# Patient Record
Sex: FEMALE | Race: WHITE | NOT HISPANIC OR LATINO | Employment: UNEMPLOYED | URBAN - METROPOLITAN AREA
[De-identification: names, ages, dates, MRNs, and addresses within clinical notes are randomized per-mention and may not be internally consistent; named-entity substitution may affect disease eponyms.]

---

## 2014-07-09 LAB — EXTERNAL HIV SCREEN: NORMAL

## 2017-01-09 ENCOUNTER — APPOINTMENT (OUTPATIENT)
Dept: LAB | Facility: CLINIC | Age: 32
End: 2017-01-09
Payer: COMMERCIAL

## 2017-01-09 ENCOUNTER — TRANSCRIBE ORDERS (OUTPATIENT)
Dept: URGENT CARE | Facility: CLINIC | Age: 32
End: 2017-01-09

## 2017-01-09 ENCOUNTER — OFFICE VISIT (OUTPATIENT)
Dept: URGENT CARE | Facility: CLINIC | Age: 32
End: 2017-01-09
Payer: COMMERCIAL

## 2017-01-09 DIAGNOSIS — R39.9 UNSPECIFIED SYMPTOMS AND SIGNS INVOLVING THE GENITOURINARY SYSTEM: ICD-10-CM

## 2017-01-09 PROCEDURE — 87086 URINE CULTURE/COLONY COUNT: CPT

## 2017-01-09 PROCEDURE — 99283 EMERGENCY DEPT VISIT LOW MDM: CPT

## 2017-01-09 PROCEDURE — G0382 LEV 3 HOSP TYPE B ED VISIT: HCPCS

## 2017-01-10 LAB — BACTERIA UR CULT: NORMAL

## 2017-02-22 ENCOUNTER — OFFICE VISIT (OUTPATIENT)
Dept: URGENT CARE | Facility: CLINIC | Age: 32
End: 2017-02-22
Payer: COMMERCIAL

## 2017-02-22 PROCEDURE — 99284 EMERGENCY DEPT VISIT MOD MDM: CPT

## 2017-02-22 PROCEDURE — 81002 URINALYSIS NONAUTO W/O SCOPE: CPT

## 2017-02-22 PROCEDURE — 81025 URINE PREGNANCY TEST: CPT

## 2017-02-22 PROCEDURE — G0383 LEV 4 HOSP TYPE B ED VISIT: HCPCS

## 2017-04-03 ENCOUNTER — OFFICE VISIT (OUTPATIENT)
Dept: URGENT CARE | Facility: CLINIC | Age: 32
End: 2017-04-03
Payer: COMMERCIAL

## 2017-04-03 PROCEDURE — 99283 EMERGENCY DEPT VISIT LOW MDM: CPT

## 2017-04-03 PROCEDURE — G0382 LEV 3 HOSP TYPE B ED VISIT: HCPCS

## 2017-04-04 ENCOUNTER — OFFICE VISIT (OUTPATIENT)
Dept: URGENT CARE | Facility: CLINIC | Age: 32
End: 2017-04-04
Payer: COMMERCIAL

## 2017-04-04 PROCEDURE — 99284 EMERGENCY DEPT VISIT MOD MDM: CPT

## 2017-04-04 PROCEDURE — G0383 LEV 4 HOSP TYPE B ED VISIT: HCPCS

## 2017-08-01 ENCOUNTER — OFFICE VISIT (OUTPATIENT)
Dept: URGENT CARE | Facility: CLINIC | Age: 32
End: 2017-08-01
Payer: COMMERCIAL

## 2017-08-01 ENCOUNTER — APPOINTMENT (OUTPATIENT)
Dept: LAB | Facility: CLINIC | Age: 32
End: 2017-08-01
Payer: COMMERCIAL

## 2017-08-01 ENCOUNTER — TRANSCRIBE ORDERS (OUTPATIENT)
Dept: URGENT CARE | Facility: CLINIC | Age: 32
End: 2017-08-01

## 2017-08-01 DIAGNOSIS — R35.0 FREQUENCY OF MICTURITION: ICD-10-CM

## 2017-08-01 PROCEDURE — 87086 URINE CULTURE/COLONY COUNT: CPT

## 2017-08-01 PROCEDURE — 99283 EMERGENCY DEPT VISIT LOW MDM: CPT

## 2017-08-01 PROCEDURE — G0382 LEV 3 HOSP TYPE B ED VISIT: HCPCS

## 2017-08-02 LAB — BACTERIA UR CULT: NORMAL

## 2017-12-21 ENCOUNTER — OFFICE VISIT (OUTPATIENT)
Dept: URGENT CARE | Facility: CLINIC | Age: 32
End: 2017-12-21
Payer: COMMERCIAL

## 2017-12-21 PROCEDURE — G0382 LEV 3 HOSP TYPE B ED VISIT: HCPCS

## 2017-12-21 PROCEDURE — 99283 EMERGENCY DEPT VISIT LOW MDM: CPT

## 2017-12-23 NOTE — PROGRESS NOTES
Assessment   1  Insect bite, initial encounter (500 4,L562 4) [facility])    Discussion/Summary   Discussion Summary:    Discussed diagnosis of insect bites right upper arm may use over-the-counter Benadryl as needed for itching and follow up with PCP in 3-5 days  Medication Side Effects Reviewed: Possible side effects of new medications were reviewed with the patient/guardian today  Understands and agrees with treatment plan: The treatment plan was reviewed with the patient/guardian  The patient/guardian understands and agrees with the treatment plan    Counseling Documentation With Imm: The patient was counseled regarding instructions for management,-- patient and family education,-- importance of compliance with treatment  total time of encounter was 25 minutes-- and-- 10 minutes was spent counseling  Follow Up Instructions: Follow Up with your Primary Care Provider in 3-5 days  If your symptoms worsen, go to the nearest [place] [address] Emergency Department  Chief Complaint   1  Rash  Chief Complaint Free Text Note Form: C/o rash on upper right arm today  History of Present Illness   HPI: 58-year-old female presents to urgent care with chief complaint of red raised rash to right upper arm she noticed it this morning there is 1 red raised area noted right arm    Hospital Based Practices Required Assessment:      Pain Assessment      the patient states they do not have pain  Abuse And Domestic Violence Screen       Yes, the patient is safe at home  -- The patient states no one is hurting them  Depression And Suicide Screen  No, the patient has not had thoughts of hurting themself  No, the patient has not felt depressed in the past 7 days  Readiness To Learn: Receptive  Barriers To Learning: none        Education Completed: disease/condition,-- medications-- and-- further treatment/follow-up      Teaching Method: verbal      Person Taught: patient      Evaluation Of Learning: verbalized/demonstrated understanding    Rash: Meryle Larsen presents with complaints of rash  Associated symptoms include skin bumps-- and-- pruritus, but-- no skin blistering,-- no cracking,-- no crusting,-- no draining,-- no skin dryness,-- no skin oiliness,-- no pain,-- no skin redness,-- no skin scaling,-- no skin swelling,-- no skin ulceration,-- no skin weeping,-- no excoriations,-- no fever,-- no fissuring,-- no nausea,-- no pustules,-- no purulent drainage-- and-- no serous discharge  Review of Systems   Focused-Female:      Constitutional: No fever, no chills, feels well, no tiredness, no recent weight gain or loss  ENT: no ear ache, no loss of hearing, no nosebleeds or nasal discharge, no sore throat or hoarseness  Cardiovascular: no complaints of slow or fast heart rate, no chest pain, no palpitations, no leg claudication or lower extremity edema  Respiratory: no complaints of shortness of breath, no wheezing, no dyspnea on exertion, no orthopnea or PND  Breasts: no complaints of breast pain, breast lump or nipple discharge  Gastrointestinal: no complaints of abdominal pain, no constipation, no nausea or diarrhea, no vomiting, no bloody stools  Genitourinary: no complaints of dysuria, no incontinence, no pelvic pain, no dysmenorrhea, no vaginal discharge or abnormal vaginal bleeding  Musculoskeletal: no complaints of arthralgia, no myalgia, no joint swelling or stiffness, no limb pain or swelling  Integumentary: as noted in HPI  Neurological: no complaints of headache, no confusion, no numbness or tingling, no dizziness or fainting  ROS Reviewed:    ROS reviewed  Active Problems   1  Abdominal pain, acute (789 00,338 19) (R10 9)   2  Urinary frequency (788 41) (R35 0)    Past Medical History   1  History of Acute frontal sinusitis (461 1) (J01 10)   2  History of Acute URI (465 9) (J06 9)   3   History of Bug bites (919 4,E906 4) (W57 XXXA)   4  History of Cellulitis (682 9) (L03 90)   5  History of Dysuria (788 1) (R30 0)   6  History of abdominal pain (V13 89) (Z87 898)   7  History of acute bronchitis (V12 69) (Z87 09)   8  History of acute sinusitis (V12 69) (Z87 09)   9  History of diarrhea (V12 79) (Z87 898)   10  History of pregnancy (V13 29)   11  History of spontaneous  (V13 29) (Z87 59)   12  History of urinary tract infection (V13 02) (Z87 440)   13  History of urinary tract infection (V13 02) (Z87 440)   14  History of urinary tract infection (V13 02) (Z87 440)   15  History of urinary tract infection (V13 02) (Z87 440)   16  Nausea with vomiting (787 01) (R11 2)   17  History of Symptoms involving urinary system (788 99) (R39 9)  Active Problems And Past Medical History Reviewed: The active problems and past medical history were reviewed and updated today  Family History   Father    1  Family history of hypertension (V17 49) (Z82 49)  Grandmother    2  Family history of lung cancer (V16 1) (Z80 1)  Grandfather    3  Family history of cardiac disorder (V17 49) (Z82 49)  Family History    4  Family history of lung cancer (V16 1) (Z80 1)  Family History Reviewed: The family history was reviewed and updated today  Social History    · Current every day smoker (305 1) (F17 200)  Social History Reviewed: The social history was reviewed and updated today  The social history was reviewed and is unchanged  Surgical History   1  History of  Section   2  History of Dilation And Curettage   3  History of Tubal Ligation  Surgical History Reviewed: The surgical history was reviewed and updated today  Current Meds    1  Multi-Day Oral Tablet; Therapy: (Recorded:2016) to Recorded   2  Xanax TABS; Therapy: (Recorded:2016) to Recorded  Medication List Reviewed: The medication list was reviewed and updated today  Allergies   1   Zithromax TABS    Vitals   Signs   Recorded: [id number] 12:56PM   Temperature: 97 2 F  Heart Rate: 71  Respiration: 20  Systolic: 702  Diastolic: 75  Height: 5 ft 3 in  Weight: 130 lb   BMI Calculated: 23 03  BSA Calculated: 1 61  O2 Saturation: 100    Physical Exam        Constitutional      General appearance: No acute distress, well appearing and well nourished  Eyes      Conjunctiva and lids: No swelling, erythema or discharge  Pupils and irises: Equal, round and reactive to light  Ears, Nose, Mouth, and Throat      External inspection of ears and nose: Normal        Otoscopic examination: Tympanic membranes translucent with normal light reflex  Canals patent without erythema  Nasal mucosa, septum, and turbinates: Normal without edema or erythema  Oropharynx: Normal with no erythema, edema, exudate or lesions  Pulmonary      Respiratory effort: No increased work of breathing or signs of respiratory distress  Auscultation of lungs: Clear to auscultation  Cardiovascular      Palpation of heart: Normal PMI, no thrills  Auscultation of heart: Normal rate and rhythm, normal S1 and S2, without murmurs  Examination of extremities for edema and/or varicosities: Normal        Abdomen      Abdomen: Non-tender, no masses  Liver and spleen: No hepatomegaly or splenomegaly  Lymphatic      Palpation of lymph nodes in neck: No lymphadenopathy  Musculoskeletal      Gait and station: Normal        Digits and nails: Normal without clubbing or cyanosis  Inspection/palpation of joints, bones, and muscles: Normal        Skin      Skin and subcutaneous tissue: Abnormal  -- 1 red raised pustule noted to right upper arm no redness warmth surrounding site of some petechiae noted surrounding area        Psychiatric      Orientation to person, place, and time: Normal        Mood and affect: Normal        Signatures    Electronically signed by : Hilda Stone NP; Dec 21 2017  1:06PM EST (Author)     Electronically signed by : KEITH Mancini ; Dec 22 2017  9:00AM EST                       (Co-author)

## 2018-01-23 VITALS
WEIGHT: 130 LBS | RESPIRATION RATE: 20 BRPM | TEMPERATURE: 97.2 F | HEIGHT: 63 IN | DIASTOLIC BLOOD PRESSURE: 75 MMHG | BODY MASS INDEX: 23.04 KG/M2 | HEART RATE: 71 BPM | OXYGEN SATURATION: 100 % | SYSTOLIC BLOOD PRESSURE: 117 MMHG

## 2018-01-30 ENCOUNTER — OFFICE VISIT (OUTPATIENT)
Dept: URGENT CARE | Facility: CLINIC | Age: 33
End: 2018-01-30
Payer: COMMERCIAL

## 2018-01-30 VITALS
SYSTOLIC BLOOD PRESSURE: 132 MMHG | TEMPERATURE: 98.1 F | HEART RATE: 94 BPM | BODY MASS INDEX: 21.68 KG/M2 | HEIGHT: 64 IN | OXYGEN SATURATION: 100 % | DIASTOLIC BLOOD PRESSURE: 82 MMHG | WEIGHT: 127 LBS

## 2018-01-30 DIAGNOSIS — J01.90 ACUTE NON-RECURRENT SINUSITIS, UNSPECIFIED LOCATION: Primary | ICD-10-CM

## 2018-01-30 PROCEDURE — 99283 EMERGENCY DEPT VISIT LOW MDM: CPT | Performed by: PHYSICIAN ASSISTANT

## 2018-01-30 PROCEDURE — G0382 LEV 3 HOSP TYPE B ED VISIT: HCPCS | Performed by: PHYSICIAN ASSISTANT

## 2018-01-30 RX ORDER — ALPRAZOLAM 0.25 MG/1
0.25 TABLET ORAL EVERY 24 HOURS
COMMUNITY
Start: 2017-11-16

## 2018-01-30 RX ORDER — ALPRAZOLAM 2 MG/1
TABLET ORAL
COMMUNITY
End: 2018-04-24 | Stop reason: ALTCHOICE

## 2018-01-30 RX ORDER — AMOXICILLIN 875 MG/1
875 TABLET, COATED ORAL 2 TIMES DAILY
Qty: 14 TABLET | Refills: 0 | Status: SHIPPED | OUTPATIENT
Start: 2018-01-30 | End: 2018-02-06

## 2018-01-30 NOTE — PATIENT INSTRUCTIONS
Rhinosinusitis   WHAT YOU NEED TO KNOW:   Rhinosinusitis (RS) is inflammation of your nose and sinuses  It commonly begins as a virus, often as a common cold  Viruses usually last 7 to 10 days and do not need treatment  When the virus does not get better on its own, you may have bacterial RS  This means that bacteria have begun to grow inside your sinuses  Acute RS lasts less than 4 weeks  Chronic RS lasts 12 weeks or more  Recurrent RS is when you have 4 or more episodes of RS in one year  DISCHARGE INSTRUCTIONS:   Return to the emergency department if:   · Your eye and eyelid are red, swollen, and painful  · You cannot open your eye  · You have double vision or you cannot see  · Your eyeball bulges out or you cannot move your eye  · You are more sleepy than normal or you notice changes in your ability to think, move, or talk  · You have a stiff neck, a fever, or a bad headache  · You have swelling of your forehead or scalp  Contact your healthcare provider if:   · Your symptoms are worse or do not improve after 3 to 5 days of treatment  · You have questions or concerns about your condition or care  Medicines: You may need any of the following:  · Acetaminophen  decreases pain and fever  It is available without a doctor's order  Ask how much to take and how often to take it  Follow directions  Acetaminophen can cause liver damage if not taken correctly  · NSAIDs , such as ibuprofen, help decrease swelling, pain, and fever  This medicine is available with or without a doctor's order  NSAIDs can cause stomach bleeding or kidney problems in certain people  If you take blood thinner medicine, always ask your healthcare provider if NSAIDs are safe for you  Always read the medicine label and follow directions  · Nasal steroid sprays  decrease inflammation in your nose and sinuses  · Decongestants  reduce swelling and drain mucus in the nose and sinuses   They may help you breathe easier  · Antihistamines  dry mucus in the nose and relieve sneezing  · Antibiotics  treat a bacterial infection and may be needed if your symptoms do not improve or they get worse  · Take your medicine as directed  Contact your healthcare provider if you think your medicine is not helping or if you have side effects  Tell him or her if you are allergic to any medicine  Keep a list of the medicines, vitamins, and herbs you take  Include the amounts, and when and why you take them  Bring the list or the pill bottles to follow-up visits  Carry your medicine list with you in case of an emergency  Self-care:   · Rinse your sinuses  Use a sinus rinse device to rinse your nasal passages with a saline (salt water) solution  This will help thin the mucus in your nose and rinse away pollen and dirt  It will also help reduce swelling so you can breathe normally  Ask your healthcare provider how often to do this  · Breathe in steam   Heat a bowl of water until you see steam  Lean over the bowl and make a tent over your head with a large towel  Breathe deeply for about 20 minutes  Be careful not to get too close to the steam or burn yourself  Do this 3 times a day  You can also breathe deeply when you take a hot shower  · Sleep with your head elevated  Place an extra pillow under your head before you go to sleep to help your sinuses drain  · Drink liquids as directed  Ask your healthcare provider how much liquid to drink each day and which liquids are best for you  Liquids will thin the mucus in your nose and help it drain  Avoid drinks that contain alcohol or caffeine  · Do not smoke, and avoid secondhand smoke  Nicotine and other chemicals in cigarettes and cigars can make your symptoms worse  Ask your healthcare provider for information if you currently smoke and need help to quit  E-cigarettes or smokeless tobacco still contain nicotine   Talk to your healthcare provider before you use these products  Follow up with your healthcare provider as directed: Follow up if your symptoms are worse or not better after 3 to 5 days of treatment  Write down your questions so you remember to ask them during your visits  © 2017 [address] [employer] Information is for End User's use only and may not be sold, redistributed or otherwise used for commercial purposes  All illustrations and images included in CareNotes® are the copyrighted property of A D [employer] M , Inc  or [address]  The above information is an  only  It is not intended as medical advice for individual conditions or treatments  Talk to your doctor, nurse or pharmacist before following any medical regimen to see if it is safe and effective for you

## 2018-01-30 NOTE — PROGRESS NOTES
Assessment/Plan:      Diagnoses and all orders for this visit:    Acute non-recurrent sinusitis, unspecified location    Other orders  -     ALPRAZolam (XANAX) 0 25 mg tablet; Take 0 25 mg by mouth every 24 hours  -     CHOLECALCIFEROL PO; Take 2,000 Units by mouth  -     Omega-3 Fatty Acids (FISH OIL PO); Take 2 g by mouth  -     Multiple Vitamin (MULTI-DAY PO); Take by mouth  -     ALPRAZolam (XANAX) 2 MG tablet; Take by mouth          Subjective:     Patient ID: Izabel Chowdhury is a 27 y o  female  Patient presents with 3 day history of bad taste in her mouth and bad breath  She states that she has a small cavity which is due to be filled on Monday and is not sure if the outer is coming from the cavity for a lot of purulent postnasal drip  She does have some mild on headaches nasal congestion no fever chills cough chest pain shortness of breath abdominal pain nausea vomiting or diarrhea  Review of Systems   Constitutional: Negative for chills and fever  HENT: Positive for postnasal drip, sinus pressure and sore throat  Negative for congestion, ear pain and trouble swallowing  Eyes: Negative for discharge  Respiratory: Positive for cough  Negative for chest tightness  Cardiovascular: Negative for chest pain  Gastrointestinal: Negative for abdominal pain  Musculoskeletal: Negative for arthralgias and myalgias  Neurological: Negative for dizziness and light-headedness  Hematological: Negative for adenopathy  Objective:     Physical Exam   Constitutional: She is oriented to person, place, and time  She appears well-developed and well-nourished  HENT:   Head: Normocephalic and atraumatic  Left Ear: External ear normal    Nose: Nose normal    Mouth/Throat: Oropharynx is clear and moist    Small cavity between the last 2 molars on the right lower side  Barely visible  Eyes: Conjunctivae are normal    Neck: Neck supple     Cardiovascular: Normal rate, regular rhythm and normal heart sounds  Pulmonary/Chest: Effort normal and breath sounds normal    Musculoskeletal: She exhibits no edema  Lymphadenopathy:     She has no cervical adenopathy  Neurological: She is alert and oriented to person, place, and time  Skin: Skin is warm and dry  No rash noted  Psychiatric: She has a normal mood and affect  Vitals reviewed  Patient Instructions   Rhinosinusitis   WHAT YOU NEED TO KNOW:   Rhinosinusitis (RS) is inflammation of your nose and sinuses  It commonly begins as a virus, often as a common cold  Viruses usually last 7 to 10 days and do not need treatment  When the virus does not get better on its own, you may have bacterial RS  This means that bacteria have begun to grow inside your sinuses  Acute RS lasts less than 4 weeks  Chronic RS lasts 12 weeks or more  Recurrent RS is when you have 4 or more episodes of RS in one year  DISCHARGE INSTRUCTIONS:   Return to the emergency department if:   · Your eye and eyelid are red, swollen, and painful  · You cannot open your eye  · You have double vision or you cannot see  · Your eyeball bulges out or you cannot move your eye  · You are more sleepy than normal or you notice changes in your ability to think, move, or talk  · You have a stiff neck, a fever, or a bad headache  · You have swelling of your forehead or scalp  Contact your healthcare provider if:   · Your symptoms are worse or do not improve after 3 to 5 days of treatment  · You have questions or concerns about your condition or care  Medicines: You may need any of the following:  · Acetaminophen  decreases pain and fever  It is available without a doctor's order  Ask how much to take and how often to take it  Follow directions  Acetaminophen can cause liver damage if not taken correctly  · NSAIDs , such as ibuprofen, help decrease swelling, pain, and fever  This medicine is available with or without a doctor's order   NSAIDs can cause stomach bleeding or kidney problems in certain people  If you take blood thinner medicine, always ask your healthcare provider if NSAIDs are safe for you  Always read the medicine label and follow directions  · Nasal steroid sprays  decrease inflammation in your nose and sinuses  · Decongestants  reduce swelling and drain mucus in the nose and sinuses  They may help you breathe easier  · Antihistamines  dry mucus in the nose and relieve sneezing  · Antibiotics  treat a bacterial infection and may be needed if your symptoms do not improve or they get worse  · Take your medicine as directed  Contact your healthcare provider if you think your medicine is not helping or if you have side effects  Tell him or her if you are allergic to any medicine  Keep a list of the medicines, vitamins, and herbs you take  Include the amounts, and when and why you take them  Bring the list or the pill bottles to follow-up visits  Carry your medicine list with you in case of an emergency  Self-care:   · Rinse your sinuses  Use a sinus rinse device to rinse your nasal passages with a saline (salt water) solution  This will help thin the mucus in your nose and rinse away pollen and dirt  It will also help reduce swelling so you can breathe normally  Ask your healthcare provider how often to do this  · Breathe in steam   Heat a bowl of water until you see steam  Lean over the bowl and make a tent over your head with a large towel  Breathe deeply for about 20 minutes  Be careful not to get too close to the steam or burn yourself  Do this 3 times a day  You can also breathe deeply when you take a hot shower  · Sleep with your head elevated  Place an extra pillow under your head before you go to sleep to help your sinuses drain  · Drink liquids as directed  Ask your healthcare provider how much liquid to drink each day and which liquids are best for you   Liquids will thin the mucus in your nose and help it drain  Avoid drinks that contain alcohol or caffeine  · Do not smoke, and avoid secondhand smoke  Nicotine and other chemicals in cigarettes and cigars can make your symptoms worse  Ask your healthcare provider for information if you currently smoke and need help to quit  E-cigarettes or smokeless tobacco still contain nicotine  Talk to your healthcare provider before you use these products  Follow up with your healthcare provider as directed: Follow up if your symptoms are worse or not better after 3 to 5 days of treatment  Write down your questions so you remember to ask them during your visits  © 2017 [facility] Information is for End User's use only and may not be sold, redistributed or otherwise used for commercial purposes  All illustrations and images included in CareNotes® are the copyrighted property of [employer] M , Inc  or [place] [place]  The above information is an  only  It is not intended as medical advice for individual conditions or treatments  Talk to your doctor, nurse or pharmacist before following any medical regimen to see if it is safe and effective for you

## 2018-04-13 ENCOUNTER — OFFICE VISIT (OUTPATIENT)
Dept: URGENT CARE | Facility: CLINIC | Age: 33
End: 2018-04-13
Payer: COMMERCIAL

## 2018-04-13 DIAGNOSIS — J02.9 ACUTE PHARYNGITIS, UNSPECIFIED ETIOLOGY: Primary | ICD-10-CM

## 2018-04-13 PROCEDURE — G0382 LEV 3 HOSP TYPE B ED VISIT: HCPCS | Performed by: PHYSICIAN ASSISTANT

## 2018-04-13 PROCEDURE — 99283 EMERGENCY DEPT VISIT LOW MDM: CPT | Performed by: PHYSICIAN ASSISTANT

## 2018-04-13 RX ORDER — AMOXICILLIN 875 MG/1
875 TABLET, COATED ORAL 2 TIMES DAILY
Qty: 20 TABLET | Refills: 0 | Status: SHIPPED | OUTPATIENT
Start: 2018-04-13 | End: 2018-04-23

## 2018-04-13 NOTE — PROGRESS NOTES
[facility]    NAME: Tiffany Kwong is a 27 y o  female  : 1985    MRN: [id number]  DATE: 2018  TIME: 9:10 AM    Assessment and Plan   Acute pharyngitis, unspecified etiology [J02 9]  1  Acute pharyngitis, unspecified etiology  amoxicillin (AMOXIL) 875 mg tablet       Patient Instructions     Patient Instructions   I have prescribed an antibiotic for the infection  Please take the antibiotic as prescribed and finish the entire prescription  I recommend that the patient takes an over the counter probiotic or eats yogurt with live cultures in it [place]) to keep good bacteria in the gut and help prevent diarrhea  Wash hands frequently to prevent the spread of infection  Can use over the counter cough and cold medications to help with symptoms  Ibuprofen and/or tylenol as needed for pain or fever  If not improving over the next 7-10 days, follow up with PCP  Chief Complaint   No chief complaint on file  History of Present Illness   26-year-old female here with complaint of nasal congestion and rhinorrhea for the last 5 days  States that is getting progressively worse  Also has a very bad sore throat  Son recently diagnosed with strep throat  Denies any fever chills  No nausea, vomiting or diarrhea  Review of Systems   Review of Systems   Constitutional: Negative for activity change, appetite change, chills, diaphoresis, fatigue, fever and unexpected weight change  HENT: Positive for congestion, rhinorrhea and sore throat  Negative for dental problem, hearing loss, sinus pressure, sneezing, tinnitus, trouble swallowing and voice change  Eyes: Negative for photophobia, redness and visual disturbance  Respiratory: Negative for apnea, cough, chest tightness, shortness of breath, wheezing and stridor  Cardiovascular: Negative for chest pain, palpitations and leg swelling     Gastrointestinal: Negative for abdominal distention, abdominal pain, blood in stool, constipation, diarrhea, nausea and vomiting  Endocrine: Negative for cold intolerance, heat intolerance, polydipsia, polyphagia and polyuria  Genitourinary: Negative for difficulty urinating, dysuria, flank pain, frequency, hematuria and urgency  Musculoskeletal: Negative for arthralgias, back pain, gait problem, joint swelling, myalgias, neck pain and neck stiffness  Skin: Negative for pallor, rash and wound  Neurological: Negative for dizziness, tremors, seizures, speech difficulty, weakness and headaches  Hematological: Negative for adenopathy  Does not bruise/bleed easily  Psychiatric/Behavioral: Negative for agitation, confusion, dysphoric mood and sleep disturbance  The patient is not nervous/anxious  All other systems reviewed and are negative        Current Medications     Current Outpatient Prescriptions:     ALPRAZolam (XANAX) 0 25 mg tablet, Take 0 25 mg by mouth every 24 hours, Disp: , Rfl:     ALPRAZolam (XANAX) 2 MG tablet, Take by mouth, Disp: , Rfl:     amoxicillin (AMOXIL) 875 mg tablet, Take 1 tablet (875 mg total) by mouth 2 (two) times a day for 10 days, Disp: 20 tablet, Rfl: 0    CHOLECALCIFEROL PO, Take 2,000 Units by mouth, Disp: , Rfl:     Multiple Vitamin (MULTI-DAY PO), Take by mouth, Disp: , Rfl:     Omega-3 Fatty Acids (FISH OIL PO), Take 2 g by mouth, Disp: , Rfl:     Current Allergies     Allergies as of 2018 - Reviewed 2018   Allergen Reaction Noted    Azithromycin  2017    Tramadol Palpitations 2016          The following portions of the patient's history were reviewed and updated as appropriate: allergies, current medications, past family history, past medical history, past social history, past surgical history and problem list    Past Medical History:   Diagnosis Date    Anxiety      Past Surgical History:   Procedure Laterality Date     SECTION      DILATION AND CURETTAGE, DIAGNOSTIC / THERAPEUTIC       No family history on file   Medications have been verified  Objective   There were no vitals taken for this visit  Physical Exam   Physical Exam   Constitutional: She appears well-developed and well-nourished  No distress  HENT:   Head: Normocephalic  Right Ear: Tympanic membrane and external ear normal    Left Ear: Tympanic membrane and external ear normal    Nose: Mucosal edema present  Mouth/Throat: Posterior oropharyngeal erythema present  No oropharyngeal exudate  Neck: Normal range of motion  Neck supple  Cardiovascular: Normal rate, regular rhythm and normal heart sounds  No murmur heard  Pulmonary/Chest: Effort normal and breath sounds normal  No respiratory distress  She has no wheezes  She has no rales  Abdominal: Soft  Bowel sounds are normal  There is no tenderness  Musculoskeletal: Normal range of motion  Lymphadenopathy:     She has no cervical adenopathy  Skin: Skin is warm  No rash noted

## 2018-04-24 ENCOUNTER — OFFICE VISIT (OUTPATIENT)
Dept: URGENT CARE | Facility: CLINIC | Age: 33
End: 2018-04-24
Payer: COMMERCIAL

## 2018-04-24 VITALS
OXYGEN SATURATION: 100 % | TEMPERATURE: 97.6 F | HEART RATE: 91 BPM | SYSTOLIC BLOOD PRESSURE: 133 MMHG | DIASTOLIC BLOOD PRESSURE: 82 MMHG

## 2018-04-24 DIAGNOSIS — L25.9 CONTACT DERMATITIS, UNSPECIFIED CONTACT DERMATITIS TYPE, UNSPECIFIED TRIGGER: Primary | ICD-10-CM

## 2018-04-24 PROCEDURE — G0382 LEV 3 HOSP TYPE B ED VISIT: HCPCS | Performed by: PHYSICIAN ASSISTANT

## 2018-04-24 PROCEDURE — 99283 EMERGENCY DEPT VISIT LOW MDM: CPT | Performed by: PHYSICIAN ASSISTANT

## 2018-04-24 RX ORDER — TRIAMCINOLONE ACETONIDE 1 MG/G
CREAM TOPICAL 3 TIMES DAILY
Qty: 45 G | Refills: 1 | Status: SHIPPED | OUTPATIENT
Start: 2018-04-24 | End: 2019-03-08

## 2018-04-24 NOTE — PROGRESS NOTES
[facility]    NAME: Marianna Martinez is a 27 y o  female  : 1985    MRN: [id number]  DATE: 2018  TIME: 2:35 PM    Assessment and Plan   Contact dermatitis, unspecified contact dermatitis type, unspecified trigger [L25 9]  1  Contact dermatitis, unspecified contact dermatitis type, unspecified trigger  triamcinolone (KENALOG) 0 1 % cream       Patient Instructions     Patient Instructions   Use cream as directed  Take over the counter benadryl or zyrtec as needed  Chief Complaint     Chief Complaint   Patient presents with    Rash     Started yesterday with redness, warmth, and itchy chest and neck       History of Present Illness   28-year-old female here with complaint of itchy rash on her chest   Rash started yesterday with redness, itchiness and increased warmth  No drainage or discharge from the area  Review of Systems   Review of Systems   Constitutional: Negative for activity change, appetite change, chills, diaphoresis, fatigue, fever and unexpected weight change  HENT: Negative for congestion, dental problem, hearing loss, sinus pressure, sneezing, sore throat, tinnitus, trouble swallowing and voice change  Eyes: Negative for photophobia, redness and visual disturbance  Respiratory: Negative for apnea, cough, chest tightness, shortness of breath, wheezing and stridor  Cardiovascular: Negative for chest pain, palpitations and leg swelling  Gastrointestinal: Negative for abdominal distention, abdominal pain, blood in stool, constipation, diarrhea, nausea and vomiting  Endocrine: Negative for cold intolerance, heat intolerance, polydipsia, polyphagia and polyuria  Genitourinary: Negative for difficulty urinating, dysuria, flank pain, frequency, hematuria and urgency  Musculoskeletal: Negative for arthralgias, back pain, gait problem, joint swelling, myalgias, neck pain and neck stiffness  Skin: Positive for rash  Negative for pallor and wound  Neurological: Negative for dizziness, tremors, seizures, speech difficulty, weakness and headaches  Hematological: Negative for adenopathy  Does not bruise/bleed easily  Psychiatric/Behavioral: Negative for agitation, confusion, dysphoric mood and sleep disturbance  The patient is not nervous/anxious  All other systems reviewed and are negative  Current Medications     Current Outpatient Prescriptions:     ALPRAZolam (XANAX) 0 25 mg tablet, Take 0 25 mg by mouth every 24 hours, Disp: , Rfl:     CHOLECALCIFEROL PO, Take 2,000 Units by mouth, Disp: , Rfl:     Multiple Vitamin (MULTI-DAY PO), Take by mouth, Disp: , Rfl:     Omega-3 Fatty Acids (FISH OIL PO), Take 2 g by mouth, Disp: , Rfl:     triamcinolone (KENALOG) 0 1 % cream, Apply topically 3 (three) times a day, Disp: 45 g, Rfl: 1    Current Allergies     Allergies as of 2018 - Reviewed 2018   Allergen Reaction Noted    Metronidazole GI Intolerance 2018    Azithromycin Palpitations 2017    Tramadol Palpitations 2016          The following portions of the patient's history were reviewed and updated as appropriate: allergies, current medications, past family history, past medical history, past social history, past surgical history and problem list    Past Medical History:   Diagnosis Date    Anxiety      Past Surgical History:   Procedure Laterality Date     SECTION      DILATION AND CURETTAGE, DIAGNOSTIC / THERAPEUTIC       No family history on file  Medications have been verified  Objective   /82   Pulse 91   Temp 97 6 °F (36 4 °C)   SpO2 100%      Physical Exam   Physical Exam   Constitutional: She appears well-developed and well-nourished  No distress  HENT:   Head: Normocephalic  Right Ear: External ear normal    Left Ear: External ear normal    Nose: Nose normal    Mouth/Throat: Oropharynx is clear and moist  No oropharyngeal exudate  Neck: Normal range of motion  Neck supple  Cardiovascular: Normal rate, regular rhythm and normal heart sounds  No murmur heard  Pulmonary/Chest: Effort normal and breath sounds normal  No respiratory distress  She has no wheezes  She has no rales  Abdominal: Soft  Bowel sounds are normal  There is no tenderness  Musculoskeletal: Normal range of motion  Lymphadenopathy:     She has no cervical adenopathy  Skin: Skin is warm  Rash (Erythematous macular papular rash on chest with induration ) noted

## 2019-03-08 ENCOUNTER — OFFICE VISIT (OUTPATIENT)
Dept: URGENT CARE | Facility: CLINIC | Age: 34
End: 2019-03-08
Payer: COMMERCIAL

## 2019-03-08 VITALS
SYSTOLIC BLOOD PRESSURE: 126 MMHG | HEIGHT: 64 IN | HEART RATE: 84 BPM | DIASTOLIC BLOOD PRESSURE: 80 MMHG | TEMPERATURE: 97.8 F | WEIGHT: 135 LBS | OXYGEN SATURATION: 99 % | BODY MASS INDEX: 23.05 KG/M2 | RESPIRATION RATE: 16 BRPM

## 2019-03-08 DIAGNOSIS — H10.9 BACTERIAL CONJUNCTIVITIS OF LEFT EYE: Primary | ICD-10-CM

## 2019-03-08 PROCEDURE — G0382 LEV 3 HOSP TYPE B ED VISIT: HCPCS | Performed by: PHYSICIAN ASSISTANT

## 2019-03-08 PROCEDURE — 99213 OFFICE O/P EST LOW 20 MIN: CPT | Performed by: PHYSICIAN ASSISTANT

## 2019-03-08 PROCEDURE — 99283 EMERGENCY DEPT VISIT LOW MDM: CPT | Performed by: PHYSICIAN ASSISTANT

## 2019-03-08 RX ORDER — OFLOXACIN 3 MG/ML
2 SOLUTION/ DROPS OPHTHALMIC 4 TIMES DAILY
Qty: 5 ML | Refills: 0 | Status: SHIPPED | OUTPATIENT
Start: 2019-03-08 | End: 2019-03-15

## 2019-03-08 NOTE — PROGRESS NOTES
[facility]        NAME: Neena Vizcaino is a 26 y o  female  : 1985    MRN: [id number]  DATE: 2019  TIME: 11:37 AM    Assessment and Plan   Bacterial conjunctivitis of left eye [H10 9]  1  Bacterial conjunctivitis of left eye  ofloxacin (OCUFLOX) 0 3 % ophthalmic solution         Patient Instructions       Follow up with PCP in 3-5 days  Proceed to  ER if symptoms worsen  Chief Complaint     Chief Complaint   Patient presents with    Conjunctivitis     C/o redness, burning and crusty drainage in left eye since last PM           History of Present Illness       27 y/o F presents for eval of left eye redness, draining, and crusting onset this a m  upon waking  Patient denies any associated symptoms, no runny nose, ear pain, fever, vision changes, neuro changes, dizziness  Patient denies sick contacts, however states she started a new job at a [address] [place] [address] [employer] last week  Review of Systems   Review of Systems   All other systems reviewed and are negative          Current Medications       Current Outpatient Medications:     ALPRAZolam (XANAX) 0 25 mg tablet, Take 0 25 mg by mouth every 24 hours, Disp: , Rfl:     CHOLECALCIFEROL PO, Take 2,000 Units by mouth, Disp: , Rfl:     Multiple Vitamin (MULTI-DAY PO), Take by mouth, Disp: , Rfl:     ofloxacin (OCUFLOX) 0 3 % ophthalmic solution, Administer 2 drops into the left eye 4 (four) times a day for 7 days, Disp: 5 mL, Rfl: 0    Omega-3 Fatty Acids (FISH OIL PO), Take 2 g by mouth, Disp: , Rfl:     sertraline (ZOLOFT) 50 mg tablet, , Disp: , Rfl: 3    Current Allergies     Allergies as of 2019 - Reviewed 2019   Allergen Reaction Noted    Metronidazole GI Intolerance 2018    Azithromycin Palpitations 2017    Tramadol Palpitations 2016            The following portions of the patient's history were reviewed and updated as appropriate: allergies, current medications, past family history, past medical history, past social history, past surgical history and problem list      Past Medical History:   Diagnosis Date    Anxiety        Past Surgical History:   Procedure Laterality Date     SECTION      DILATION AND CURETTAGE, DIAGNOSTIC / THERAPEUTIC         No family history on file  Medications have been verified  Objective   /80   Pulse 84   Temp 97 8 °F (36 6 °C) (Tympanic)   Resp 16   Ht 5' 4" (1 626 m)   Wt 61 2 kg (135 lb)   LMP 2019 (Exact Date)   SpO2 99%   BMI 23 17 kg/m²        Physical Exam     Physical Exam   Constitutional: She is oriented to person, place, and time  She appears well-developed and well-nourished  No distress  HENT:   Head: Normocephalic and atraumatic  Eyes: Pupils are equal, round, and reactive to light  EOM and lids are normal  Left eye exhibits discharge and exudate  Left conjunctiva is injected  Cardiovascular: Normal rate, regular rhythm and normal heart sounds  Exam reveals no gallop and no friction rub  No murmur heard  Pulmonary/Chest: Effort normal and breath sounds normal  She has no wheezes  She has no rales  Neurological: She is alert and oriented to person, place, and time  Psychiatric: She has a normal mood and affect  Vitals reviewed

## 2019-04-03 ENCOUNTER — OFFICE VISIT (OUTPATIENT)
Dept: URGENT CARE | Facility: CLINIC | Age: 34
End: 2019-04-03
Payer: COMMERCIAL

## 2019-04-03 VITALS
HEART RATE: 85 BPM | WEIGHT: 135 LBS | DIASTOLIC BLOOD PRESSURE: 67 MMHG | BODY MASS INDEX: 23.05 KG/M2 | OXYGEN SATURATION: 99 % | RESPIRATION RATE: 16 BRPM | SYSTOLIC BLOOD PRESSURE: 131 MMHG | HEIGHT: 64 IN | TEMPERATURE: 97.1 F

## 2019-04-03 DIAGNOSIS — R39.15 URINARY URGENCY: Primary | ICD-10-CM

## 2019-04-03 LAB
SL AMB  POCT GLUCOSE, UA: ABNORMAL
SL AMB LEUKOCYTE ESTERASE,UA: ABNORMAL
SL AMB POCT BILIRUBIN,UA: ABNORMAL
SL AMB POCT BLOOD,UA: ABNORMAL
SL AMB POCT CLARITY,UA: CLEAR
SL AMB POCT COLOR,UA: YELLOW
SL AMB POCT KETONES,UA: ABNORMAL
SL AMB POCT NITRITE,UA: ABNORMAL
SL AMB POCT PH,UA: 7
SL AMB POCT SPECIFIC GRAVITY,UA: 1.01
SL AMB POCT URINE PROTEIN: ABNORMAL
SL AMB POCT UROBILINOGEN: 0.2

## 2019-04-03 PROCEDURE — 99283 EMERGENCY DEPT VISIT LOW MDM: CPT | Performed by: NURSE PRACTITIONER

## 2019-04-03 PROCEDURE — 81002 URINALYSIS NONAUTO W/O SCOPE: CPT | Performed by: NURSE PRACTITIONER

## 2019-04-03 PROCEDURE — 99213 OFFICE O/P EST LOW 20 MIN: CPT | Performed by: NURSE PRACTITIONER

## 2019-04-03 PROCEDURE — 87086 URINE CULTURE/COLONY COUNT: CPT | Performed by: NURSE PRACTITIONER

## 2019-04-03 PROCEDURE — G0382 LEV 3 HOSP TYPE B ED VISIT: HCPCS | Performed by: NURSE PRACTITIONER

## 2019-04-05 LAB — BACTERIA UR CULT: NORMAL

## 2019-05-17 ENCOUNTER — OFFICE VISIT (OUTPATIENT)
Dept: URGENT CARE | Facility: CLINIC | Age: 34
End: 2019-05-17
Payer: COMMERCIAL

## 2019-05-17 VITALS
HEIGHT: 64 IN | TEMPERATURE: 97.6 F | RESPIRATION RATE: 18 BRPM | SYSTOLIC BLOOD PRESSURE: 118 MMHG | BODY MASS INDEX: 23.05 KG/M2 | HEART RATE: 94 BPM | WEIGHT: 135 LBS | DIASTOLIC BLOOD PRESSURE: 80 MMHG | OXYGEN SATURATION: 98 %

## 2019-05-17 DIAGNOSIS — L81.9: Primary | ICD-10-CM

## 2019-05-17 PROCEDURE — G0382 LEV 3 HOSP TYPE B ED VISIT: HCPCS | Performed by: PHYSICIAN ASSISTANT

## 2019-05-17 PROCEDURE — 99283 EMERGENCY DEPT VISIT LOW MDM: CPT | Performed by: PHYSICIAN ASSISTANT

## 2019-05-17 PROCEDURE — 99213 OFFICE O/P EST LOW 20 MIN: CPT | Performed by: PHYSICIAN ASSISTANT

## 2019-05-17 RX ORDER — ERGOCALCIFEROL 1.25 MG/1
CAPSULE ORAL
Refills: 0 | COMMUNITY
Start: 2019-04-08 | End: 2019-05-17

## 2019-05-24 ENCOUNTER — OFFICE VISIT (OUTPATIENT)
Dept: URGENT CARE | Facility: CLINIC | Age: 34
End: 2019-05-24
Payer: COMMERCIAL

## 2019-05-24 DIAGNOSIS — R23.8 PAPULE OF SKIN: Primary | ICD-10-CM

## 2019-05-24 PROCEDURE — 99213 OFFICE O/P EST LOW 20 MIN: CPT | Performed by: NURSE PRACTITIONER

## 2019-05-24 PROCEDURE — G0382 LEV 3 HOSP TYPE B ED VISIT: HCPCS | Performed by: NURSE PRACTITIONER

## 2020-10-07 ENCOUNTER — OFFICE VISIT (OUTPATIENT)
Dept: URGENT CARE | Facility: CLINIC | Age: 35
End: 2020-10-07
Payer: COMMERCIAL

## 2020-10-07 VITALS
HEART RATE: 110 BPM | OXYGEN SATURATION: 99 % | RESPIRATION RATE: 20 BRPM | WEIGHT: 148.4 LBS | HEIGHT: 64 IN | BODY MASS INDEX: 25.33 KG/M2 | TEMPERATURE: 99 F

## 2020-10-07 DIAGNOSIS — F41.9 ANXIETY: Primary | ICD-10-CM

## 2020-10-07 PROCEDURE — 99283 EMERGENCY DEPT VISIT LOW MDM: CPT | Performed by: PHYSICIAN ASSISTANT

## 2020-10-07 PROCEDURE — G0382 LEV 3 HOSP TYPE B ED VISIT: HCPCS | Performed by: PHYSICIAN ASSISTANT

## 2020-10-07 PROCEDURE — 99203 OFFICE O/P NEW LOW 30 MIN: CPT | Performed by: PHYSICIAN ASSISTANT

## 2020-10-21 ENCOUNTER — TELEPHONE (OUTPATIENT)
Dept: ADMINISTRATIVE | Facility: OTHER | Age: 35
End: 2020-10-21

## 2020-11-03 ENCOUNTER — OFFICE VISIT (OUTPATIENT)
Dept: URGENT CARE | Facility: CLINIC | Age: 35
End: 2020-11-03
Payer: COMMERCIAL

## 2020-11-03 VITALS
OXYGEN SATURATION: 99 % | HEIGHT: 64 IN | BODY MASS INDEX: 25.27 KG/M2 | RESPIRATION RATE: 18 BRPM | DIASTOLIC BLOOD PRESSURE: 83 MMHG | WEIGHT: 148 LBS | SYSTOLIC BLOOD PRESSURE: 128 MMHG | TEMPERATURE: 97 F | HEART RATE: 86 BPM

## 2020-11-03 DIAGNOSIS — M79.674 GREAT TOE PAIN, RIGHT: Primary | ICD-10-CM

## 2020-11-03 PROCEDURE — 99283 EMERGENCY DEPT VISIT LOW MDM: CPT | Performed by: PHYSICIAN ASSISTANT

## 2020-11-03 PROCEDURE — 99213 OFFICE O/P EST LOW 20 MIN: CPT | Performed by: PHYSICIAN ASSISTANT

## 2020-11-03 PROCEDURE — G0382 LEV 3 HOSP TYPE B ED VISIT: HCPCS | Performed by: PHYSICIAN ASSISTANT

## 2020-11-03 RX ORDER — CEPHALEXIN 500 MG/1
500 CAPSULE ORAL EVERY 8 HOURS SCHEDULED
Qty: 21 CAPSULE | Refills: 0 | Status: SHIPPED | OUTPATIENT
Start: 2020-11-03 | End: 2020-11-10

## 2020-11-30 ENCOUNTER — OFFICE VISIT (OUTPATIENT)
Dept: URGENT CARE | Facility: CLINIC | Age: 35
End: 2020-11-30
Payer: COMMERCIAL

## 2020-11-30 VITALS
BODY MASS INDEX: 24.59 KG/M2 | DIASTOLIC BLOOD PRESSURE: 84 MMHG | HEIGHT: 64 IN | SYSTOLIC BLOOD PRESSURE: 128 MMHG | HEART RATE: 86 BPM | RESPIRATION RATE: 20 BRPM | TEMPERATURE: 98.2 F | WEIGHT: 144 LBS | OXYGEN SATURATION: 100 %

## 2020-11-30 DIAGNOSIS — R30.0 DYSURIA: Primary | ICD-10-CM

## 2020-11-30 LAB
SL AMB  POCT GLUCOSE, UA: ABNORMAL
SL AMB LEUKOCYTE ESTERASE,UA: ABNORMAL
SL AMB POCT BILIRUBIN,UA: ABNORMAL
SL AMB POCT BLOOD,UA: ABNORMAL
SL AMB POCT CLARITY,UA: CLEAR
SL AMB POCT COLOR,UA: YELLOW
SL AMB POCT KETONES,UA: ABNORMAL
SL AMB POCT NITRITE,UA: ABNORMAL
SL AMB POCT PH,UA: 7.5
SL AMB POCT SPECIFIC GRAVITY,UA: 1
SL AMB POCT URINE PROTEIN: ABNORMAL
SL AMB POCT UROBILINOGEN: 0.2

## 2020-11-30 PROCEDURE — 99213 OFFICE O/P EST LOW 20 MIN: CPT | Performed by: PHYSICIAN ASSISTANT

## 2020-11-30 PROCEDURE — 87186 SC STD MICRODIL/AGAR DIL: CPT | Performed by: PHYSICIAN ASSISTANT

## 2020-11-30 PROCEDURE — 87077 CULTURE AEROBIC IDENTIFY: CPT | Performed by: PHYSICIAN ASSISTANT

## 2020-11-30 PROCEDURE — 99283 EMERGENCY DEPT VISIT LOW MDM: CPT | Performed by: PHYSICIAN ASSISTANT

## 2020-11-30 PROCEDURE — 87086 URINE CULTURE/COLONY COUNT: CPT | Performed by: PHYSICIAN ASSISTANT

## 2020-11-30 PROCEDURE — G0382 LEV 3 HOSP TYPE B ED VISIT: HCPCS | Performed by: PHYSICIAN ASSISTANT

## 2020-11-30 PROCEDURE — 81002 URINALYSIS NONAUTO W/O SCOPE: CPT | Performed by: PHYSICIAN ASSISTANT

## 2020-11-30 RX ORDER — ASCORBIC ACID 500 MG
500 TABLET ORAL DAILY
COMMUNITY

## 2020-11-30 RX ORDER — FERROUS SULFATE 325(65) MG
325 TABLET ORAL
COMMUNITY

## 2020-11-30 RX ORDER — SULFAMETHOXAZOLE AND TRIMETHOPRIM 800; 160 MG/1; MG/1
1 TABLET ORAL 2 TIMES DAILY
Qty: 14 TABLET | Refills: 0 | Status: SHIPPED | OUTPATIENT
Start: 2020-11-30 | End: 2020-12-07

## 2020-12-02 LAB
BACTERIA UR CULT: ABNORMAL
BACTERIA UR CULT: ABNORMAL

## 2021-06-17 ENCOUNTER — OFFICE VISIT (OUTPATIENT)
Dept: URGENT CARE | Facility: CLINIC | Age: 36
End: 2021-06-17
Payer: COMMERCIAL

## 2021-06-17 VITALS
SYSTOLIC BLOOD PRESSURE: 150 MMHG | TEMPERATURE: 97.8 F | OXYGEN SATURATION: 99 % | RESPIRATION RATE: 20 BRPM | WEIGHT: 144 LBS | BODY MASS INDEX: 24.72 KG/M2 | DIASTOLIC BLOOD PRESSURE: 90 MMHG | HEART RATE: 78 BPM

## 2021-06-17 DIAGNOSIS — T63.441A LOCAL REACTION TO BEE STING, ACCIDENTAL OR UNINTENTIONAL, INITIAL ENCOUNTER: Primary | ICD-10-CM

## 2021-06-17 PROCEDURE — 99213 OFFICE O/P EST LOW 20 MIN: CPT | Performed by: PHYSICIAN ASSISTANT

## 2021-06-17 RX ORDER — TRIAMCINOLONE ACETONIDE 1 MG/G
CREAM TOPICAL 3 TIMES DAILY
Qty: 80 G | Refills: 1 | Status: SHIPPED | OUTPATIENT
Start: 2021-06-17

## 2021-06-17 NOTE — PROGRESS NOTES
[facility]        NAME: Emily Laguerre is a 38 y o  female  : 1985    MRN: [id number]  DATE: 2021  TIME: 12:00 PM    Assessment and Plan   Local reaction to bee sting, accidental or unintentional, initial encounter [T63 441A]  1  Local reaction to bee sting, accidental or unintentional, initial encounter  triamcinolone (KENALOG) 0 1 % cream         Patient Instructions       Follow up with PCP in 3-5 days  Proceed to  ER if symptoms worsen      Chief Complaint     Chief Complaint   Patient presents with    Rash     from bee sting , x 2 days         History of Present Illness       HPI    Review of Systems   Review of Systems      Current Medications       Current Outpatient Medications:     ALPRAZolam (XANAX) 0 25 mg tablet, Take 0 25 mg by mouth every 24 hours, Disp: , Rfl:     ascorbic acid (VITAMIN C) 500 mg tablet, Take 500 mg by mouth daily, Disp: , Rfl:     CHOLECALCIFEROL PO, Take 2,000 Units by mouth, Disp: , Rfl:     ferrous sulfate 325 (65 Fe) mg tablet, Take 325 mg by mouth daily with breakfast, Disp: , Rfl:     Multiple Vitamin (MULTI-DAY PO), Take by mouth, Disp: , Rfl:     Omega-3 Fatty Acids (FISH OIL PO), Take 2 g by mouth, Disp: , Rfl:     sertraline (ZOLOFT) 50 mg tablet, , Disp: , Rfl: 3    triamcinolone (KENALOG) 0 1 % cream, Apply topically 3 (three) times a day, Disp: 80 g, Rfl: 1    Current Allergies     Allergies as of 2021 - Reviewed 2021   Allergen Reaction Noted    Metronidazole GI Intolerance 2018    Azithromycin Palpitations 2017    Tramadol Palpitations 2016            The following portions of the patient's history were reviewed and updated as appropriate: allergies, current medications, past family history, past medical history, past social history, past surgical history and problem list      Past Medical History:   Diagnosis Date    Anxiety     Depression        Past Surgical History:   Procedure Laterality Date     SECTION      DILATION AND CURETTAGE, DIAGNOSTIC / THERAPEUTIC         History reviewed  No pertinent family history  Medications have been verified  Objective   /90   Pulse 78   Temp 97 8 °F (36 6 °C)   Resp 20   Wt 65 3 kg (144 lb)   SpO2 99%   BMI 24 72 kg/m²   No LMP recorded         Physical Exam     Physical Exam

## 2021-06-17 NOTE — PROGRESS NOTES
[facility]        NAME: Warren Smith is a 38 y o  female  : 1985    MRN: [id number]  DATE: 2021  TIME: 12:04 PM    Assessment and Plan   Local reaction to bee sting, accidental or unintentional, initial encounter [T63 441A]  1  Local reaction to bee sting, accidental or unintentional, initial encounter  triamcinolone (KENALOG) 0 1 % cream         Patient Instructions       Follow up with PCP in 3-5 days  Proceed to  ER if symptoms worsen  Chief Complaint     Chief Complaint   Patient presents with    Rash     from bee sting , x 2 days         History of Present Illness         Patient was stung by a bee 2 days ago has redness and swelling on her right buttock  She is concerned has there is redness itching which is not improving  She does not want to take any steroids secondary to side effects  Did not try any antihistamine or topical creams  Review of Systems   Review of Systems   Constitutional: Negative for chills and fever  Respiratory: Negative for chest tightness  Skin: Positive for rash  Neurological: Positive for headaches           Current Medications       Current Outpatient Medications:     ALPRAZolam (XANAX) 0 25 mg tablet, Take 0 25 mg by mouth every 24 hours, Disp: , Rfl:     ascorbic acid (VITAMIN C) 500 mg tablet, Take 500 mg by mouth daily, Disp: , Rfl:     CHOLECALCIFEROL PO, Take 2,000 Units by mouth, Disp: , Rfl:     ferrous sulfate 325 (65 Fe) mg tablet, Take 325 mg by mouth daily with breakfast, Disp: , Rfl:     Multiple Vitamin (MULTI-DAY PO), Take by mouth, Disp: , Rfl:     Omega-3 Fatty Acids (FISH OIL PO), Take 2 g by mouth, Disp: , Rfl:     sertraline (ZOLOFT) 50 mg tablet, , Disp: , Rfl: 3    triamcinolone (KENALOG) 0 1 % cream, Apply topically 3 (three) times a day, Disp: 80 g, Rfl: 1    Current Allergies     Allergies as of 2021 - Reviewed 2021   Allergen Reaction Noted    Metronidazole GI Intolerance 2018    Azithromycin Palpitations 2017    Tramadol Palpitations 2016            The following portions of the patient's history were reviewed and updated as appropriate: allergies, current medications, past family history, past medical history, past social history, past surgical history and problem list      Past Medical History:   Diagnosis Date    Anxiety     Depression        Past Surgical History:   Procedure Laterality Date     SECTION      DILATION AND CURETTAGE, DIAGNOSTIC / THERAPEUTIC         History reviewed  No pertinent family history  Medications have been verified  Objective   /90   Pulse 78   Temp 97 8 °F (36 6 °C)   Resp 20   Wt 65 3 kg (144 lb)   SpO2 99%   BMI 24 72 kg/m²   No LMP recorded  Physical Exam     Physical Exam  Vitals and nursing note reviewed  Constitutional:       Appearance: Normal appearance  HENT:      Head: Normocephalic and atraumatic  Cardiovascular:      Rate and Rhythm: Normal rate and regular rhythm  Pulmonary:      Effort: Pulmonary effort is normal    Skin:     General: Skin is warm  Comments: 8 cm area of raised erythema right buttock consistent with a bee sting  Slight warmth to touch  No drainage  Neurological:      Mental Status: She is alert

## 2021-06-17 NOTE — PATIENT INSTRUCTIONS
Insect Bite or Sting   AMBULATORY CARE:   Most insect bites and stings  are not dangerous and go away without treatment  Common examples of insects that bite or sting are bees, ticks, mosquitoes, spiders, and ants  Insect bites or stings can lead to diseases such as malaria, West Nile virus, Lyme disease, or [place] Spotted Fever  Common signs and symptoms:   · Mild symptoms  include a red bump, pain, swelling, and itching  · Anaphylaxis symptoms  include throat tightness, trouble breathing, tingling, dizziness, and wheezing  Anaphylaxis is a sudden, life-threatening reaction that needs immediate treatment  Call 911 for signs or symptoms of anaphylaxis,  such as trouble breathing, swelling in your mouth or throat, or wheezing  You may also have itching, a rash, hives, or feel like you are going to faint  Seek care immediately if:   · You are stung on your tongue or in your throat  · A white area forms around the bite  · You are sweating badly or have body pain  · You think you were bitten or stung by a poisonous insect  Contact your healthcare provider if:   · You have a fever  · The area becomes red, warm, tender, and swollen beyond the area of the bite or sting  · You have questions or concerns about your condition or care  Steps to take for signs or symptoms of anaphylaxis:   · Immediately  give 1 shot of epinephrine only into the outer thigh muscle  · Leave the shot in place  as directed  Your healthcare provider may recommend you leave it in place for up to 10 seconds before you remove it  This helps make sure all of the epinephrine is delivered  · Call 911 and go to the emergency department,  even if the shot improved symptoms  Do not drive yourself  Bring the used epinephrine shot with you  Treatment  depends on how severe your symptoms are and if you had anaphylaxis before   You may need any of the following:  · Antihistamines  decrease mild symptoms such as itching and rash  · Epinephrine  is medicine used to treat severe allergic reactions such as anaphylaxis  If an insect bites or stings you:   · Remove the stinger  Scrape the stinger out with your fingernail, edge of a credit card, or a knife blade  Do not squeeze the wound  Gently wash the area with soap and water  · Remove the tick  Ticks must be removed as soon as possible so you do not get diseases passed through tick bites  Ask your healthcare provider for more information on tick bites and how to remove ticks  Care for your bite or sting wound:   · Elevate the affected area  Prop the wound above the level of your heart, if possible  Elevate the area for 10 to 20 minutes each hour or as directed by your healthcare provider  · Apply compresses  Soak a clean washcloth in cold water, wring it out, and put it on the bite or sting  Use the compress for 10 to 20 minutes each hour or as directed by your healthcare provider  After 24 to 48 hours, change to warm compresses  · Apply a baking soda paste  Add water to baking soda to make a thick paste  Put the paste on the area for 5 minutes  Rinse gently to remove the paste  Safety precautions to take if you are at risk for anaphylaxis:   · Keep 2 shots of epinephrine with you at all times  You may need a second shot, because epinephrine only works for about 20 minutes and symptoms may return  Your healthcare provider can show you and family members how to give the shot  Check the expiration date every month and replace it before it expires  · Create an action plan  Your healthcare provider can help you create a written plan that explains the allergy and an emergency plan to treat a reaction  The plan explains when to give a second epinephrine shot if symptoms return or do not improve after the first  Give copies of the action plan and emergency instructions to family members, work and school staff, and  providers   Show them how to give a shot of epinephrine  · Carry medical alert identification  Wear medical alert jewelry or carry a card that says you have an insect allergy  Ask your healthcare provider where to get these items  Prevent an insect bite or sting:   · Do not wear bright-colored or flower-print clothing when you plan to spend time outdoors  Do not use hairspray, perfumes, or aftershave  · Do not leave food out  · Empty any standing water  Wash containers with soap and water every 2 days  · Put screens on all open windows and doors  · Put insect repellent that contains DEET on skin that is showing when you go outside  Put insect repellent at the top of your boots, bottom of pant legs, and sleeve cuffs  Wear long sleeves, pants, and shoes  · Use citronella candles outdoors to help keep mosquitoes away  Put a tick and flea collar on pets  Follow up with your healthcare provider as directed:  Write down your questions so you remember to ask them during your visits  © Copyright [address] Information is for End User's use only and may not be sold, redistributed or otherwise used for commercial purposes  All illustrations and images included in CareNotes® are the copyrighted property of [employer]  or [facility] Blanca Castellon   The above information is an  only  It is not intended as medical advice for individual conditions or treatments  Talk to your doctor, nurse or pharmacist before following any medical regimen to see if it is safe and effective for you

## 2022-01-01 ENCOUNTER — OFFICE VISIT (OUTPATIENT)
Dept: URGENT CARE | Facility: CLINIC | Age: 37
End: 2022-01-01
Payer: COMMERCIAL

## 2022-01-01 VITALS
RESPIRATION RATE: 18 BRPM | OXYGEN SATURATION: 99 % | WEIGHT: 144 LBS | HEART RATE: 108 BPM | TEMPERATURE: 98.3 F | BODY MASS INDEX: 24.72 KG/M2

## 2022-01-01 DIAGNOSIS — Z11.59 SCREENING FOR VIRAL DISEASE: ICD-10-CM

## 2022-01-01 DIAGNOSIS — J06.9 ACUTE UPPER RESPIRATORY INFECTION: Primary | ICD-10-CM

## 2022-01-01 PROCEDURE — 99213 OFFICE O/P EST LOW 20 MIN: CPT | Performed by: NURSE PRACTITIONER

## 2022-01-01 PROCEDURE — 87636 SARSCOV2 & INF A&B AMP PRB: CPT | Performed by: NURSE PRACTITIONER

## 2022-01-01 RX ORDER — IRON POLYSACCHARIDE COMPLEX 150 MG
150 CAPSULE ORAL DAILY
COMMUNITY
Start: 2021-07-20 | End: 2022-07-20

## 2022-01-01 RX ORDER — ALPRAZOLAM 0.5 MG/1
TABLET ORAL
COMMUNITY
Start: 2021-11-19

## 2022-01-01 NOTE — PROGRESS NOTES
[facility]        NAME: Vanessa Moon is a 43 y o  female  : 1985    MRN: [id number]  DATE: 2022  TIME: 9:43 AM    Assessment and Plan   Acute upper respiratory infection [J06 9]  1  Acute upper respiratory infection     2  Screening for viral disease  COVID/FLU- Office Collect    CANCELED: [facility]         Patient Instructions       Follow up with PCP in 3-5 days  Proceed to  ER if symptoms worsen  You have a covid/flu test pending  You are to download  mychart for the results in 24-48 hours  You are to take OTC symptomatic relief  Follow up with your PCP  Go to the eD if symptoms worsen            Chief Complaint     Chief Complaint   Patient presents with    Cold Like Symptoms         History of Present Illness       This is a 43year old female who states started with a runny nose 2 days ago  She is not covid or flu vaccinated  She denies fevers, chills, n/v/d  She states she has bad anxiety doesn't know what to take and is scared it is covid  Review of Systems   Review of Systems   Constitutional: Negative  HENT: Positive for congestion and rhinorrhea  Eyes: Negative  Respiratory: Negative  Cardiovascular: Negative  Gastrointestinal: Negative  Endocrine: Negative  Genitourinary: Negative  Musculoskeletal: Negative  Skin: Negative  Allergic/Immunologic: Negative  Neurological: Negative  Hematological: Negative  Psychiatric/Behavioral: Negative            Current Medications       Current Outpatient Medications:     ALPRAZolam (XANAX) 0 5 mg tablet, , Disp: , Rfl:     ascorbic acid (VITAMIN C) 500 mg tablet, Take 500 mg by mouth daily, Disp: , Rfl:     CHOLECALCIFEROL PO, Take 2,000 Units by mouth, Disp: , Rfl:     ferrous sulfate 325 (65 Fe) mg tablet, Take 325 mg by mouth daily with breakfast, Disp: , Rfl:     Multiple Vitamin (MULTI-DAY PO), Take by mouth, Disp: , Rfl:     Omega-3 Fatty Acids (FISH OIL PO), Take 2 g by mouth, Disp: , Rfl:     sertraline (ZOLOFT) 50 mg tablet, , Disp: , Rfl: 3    ALPRAZolam (XANAX) 0 25 mg tablet, Take 0 25 mg by mouth every 24 hours (Patient not taking: Reported on 2022 ), Disp: , Rfl:     iron polysaccharides (FERREX) 150 mg capsule, Take 150 mg by mouth daily (Patient not taking: Reported on 2022 ), Disp: , Rfl:     triamcinolone (KENALOG) 0 1 % cream, Apply topically 3 (three) times a day (Patient not taking: Reported on 2022 ), Disp: 80 g, Rfl: 1    Current Allergies     Allergies as of 2022 - Reviewed 2022   Allergen Reaction Noted    Metronidazole GI Intolerance 2018    Azithromycin Palpitations 2017    Tramadol Palpitations 2016            The following portions of the patient's history were reviewed and updated as appropriate: allergies, current medications, past family history, past medical history, past social history, past surgical history and problem list      Past Medical History:   Diagnosis Date    Anxiety     Depression        Past Surgical History:   Procedure Laterality Date     SECTION      DILATION AND CURETTAGE, DIAGNOSTIC / THERAPEUTIC         History reviewed  No pertinent family history  Medications have been verified  Objective   Pulse (!) 108   Temp 98 3 °F (36 8 °C)   Resp 18   Wt 65 3 kg (144 lb)   SpO2 99%   BMI 24 72 kg/m²   No LMP recorded  Physical Exam     Physical Exam  Vitals and nursing note reviewed  Constitutional:       General: She is not in acute distress  Appearance: Normal appearance  She is normal weight  She is not ill-appearing, toxic-appearing or diaphoretic  HENT:      Head: Normocephalic and atraumatic  Right Ear: Tympanic membrane and ear canal normal       Left Ear: Tympanic membrane and ear canal normal       Nose: Congestion present        Mouth/Throat:      Mouth: Mucous membranes are moist       Pharynx: No posterior oropharyngeal erythema  Eyes:      Extraocular Movements: Extraocular movements intact  Pupils: Pupils are equal, round, and reactive to light  Cardiovascular:      Rate and Rhythm: Normal rate and regular rhythm  Pulses: Normal pulses  Heart sounds: Normal heart sounds  Pulmonary:      Effort: Pulmonary effort is normal       Breath sounds: Normal breath sounds  Abdominal:      Palpations: Abdomen is soft  Musculoskeletal:         General: Normal range of motion  Cervical back: Normal range of motion and neck supple  Skin:     General: Skin is warm and dry  Capillary Refill: Capillary refill takes less than 2 seconds  Neurological:      General: No focal deficit present  Mental Status: She is alert and oriented to person, place, and time  Psychiatric:         Mood and Affect: Mood normal          Behavior: Behavior normal          Thought Content:  Thought content normal          Judgment: Judgment normal

## 2022-01-01 NOTE — PATIENT INSTRUCTIONS
You have a covid/flu test pending  You are to download SL mychart for the results in 24-48 hours  You are to take OTC symptomatic relief  Follow up with your PCP  Go to the eD if symptoms worsen    Upper Respiratory Infection   WHAT YOU NEED TO KNOW:   An upper respiratory infection is also called a cold  It can affect your nose, throat, ears, and sinuses  Cold symptoms are usually worst for the first 3 to 5 days  Most people get better in 7 to 14 days  You may continue to cough for 2 to 3 weeks  Colds are caused by viruses and do not get better with antibiotics  DISCHARGE INSTRUCTIONS:   Call your local emergency number (911 in the [address] [place] [address],[address]) if:   · You have chest pain or trouble breathing  Return to the emergency department if:   · You have a fever over 102ºF (39ºC)  Call your doctor if:   · You have a low fever  · Your sore throat gets worse or you see white or yellow spots in your throat  · Your symptoms get worse after 3 to 5 days or are not better in 14 days  · You have a rash anywhere on your skin  · You have large, tender lumps in your neck  · You have thick, green, or yellow drainage from your nose  · You cough up thick yellow, green, or bloody mucus  · You have a bad earache  · You have questions or concerns about your condition or care  Medicines: You may need any of the following:  · Decongestants  help reduce nasal congestion and help you breathe more easily  If you take decongestant pills, they may make you feel restless or cause problems with your sleep  Do not use decongestant sprays for more than a few days  · Cough suppressants  help reduce coughing  Ask your healthcare provider which type of cough medicine is best for you  · NSAIDs , such as ibuprofen, help decrease swelling, pain, and fever  NSAIDs can cause stomach bleeding or kidney problems in certain people   If you take blood thinner medicine, always ask your healthcare provider if NSAIDs are safe for you  Always read the medicine label and follow directions  · Acetaminophen  decreases pain and fever  It is available without a doctor's order  Ask how much to take and how often to take it  Follow directions  Read the labels of all other medicines you are using to see if they also contain acetaminophen, or ask your doctor or pharmacist  Acetaminophen can cause liver damage if not taken correctly  Do not use more than 4 grams (4,000 milligrams) total of acetaminophen in one day  · Take your medicine as directed  Contact your healthcare provider if you think your medicine is not helping or if you have side effects  Tell him or her if you are allergic to any medicine  Keep a list of the medicines, vitamins, and herbs you take  Include the amounts, and when and why you take them  Bring the list or the pill bottles to follow-up visits  Carry your medicine list with you in case of an emergency  Self-care:   · Rest as much as possible  Slowly start to do more each day  · Drink more liquids as directed  Liquids will help thin and loosen mucus so you can cough it up  Liquids will also help prevent dehydration  Liquids that help prevent dehydration include water, fruit juice, and broth  Do not drink liquids that contain caffeine  Caffeine can increase your risk for dehydration  Ask your healthcare provider how much liquid to drink each day  · Soothe a sore throat  Gargle with warm salt water  Make salt water by dissolving ¼ teaspoon salt in 1 cup warm water  You may also suck on hard candy or throat lozenges  You may use a sore throat spray  · Use a humidifier or vaporizer  Use a cool mist humidifier or a vaporizer to increase air moisture in your home  This may make it easier for you to breathe and help decrease your cough  · Use saline nasal drops as directed  These help relieve congestion  · Apply petroleum-based jelly around the outside of your nostrils    This can decrease irritation from blowing your nose  · Do not smoke  Nicotine and other chemicals in cigarettes and cigars can make your symptoms worse  They can also cause infections such as bronchitis or pneumonia  Ask your healthcare provider for information if you currently smoke and need help to quit  E-cigarettes or smokeless tobacco still contain nicotine  Talk to your healthcare provider before you use these products  Prevent a cold:   · Wash your hands often  Use soap and water every time you wash your hands  Rub your soapy hands together, lacing your fingers  Use the fingers of one hand to scrub under the nails of the other hand  Wash for at least 20 seconds  Rinse with warm, running water for several seconds  Then dry your hands  Use germ-killing gel if soap and water are not available  Do not touch your eyes or mouth without washing your hands first          · Cover a sneeze or cough  Use a tissue that covers your mouth and nose  Put the used tissue in the trash right away  Use the bend of your arm if a tissue is not available  Wash your hands well with soap and water or use a hand   Do not stand close to anyone who is sneezing or coughing  · Try to stay away from others while you are sick  This is especially important during the first 2 to 3 days when the virus is more easily spread  Wait until a fever, cough, or other symptoms are gone before you return to work or other regular activities  · Do not share items while you are sick  This includes food, drinks, eating utensils, and dishes  Follow up with your doctor as directed:  Write down your questions so you remember to ask them during your visits  © [employer] 2021 Information is for End User's use only and may not be sold, redistributed or otherwise used for commercial purposes   All illustrations and images included in CareNotes® are the copyrighted property of [employer]  or Kwadwo Castellon   The above information is an  only  It is not intended as medical advice for individual conditions or treatments  Talk to your doctor, nurse or pharmacist before following any medical regimen to see if it is safe and effective for you  COVID-19 (Coronavirus Disease 2019)   WHAT YOU NEED TO KNOW:   Coronavirus disease 2019 (COVID-19) is the disease caused by a coronavirus first discovered in December 2019  Coronaviruses generally cause upper respiratory (nose, throat, and lung) infections, such as a cold  The new virus spreads quickly and easily  The virus can be spread starting 2 days before symptoms even begin  The virus has also changed into several new forms (called variants) since it was discovered  The variants may be more contagious (easily spread) than the original form  Some may also cause more severe illness than others  It is important to follow local, national, and worldwide measures to protect yourself and others from infection  DISCHARGE INSTRUCTIONS:   If you think you or someone you know may be infected:  Do the following to protect others:  · If emergency care is needed,  tell the  about the possible infection, or call ahead and tell the emergency department  · Call a healthcare provider  for instructions if symptoms are mild  Anyone who may be infected should not  arrive without calling first  The provider will need to protect staff members and other patients  · The person who may be infected needs to wear a face covering  while getting medical care  This will help lower the risk of infecting others  Coverings are not used for anyone who is younger than 2 years, has breathing problems, or cannot remove it  The provider can give you instructions for anyone who cannot wear a covering      Call your local emergency number (911 in the [address] [place] [address],[address]) or an emergency department if:   · You have trouble breathing or shortness of breath at rest     · You have chest pain or pressure that lasts longer than 5 minutes  · You become confused or hard to wake  · Your lips or face are blue  · You have a fever of 104°F (40°C) or higher  Call your doctor if:   · You do not  have symptoms of COVID-19 but had close physical contact within 14 days with someone who tested positive  · You have questions or concerns about your condition or care  Medicines: You may need any of the following for mild symptoms:  · Decongestants  help reduce nasal congestion and help you breathe more easily  If you take decongestant pills, they may make you feel restless or cause problems with your sleep  Do not use decongestant sprays for more than a few days  · Cough suppressants  help reduce coughing  Ask your healthcare provider which type of cough medicine is best for you  · Acetaminophen  decreases pain and fever  It is available without a doctor's order  Ask how much to take and how often to take it  Follow directions  Read the labels of all other medicines you are using to see if they also contain acetaminophen, or ask your doctor or pharmacist  Acetaminophen can cause liver damage if not taken correctly  Do not use more than 4 grams (4,000 milligrams) total of acetaminophen in one day  · NSAIDs , such as ibuprofen, help decrease swelling, pain, and fever  NSAIDs can cause stomach bleeding or kidney problems in certain people  If you take blood thinner medicine, always ask your healthcare provider if NSAIDs are safe for you  Always read the medicine label and follow directions  · Take your medicine as directed  Contact your healthcare provider if you think your medicine is not helping or if you have side effects  Tell him or her if you are allergic to any medicine  Keep a list of the medicines, vitamins, and herbs you take  Include the amounts, and when and why you take them  Bring the list or the pill bottles to follow-up visits  Carry your medicine list with you in case of an emergency      What you need to know about COVID-19 vaccines:  Get a vaccine even if you already had COVID-19  · COVID-19 vaccines are given as a shot in 1 or 2 doses  Some vaccines have emergency use authorization (EUA)  An EUA means the vaccine is not approved but is given because the benefits outweigh the risks  A 2-dose vaccine is fully approved for use in those 16 years or older  This vaccine also has an EUA for adolescents 12 to 15 years  Your healthcare provider can help you understand the benefits and risks  · A third dose is recommended for adults with a weakened immune system who get a 2-dose vaccine  The third dose is given at least 28 days after the second  · Even after you get the vaccine, continue social distancing and other measures  Experts are still learning how well the vaccines work to prevent infection, transmission, and severe illness  Although rare, you can become infected after you get the vaccine  You may also be able to pass the virus to others without knowing you are infected  · After you get the vaccine, check local, national, and international travel rules  Check to see if you need to be tested before you travel  You may also need to quarantine after you return  Some countries require proof of a negative test before you leave  You should also be tested 3 to 5 days after you return from another country  How the 2019 coronavirus spreads: The following are ways the virus is thought to spread, but more information may be coming:  · Droplets are the main way all coronaviruses spread  The virus travels in droplets that form when a person talks, coughs, or sneezes  The droplets can also float in the air for minutes or hours  Infection happens when you breathe in the droplets or get them in your eyes or nose  Close personal contact with an infected person increases your risk for infection  This means being within 6 feet (2 meters) of the person for at least 15 minutes over 24 hours      · Person-to-person contact can spread the virus  For example, a person with the virus on his or her hands can spread it by shaking hands with someone  · The virus can stay on objects and surfaces for a short time  You may become infected by touching the object or surface and then touching your eyes or mouth  · An infected animal may be able to infect a person who touches it  This may happen at live markets or on a farm  Help lower the risk for COVID-19:  The best way to prevent infection is to avoid anyone who is infected, but this can be hard to do  An infected person can spread the virus before signs or symptoms begin, or even if signs or symptoms never develop  The following can help lower the risk for infection:      · Wash your hands often throughout the day  Use soap and water  Rub your soapy hands together, lacing your fingers, for at least 20 seconds  Rinse with warm, running water  Dry your hands with a clean towel or paper towel  Use hand  that contains alcohol if soap and water are not available  Teach children how to wash their hands and use hand   · Cover sneezes and coughs  Turn your face away and cover your mouth and nose with a tissue  Throw the tissue away  Use the bend of your arm if a tissue is not available  Then wash your hands well with soap and water or use hand   Teach children how to cover a cough or sneeze  · Wear a face covering (mask) around anyone who does not live in your home  Use a cloth covering with at least 2 layers  You can also create layers by putting a cloth covering over a disposable non-medical mask  Cover your mouth and your nose  The covering should fit snugly against the bridge of your nose  Securely fasten it under your chin and on the sides of your face  Do not  wear a plastic face shield instead of a covering  Continue social distancing and washing your hands often  A face covering is not a substitute for social distancing safety measures  · Follow worldwide, national, and local social distancing guidelines  Keep at least 6 feet (2 meters) between you and others  Also keep this distance from anyone who comes to your home, such as someone making a delivery  Wear a face covering while you are around others  You will need to wear a covering in restaurants, stores, and other public buildings  You will also need a covering on mass transit, such as a bus, subway, or airplane  Remember to use a covering made from thick material or wear 2 coverings together  · Make a habit of not touching your face  If you get the virus on your hands, you can transfer it to your eyes, nose, or mouth and become infected  You can also transfer it to objects, surfaces, or people  Do not touch your eyes, nose, or mouth without washing your hands first     · Clean and disinfect high-touch surfaces and objects often  Use disinfecting wipes, or make a solution of 4 teaspoons of bleach in 1 quart (4 cups) of water  Clean and disinfect even if you think no one living in or coming to your home is infected with the virus  · Ask about other vaccines you may need  Get the influenza (flu) vaccine as soon as recommended each year, usually starting in September or October  Get the pneumonia vaccine if recommended  Your healthcare provider can tell you if you should also get other vaccines, and when to get them  Follow social distancing guidelines:  National and local social distancing rules vary  Rules may change over time as restrictions are lifted  Restrictions may return if an outbreak happens where you live  It is important to know and follow all current social distancing rules in your area  The following are general guidelines:  · Stay home if you are sick or think you may have COVID-19  It is important to stay home if you are waiting for a testing appointment or for test results  Even if you do not have symptoms, you can pass the virus to others      · Limit trips out of your home  Have food, medicines, and other supplies delivered and left at your door or other area, if possible  Plan trips out of your home so you make the fewest stops possible to limit close personal contact  Keep track of places you go  This will help contact tracers notify others if you become infected  · Avoid close physical contact with anyone who does not live in your home  Do not shake hands with, hug, or kiss a person as a greeting  If you must use public transportation (such as a bus or subway), try to sit or stand away from others  Only allow necessary people into your home  Wear your face covering, and remind others to wear a face covering  Remind them to wash their hands when they arrive and before they leave  Do not  let someone into your home or go to someone's home just to visit  Even if you both do not feel sick, the virus can pass from one of you to the other  · Avoid in-person gatherings and crowds  Gatherings or crowds of 10 or more individuals can cause the virus to spread  Avoid places such as briones, beaches, sporting events, and tourist attractions  For events such as parties, holiday meals, Sikhism services, and conferences, attend virtually (on a computer), if possible  · Ask your healthcare provider for other ways to have appointments  Some providers offer phone, video, or other types of appointments  You may also be able to get prescriptions for a few months of your medicines at a time  · Stay safe if you must go out to work  Keep physical distance between you and other workers as much as possible  Follow your employer's rules so everyone stays safe  If you have COVID-19 and are recovering at home,  healthcare providers will give you specific instructions to follow  The following are general guidelines to remind you how to keep others safe until you are well:  · Wash your hands often  Use soap and water as much as possible   Use hand  that contains alcohol if soap and water are not available  Dry your hands with a clean towel or paper towel  Do not share towels with anyone  If you use paper towels, throw them away in a lined trash can kept in your room or area  Use a covered trash can, if possible  · Do not go out of your home unless it is necessary  Ask someone who is not infected to go out for groceries or supplies, or have them delivered  Do not go to your healthcare provider's office without an appointment  · Only have close physical contact with a person giving direct care, or a baby or child you must care for  Family members and friends should not visit you  If possible, stay in a separate area or room of your home if you live with others  No one should go into the area or room except to give you care  You can visit with others by phone, video chat, e-mail, or similar systems  · Wear a face covering while others are near you  This can help prevent droplets from spreading the virus when you talk, sneeze, or cough  Put the covering on before anyone comes into your room or area  Remind the person to cover his or her nose and mouth before coming in to provide care for you  · Do not share items  Do not share dishes, towels, or other items with anyone  Items need to be washed after you use them  · Protect your baby  Some newborns have tested positive for the virus  It is not known if they became infected before or after birth  The highest risk is when a  has close contact with an infected person  If you are pregnant or breastfeeding, talk to your healthcare provider or obstetrician about any concerns you have  He or she will tell you when to bring your baby in for check-ups and vaccines  He or she will also tell you what to do if you think your baby was infected with the coronavirus  Wash your hands and put on a clean face covering before you breastfeed or care for your baby  · Do not handle live animals unless it is necessary  Some animals, including pets, have been infected with the new coronavirus  Ask someone who is not infected to take care of your pet until you are well  If you must care for a pet, wear a face covering  Wash your hands before and after you give care  Talk to your healthcare provider about how to keep a service animal safe, if needed  · Follow directions from your healthcare provider for being around others after you recover  It is not known if or for how long a recovered person can pass the virus to others  Your provider may give you instructions, such as continuing social distancing and wearing a face covering  He or she will tell you when it is okay to be around others again  This may be 10 to 20 days after symptoms started or you had a positive test  Most symptoms will also need to be gone  Your provider will give you more information  Follow up with your doctor as directed:  Write down your questions so you remember to ask them during your visits  For more information:   · Centers for Disease Control and Prevention  [address] , [address]  Phone: 3- 539 - [phone]  Web Address: [employer]    © [address] [place] [address] 2021 Information is for End User's use only and may not be sold, redistributed or otherwise used for commercial purposes  All illustrations and images included in CareNotes® are the copyrighted property of [employer]  or Formerly named [facility] Blanca Garner  The above information is an  only  It is not intended as medical advice for individual conditions or treatments  Talk to your doctor, nurse or pharmacist before following any medical regimen to see if it is safe and effective for you

## 2022-01-06 LAB
FLUAV RNA RESP QL NAA+PROBE: POSITIVE
FLUBV RNA RESP QL NAA+PROBE: NEGATIVE
SARS-COV-2 RNA RESP QL NAA+PROBE: NEGATIVE

## 2022-03-11 ENCOUNTER — OFFICE VISIT (OUTPATIENT)
Dept: URGENT CARE | Facility: CLINIC | Age: 37
End: 2022-03-11
Payer: COMMERCIAL

## 2022-03-11 VITALS
TEMPERATURE: 98 F | DIASTOLIC BLOOD PRESSURE: 82 MMHG | OXYGEN SATURATION: 100 % | HEART RATE: 86 BPM | SYSTOLIC BLOOD PRESSURE: 130 MMHG | RESPIRATION RATE: 20 BRPM

## 2022-03-11 DIAGNOSIS — R10.30 LOWER ABDOMINAL PAIN: Primary | ICD-10-CM

## 2022-03-11 LAB
SL AMB  POCT GLUCOSE, UA: NORMAL
SL AMB LEUKOCYTE ESTERASE,UA: NORMAL
SL AMB POCT BILIRUBIN,UA: NORMAL
SL AMB POCT BLOOD,UA: NORMAL
SL AMB POCT CLARITY,UA: CLEAR
SL AMB POCT COLOR,UA: YELLOW
SL AMB POCT KETONES,UA: NORMAL
SL AMB POCT NITRITE,UA: NORMAL
SL AMB POCT PH,UA: 7.5
SL AMB POCT SPECIFIC GRAVITY,UA: 1
SL AMB POCT URINE PROTEIN: NORMAL
SL AMB POCT UROBILINOGEN: 0.2

## 2022-03-11 PROCEDURE — 81002 URINALYSIS NONAUTO W/O SCOPE: CPT

## 2022-03-11 PROCEDURE — 99213 OFFICE O/P EST LOW 20 MIN: CPT

## 2022-03-11 NOTE — PATIENT INSTRUCTIONS
Increase fluid intake  Make sure your diet contains fiber  If increased fluids and fiber aren't relieving symptoms you can take colace and sennakot  Constipation   WHAT YOU NEED TO KNOW:   Constipation means you have hard, dry bowel movements, or you go longer than usual between bowel movements  DISCHARGE INSTRUCTIONS:   Call your doctor if:   · You have blood in your bowel movements  · You have a fever and abdominal pain with the constipation  · Your constipation gets worse  · You start to vomit  · You have questions or concerns about your condition or care  Medicines:   · Medicine  such as a laxative may help relax and loosen your intestines to help you have a bowel movement  Your provider may recommend you only use laxatives for a short time  Long-term use may make your bowels dependent on the medicine  · Take your medicine as directed  Contact your healthcare provider if you think your medicine is not helping or if you have side effects  Tell him of her if you are allergic to any medicine  Keep a list of the medicines, vitamins, and herbs you take  Include the amounts, and when and why you take them  Bring the list or the pill bottles to follow-up visits  Carry your medicine list with you in case of an emergency  Relieve constipation:   · A suppository  may be used to help soften your bowel movements  This may make them easier to pass  A suppository is guided into your rectum through your anus  · An enema  is liquid medicine used to clear bowel movement from your rectum  The medicine is put into your rectum through your anus  Prevent constipation:   · Drink liquids as directed  You may need to drink extra liquids to help soften and move your bowels  Ask how much liquid to drink each day and which liquids are best for you  · Eat high-fiber foods  This may help decrease constipation by adding bulk to your bowel movements   High-fiber foods include fruit, vegetables, whole-grain breads and cereals, and beans  Your healthcare provider or dietitian can help you create a high-fiber meal plan  Your provider may also recommend a fiber supplement if you cannot get enough fiber from food  · Exercise regularly  Regular physical activity can help stimulate your intestines  Walking is a good exercise to prevent or relieve constipation  Ask which exercises are best for you  · Schedule a time each day to have a bowel movement  This may help train your body to have regular bowel movements  Bend forward while you are on the toilet to help move the bowel movement out  Sit on the toilet for at least 10 minutes, even if you do not have a bowel movement  · Talk to your healthcare provider about your medicines  Certain medicines, such as opioids, can cause constipation  Your provider may be able to make medicine changes  For example, he or she may change the kind of medicine, or change when you take it  Follow up with your doctor as directed:  Write down your questions so you remember to ask them during your visits  © Copyright [employer] 2022 Information is for End User's use only and may not be sold, redistributed or otherwise used for commercial purposes  All illustrations and images included in CareNotes® are the copyrighted property of A D A M [employer]  or Kwadwo Castellon   The above information is an  only  It is not intended as medical advice for individual conditions or treatments  Talk to your doctor, nurse or pharmacist before following any medical regimen to see if it is safe and effective for you

## 2022-03-11 NOTE — PROGRESS NOTES
[facility]        NAME: Grzegorz Beltre is a 43 y o  female  : 1985    MRN: [id number]  DATE: 2022  TIME: 11:46 AM    Assessment and Plan   Lower abdominal pain [R10 30]  1  Lower abdominal pain  POCT urine dip       I discussed with the patient about dietary interventions to keep her on a more regualr bowel routine without the use of laxatives which are causing her too much abdominal cramping  Patient verbalized understanding  A urinalysis was done and negative  Patient Instructions     Increase fluid intake  Make sure your diet contains fiber  If increased fluids and fiber aren't relieving symptoms you can take colace and sennakot  Follow up with PCP in 3-5 days  Proceed to  ER if symptoms worsen  Chief Complaint     Chief Complaint   Patient presents with    Abdominal Pain     approx  x1 wk ago: started with worsening lower abd  discomfort/pain 4/10  No N/V/D  abd  bloating, urinary freq , concentrated urine  BM today  which was slightly darker in color & then turned back to brown  Normal amount noted  C/O increased anxiety & feels as if she is becoming bowel obsessed  History of Present Illness       Abdominal Pain  This is a new problem  The current episode started more than 1 month ago  The onset quality is gradual  The problem occurs intermittently  The problem has been unchanged  The pain is located in the generalized abdominal region  The pain is mild  The quality of the pain is aching and cramping  The abdominal pain does not radiate  Associated symptoms include belching, constipation and flatus  Pertinent negatives include no dysuria, fever, headaches, nausea or vomiting  Nothing aggravates the pain  She has tried antacids (miralax) for the symptoms  The treatment provided no relief  There is no history of abdominal surgery, colon cancer or GERD  Review of Systems   Review of Systems   Constitutional: Negative for chills and fever     Respiratory: Negative for chest tightness, shortness of breath and wheezing  Cardiovascular: Negative for chest pain and palpitations  Gastrointestinal: Positive for abdominal pain, constipation and flatus  Negative for nausea and vomiting  Genitourinary: Negative for dysuria, menstrual problem, pelvic pain, urgency and vaginal discharge  Neurological: Negative for headaches           Current Medications       Current Outpatient Medications:     ALPRAZolam (XANAX) 0 5 mg tablet, , Disp: , Rfl:     Omega-3 Fatty Acids (FISH OIL PO), Take 2 g by mouth, Disp: , Rfl:     ALPRAZolam (XANAX) 0 25 mg tablet, Take 0 25 mg by mouth every 24 hours (Patient not taking: Reported on 3/11/2022 ), Disp: , Rfl:     ascorbic acid (VITAMIN C) 500 mg tablet, Take 500 mg by mouth daily (Patient not taking: Reported on 3/11/2022 ), Disp: , Rfl:     CHOLECALCIFEROL PO, Take 2,000 Units by mouth (Patient not taking: Reported on 3/11/2022 ), Disp: , Rfl:     ferrous sulfate 325 (65 Fe) mg tablet, Take 325 mg by mouth daily with breakfast (Patient not taking: Reported on 3/11/2022 ), Disp: , Rfl:     iron polysaccharides (FERREX) 150 mg capsule, Take 150 mg by mouth daily (Patient not taking: Reported on 1/1/2022 ), Disp: , Rfl:     Multiple Vitamin (MULTI-DAY PO), Take by mouth (Patient not taking: Reported on 3/11/2022 ), Disp: , Rfl:     sertraline (ZOLOFT) 50 mg tablet, , Disp: , Rfl: 3    triamcinolone (KENALOG) 0 1 % cream, Apply topically 3 (three) times a day (Patient not taking: Reported on 1/1/2022 ), Disp: 80 g, Rfl: 1    Current Allergies     Allergies as of 03/11/2022 - Reviewed 03/11/2022   Allergen Reaction Noted    Metronidazole GI Intolerance 03/09/2018    Azithromycin Palpitations 04/03/2017    Tramadol Palpitations 04/04/2016            The following portions of the patient's history were reviewed and updated as appropriate: allergies, current medications, past family history, past medical history, past social history, past surgical history and problem list      Past Medical History:   Diagnosis Date    Anxiety     Depression        Past Surgical History:   Procedure Laterality Date     SECTION      DILATION AND CURETTAGE, DIAGNOSTIC / THERAPEUTIC         No family history on file  Medications have been verified  Objective   /82   Pulse 86   Temp 98 °F (36 7 °C)   Resp 20   SpO2 100%        Physical Exam     Physical Exam  Vitals and nursing note reviewed  Constitutional:       General: She is not in acute distress  Appearance: She is not ill-appearing or toxic-appearing  HENT:      Mouth/Throat:      Pharynx: Oropharynx is clear  Cardiovascular:      Rate and Rhythm: Normal rate and regular rhythm  Heart sounds: Normal heart sounds  Pulmonary:      Effort: Pulmonary effort is normal       Breath sounds: Normal breath sounds  Abdominal:      General: Abdomen is flat  Bowel sounds are normal       Palpations: Abdomen is soft  Tenderness: There is no abdominal tenderness  There is no right CVA tenderness or left CVA tenderness  Hernia: No hernia is present  Skin:     General: Skin is warm and dry  Capillary Refill: Capillary refill takes less than 2 seconds  Neurological:      General: No focal deficit present  Mental Status: She is alert and oriented to person, place, and time

## 2022-03-19 ENCOUNTER — OFFICE VISIT (OUTPATIENT)
Dept: URGENT CARE | Facility: CLINIC | Age: 37
End: 2022-03-19
Payer: COMMERCIAL

## 2022-03-19 ENCOUNTER — HOSPITAL ENCOUNTER (EMERGENCY)
Facility: HOSPITAL | Age: 37
Discharge: HOME | End: 2022-03-19
Attending: EMERGENCY MEDICINE
Payer: COMMERCIAL

## 2022-03-19 ENCOUNTER — APPOINTMENT (EMERGENCY)
Dept: CT IMAGING | Facility: HOSPITAL | Age: 37
End: 2022-03-19
Payer: COMMERCIAL

## 2022-03-19 VITALS
SYSTOLIC BLOOD PRESSURE: 157 MMHG | RESPIRATION RATE: 18 BRPM | HEART RATE: 97 BPM | DIASTOLIC BLOOD PRESSURE: 105 MMHG | BODY MASS INDEX: 24.59 KG/M2 | WEIGHT: 144 LBS | TEMPERATURE: 97.8 F | OXYGEN SATURATION: 96 % | HEIGHT: 64 IN

## 2022-03-19 VITALS
RESPIRATION RATE: 18 BRPM | OXYGEN SATURATION: 98 % | SYSTOLIC BLOOD PRESSURE: 142 MMHG | TEMPERATURE: 97.9 F | HEART RATE: 96 BPM | DIASTOLIC BLOOD PRESSURE: 80 MMHG

## 2022-03-19 DIAGNOSIS — R10.9 LEFT FLANK PAIN: ICD-10-CM

## 2022-03-19 DIAGNOSIS — R10.32 LEFT LOWER QUADRANT ABDOMINAL PAIN: Primary | ICD-10-CM

## 2022-03-19 DIAGNOSIS — N83.9 LESION OF LEFT OVARY: ICD-10-CM

## 2022-03-19 DIAGNOSIS — D21.9 FIBROID: Primary | ICD-10-CM

## 2022-03-19 LAB
ALBUMIN SERPL BCP-MCNC: 4.6 G/DL (ref 3.5–5)
ALP SERPL-CCNC: 58 U/L (ref 34–104)
ALT SERPL W P-5'-P-CCNC: 14 U/L (ref 7–52)
ANION GAP SERPL CALCULATED.3IONS-SCNC: 9 MMOL/L (ref 4–13)
AST SERPL W P-5'-P-CCNC: 12 U/L (ref 13–39)
B-HCG SERPL-ACNC: <1 MIU/ML (ref 0–11.6)
BASOPHILS # BLD AUTO: 0.06 THOUSANDS/ΜL (ref 0–0.1)
BASOPHILS NFR BLD AUTO: 1 % (ref 0–1)
BILIRUB SERPL-MCNC: 0.76 MG/DL (ref 0.2–1)
BILIRUB UR QL STRIP: NEGATIVE
BUN SERPL-MCNC: 7 MG/DL (ref 5–25)
CALCIUM SERPL-MCNC: 9.4 MG/DL (ref 8.4–10.2)
CHLORIDE SERPL-SCNC: 106 MMOL/L (ref 96–108)
CLARITY UR: CLEAR
CO2 SERPL-SCNC: 23 MMOL/L (ref 21–32)
COLOR UR: YELLOW
CREAT SERPL-MCNC: 0.66 MG/DL (ref 0.6–1.3)
EOSINOPHIL # BLD AUTO: 0.03 THOUSAND/ΜL (ref 0–0.61)
EOSINOPHIL NFR BLD AUTO: 0 % (ref 0–6)
ERYTHROCYTE [DISTWIDTH] IN BLOOD BY AUTOMATED COUNT: 15 % (ref 11.6–15.1)
GFR SERPL CREATININE-BSD FRML MDRD: 112 ML/MIN/1.73SQ M
GLUCOSE SERPL-MCNC: 93 MG/DL (ref 65–140)
GLUCOSE UR STRIP-MCNC: NEGATIVE MG/DL
HCT VFR BLD AUTO: 40.6 % (ref 34.8–46.1)
HGB BLD-MCNC: 12.7 G/DL (ref 11.5–15.4)
HGB UR QL STRIP.AUTO: NEGATIVE
IMM GRANULOCYTES # BLD AUTO: 0.04 THOUSAND/UL (ref 0–0.2)
IMM GRANULOCYTES NFR BLD AUTO: 0 % (ref 0–2)
KETONES UR STRIP-MCNC: NEGATIVE MG/DL
LEUKOCYTE ESTERASE UR QL STRIP: NEGATIVE
LIPASE SERPL-CCNC: 28 U/L (ref 11–82)
LYMPHOCYTES # BLD AUTO: 1.88 THOUSANDS/ΜL (ref 0.6–4.47)
LYMPHOCYTES NFR BLD AUTO: 20 % (ref 14–44)
MCH RBC QN AUTO: 24.5 PG (ref 26.8–34.3)
MCHC RBC AUTO-ENTMCNC: 31.3 G/DL (ref 31.4–37.4)
MCV RBC AUTO: 78 FL (ref 82–98)
MONOCYTES # BLD AUTO: 0.71 THOUSAND/ΜL (ref 0.17–1.22)
MONOCYTES NFR BLD AUTO: 8 % (ref 4–12)
NEUTROPHILS # BLD AUTO: 6.57 THOUSANDS/ΜL (ref 1.85–7.62)
NEUTS SEG NFR BLD AUTO: 71 % (ref 43–75)
NITRITE UR QL STRIP: NEGATIVE
NRBC BLD AUTO-RTO: 0 /100 WBCS
PH UR STRIP.AUTO: 7 [PH]
PLATELET # BLD AUTO: 323 THOUSANDS/UL (ref 149–390)
PMV BLD AUTO: 10.1 FL (ref 8.9–12.7)
POTASSIUM SERPL-SCNC: 4.2 MMOL/L (ref 3.5–5.3)
PROT SERPL-MCNC: 7.3 G/DL (ref 6.4–8.4)
PROT UR STRIP-MCNC: NEGATIVE MG/DL
RBC # BLD AUTO: 5.18 MILLION/UL (ref 3.81–5.12)
SL AMB  POCT GLUCOSE, UA: ABNORMAL
SL AMB LEUKOCYTE ESTERASE,UA: ABNORMAL
SL AMB POCT BILIRUBIN,UA: ABNORMAL
SL AMB POCT BLOOD,UA: ABNORMAL
SL AMB POCT CLARITY,UA: CLEAR
SL AMB POCT COLOR,UA: YELLOW
SL AMB POCT KETONES,UA: ABNORMAL
SL AMB POCT NITRITE,UA: ABNORMAL
SL AMB POCT PH,UA: 7
SL AMB POCT SPECIFIC GRAVITY,UA: 1.01
SL AMB POCT URINE PROTEIN: ABNORMAL
SL AMB POCT UROBILINOGEN: 0.2
SODIUM SERPL-SCNC: 138 MMOL/L (ref 135–147)
SP GR UR STRIP.AUTO: 1.01 (ref 1–1.03)
UROBILINOGEN UR QL STRIP.AUTO: 0.2 E.U./DL
WBC # BLD AUTO: 9.29 THOUSAND/UL (ref 4.31–10.16)

## 2022-03-19 PROCEDURE — 99213 OFFICE O/P EST LOW 20 MIN: CPT

## 2022-03-19 PROCEDURE — 85025 COMPLETE CBC W/AUTO DIFF WBC: CPT | Performed by: PHYSICIAN ASSISTANT

## 2022-03-19 PROCEDURE — 87086 URINE CULTURE/COLONY COUNT: CPT

## 2022-03-19 PROCEDURE — 83690 ASSAY OF LIPASE: CPT | Performed by: PHYSICIAN ASSISTANT

## 2022-03-19 PROCEDURE — 99284 EMERGENCY DEPT VISIT MOD MDM: CPT

## 2022-03-19 PROCEDURE — 36415 COLL VENOUS BLD VENIPUNCTURE: CPT | Performed by: PHYSICIAN ASSISTANT

## 2022-03-19 PROCEDURE — 81003 URINALYSIS AUTO W/O SCOPE: CPT | Performed by: PHYSICIAN ASSISTANT

## 2022-03-19 PROCEDURE — 81002 URINALYSIS NONAUTO W/O SCOPE: CPT

## 2022-03-19 PROCEDURE — 84702 CHORIONIC GONADOTROPIN TEST: CPT | Performed by: PHYSICIAN ASSISTANT

## 2022-03-19 PROCEDURE — 74177 CT ABD & PELVIS W/CONTRAST: CPT

## 2022-03-19 PROCEDURE — 99284 EMERGENCY DEPT VISIT MOD MDM: CPT | Performed by: PHYSICIAN ASSISTANT

## 2022-03-19 PROCEDURE — 80053 COMPREHEN METABOLIC PANEL: CPT | Performed by: PHYSICIAN ASSISTANT

## 2022-03-19 RX ORDER — KETOROLAC TROMETHAMINE 30 MG/ML
15 INJECTION, SOLUTION INTRAMUSCULAR; INTRAVENOUS ONCE
Status: DISCONTINUED | OUTPATIENT
Start: 2022-03-19 | End: 2022-03-19 | Stop reason: HOSPADM

## 2022-03-19 RX ADMIN — IOHEXOL 100 ML: 350 INJECTION, SOLUTION INTRAVENOUS at 12:16

## 2022-03-19 NOTE — PROGRESS NOTES
[facility]        NAME: Alejandrina Matson is a 43 y o  female  : 1985    MRN: [id number]  DATE: 2022  TIME: 8:37 AM      Assessment and Plan     Left lower quadrant abdominal pain [R10 32]  1  Left lower quadrant abdominal pain  POCT urine dip    Urine culture   2  Left flank pain  POCT urine dip    Urine culture       POCT urine dip shows trace ketones, rest WNL  urine culture sent  Given two negative urine dips (today and 3/11/22), patient referred to the ER for further evaluation  Patient Instructions   Proceed to the ER for further evaluation  [address] [place] [address], [address] [facility], [address]      Chief Complaint     Chief Complaint   Patient presents with    Possible UTI     approx  x5 days ago: started with lower abd/pelvic pain pressure  Left lower back/kidney area pain  urinary freq  noted  6/10 pain noted  History of Present Illness     Patient is a 23-year-old female who presents with l left lower abdominal/pelvic pain for five days  Reports pain in her back between her left flank and left lower back  States that the pain is intermittent and deep  States that the pain is a cramping sensation when it occurs  States the pain was worse last night  Reports urinary frequency in the morning  States she has been working out more than normal  States she did see her OB this past week and they thought it might be a cyst  Denies N/V/D  Denies fever or chills  Denies painful urination  Denies history of kidney stones  Reports history of UTI's  Denies DM hx  Due for her period in approximately 9 days  Review of Systems     Review of Systems   Constitutional: Negative for chills and fever  Gastrointestinal: Positive for abdominal pain (left lower quadrant)  Negative for diarrhea, nausea and vomiting  Genitourinary: Positive for flank pain (left), frequency and pelvic pain (pressure in left lower pelvic area)  Negative for dysuria and enuresis     All other systems reviewed and are negative  Current Medications       Current Outpatient Medications:     ALPRAZolam (XANAX) 0 5 mg tablet, , Disp: , Rfl:     Omega-3 Fatty Acids (FISH OIL PO), Take 2 g by mouth, Disp: , Rfl:     ALPRAZolam (XANAX) 0 25 mg tablet, Take 0 25 mg by mouth every 24 hours (Patient not taking: Reported on 3/11/2022 ), Disp: , Rfl:     ascorbic acid (VITAMIN C) 500 mg tablet, Take 500 mg by mouth daily (Patient not taking: Reported on 3/11/2022 ), Disp: , Rfl:     CHOLECALCIFEROL PO, Take 2,000 Units by mouth (Patient not taking: Reported on 3/11/2022 ), Disp: , Rfl:     ferrous sulfate 325 (65 Fe) mg tablet, Take 325 mg by mouth daily with breakfast (Patient not taking: Reported on 3/11/2022 ), Disp: , Rfl:     iron polysaccharides (FERREX) 150 mg capsule, Take 150 mg by mouth daily (Patient not taking: Reported on 2022 ), Disp: , Rfl:     Multiple Vitamin (MULTI-DAY PO), Take by mouth (Patient not taking: Reported on 3/11/2022 ), Disp: , Rfl:     sertraline (ZOLOFT) 50 mg tablet, , Disp: , Rfl: 3    triamcinolone (KENALOG) 0 1 % cream, Apply topically 3 (three) times a day (Patient not taking: Reported on 2022 ), Disp: 80 g, Rfl: 1    Current Allergies     Allergies as of 2022 - Reviewed 2022   Allergen Reaction Noted    Metronidazole GI Intolerance 2018    Azithromycin Palpitations 2017    Tramadol Palpitations 2016              The following portions of the patient's history were reviewed and updated as appropriate: allergies, current medications, past family history, past medical history, past social history, past surgical history and problem list      Past Medical History:   Diagnosis Date    Anxiety     Depression        Past Surgical History:   Procedure Laterality Date     SECTION      DILATION AND CURETTAGE, DIAGNOSTIC / THERAPEUTIC         History reviewed  No pertinent family history  Medications have been verified          Objective /80   Pulse (!) 110   Temp 97 9 °F (36 6 °C)   Resp 18   SpO2 98%   No LMP recorded  Physical Exam     Physical Exam  Vitals and nursing note reviewed  Constitutional:       General: She is awake  She is not in acute distress  Appearance: Normal appearance  She is not ill-appearing, toxic-appearing or diaphoretic  HENT:      Right Ear: Tympanic membrane, ear canal and external ear normal  Tympanic membrane is not injected or erythematous  Left Ear: Tympanic membrane, ear canal and external ear normal  Tympanic membrane is not injected or erythematous  Nose: Nose normal       Mouth/Throat:      Lips: Pink  Mouth: Mucous membranes are moist    Cardiovascular:      Rate and Rhythm: Tachycardia present  Pulses: Normal pulses  Heart sounds: Normal heart sounds  Pulmonary:      Effort: Pulmonary effort is normal  No respiratory distress  Breath sounds: Normal breath sounds and air entry  No stridor, decreased air movement or transmitted upper airway sounds  No decreased breath sounds, wheezing, rhonchi or rales  Abdominal:      General: Abdomen is flat  Bowel sounds are normal  There is no distension  Tenderness: There is abdominal tenderness (LLQ) in the left lower quadrant  There is no right CVA tenderness, left CVA tenderness, guarding or rebound  Comments: States the pain is deep   Skin:     General: Skin is warm  Capillary Refill: Capillary refill takes less than 2 seconds  Neurological:      Mental Status: She is alert  Psychiatric:         Mood and Affect: Mood normal          Behavior: Behavior normal          Thought Content:  Thought content normal          Judgment: Judgment normal

## 2022-03-19 NOTE — ED PROVIDER NOTES
History  Chief Complaint   Patient presents with    Flank Pain     left flank pain, began 4 days ago  seen at urgent care today and was told to go to ED     25-year-old female presents to the emergency department seeking evaluation for left lower quadrant pain and suprapubic discomfort  Patient states the pain is a dull ache  She denies vaginal discharge her UTI type symptoms  Pain began approximately 4 days ago  Overall the patient is well-appearing in no acute distress  No reported trauma  No history of diverticulitis  Allergies reviewed          Prior to Admission Medications   Prescriptions Last Dose Informant Patient Reported? Taking?    ALPRAZolam (XANAX) 0 25 mg tablet   Yes No   Sig: Take 0 25 mg by mouth every 24 hours   Patient not taking: Reported on 3/11/2022    ALPRAZolam (XANAX) 0 5 mg tablet   Yes No   CHOLECALCIFEROL PO   Yes No   Sig: Take 2,000 Units by mouth   Patient not taking: Reported on 3/11/2022    Multiple Vitamin (MULTI-DAY PO)   Yes No   Sig: Take by mouth   Patient not taking: Reported on 3/11/2022    Omega-3 Fatty Acids (FISH OIL PO)   Yes No   Sig: Take 2 g by mouth   ascorbic acid (VITAMIN C) 500 mg tablet  Self Yes No   Sig: Take 500 mg by mouth daily   Patient not taking: Reported on 3/11/2022    ferrous sulfate 325 (65 Fe) mg tablet  Self Yes No   Sig: Take 325 mg by mouth daily with breakfast   Patient not taking: Reported on 3/11/2022    iron polysaccharides (FERREX) 150 mg capsule   Yes No   Sig: Take 150 mg by mouth daily   Patient not taking: Reported on 1/1/2022    sertraline (ZOLOFT) 50 mg tablet   Yes No   Patient not taking: Reported on 3/11/2022    triamcinolone (KENALOG) 0 1 % cream   No No   Sig: Apply topically 3 (three) times a day   Patient not taking: Reported on 1/1/2022       Facility-Administered Medications: None       Past Medical History:   Diagnosis Date    Anxiety     Depression        Past Surgical History:   Procedure Laterality Date     SECTION      DILATION AND CURETTAGE, DIAGNOSTIC / THERAPEUTIC         History reviewed  No pertinent family history  I have reviewed and agree with the history as documented  E-Cigarette/Vaping    E-Cigarette Use Never User      E-Cigarette/Vaping Substances     Social History     Tobacco Use    Smoking status: Current Every Day Smoker    Smokeless tobacco: Never Used   Vaping Use    Vaping Use: Never used   Substance Use Topics    Alcohol use: Not on file    Drug use: Not on file       Review of Systems   Constitutional: Negative for chills, fatigue and fever  HENT: Negative for congestion, ear pain, rhinorrhea, sinus pressure, sneezing, sore throat and trouble swallowing  Eyes: Negative for discharge and itching  Respiratory: Negative for cough, chest tightness, shortness of breath, wheezing and stridor  Cardiovascular: Negative for chest pain and palpitations  Gastrointestinal: Positive for abdominal pain  Negative for diarrhea, nausea and vomiting  Neurological: Negative for dizziness, syncope, numbness and headaches  All other systems reviewed and are negative  Physical Exam  Physical Exam  Vitals and nursing note reviewed  Constitutional:       General: She is not in acute distress  Appearance: She is well-developed  She is not diaphoretic  HENT:      Head: Normocephalic and atraumatic  Right Ear: External ear normal       Left Ear: External ear normal       Nose: Nose normal    Eyes:      Conjunctiva/sclera: Conjunctivae normal       Pupils: Pupils are equal, round, and reactive to light  Cardiovascular:      Rate and Rhythm: Normal rate and regular rhythm  Heart sounds: Normal heart sounds  No murmur heard  No friction rub  No gallop  Pulmonary:      Effort: Pulmonary effort is normal  No respiratory distress  Breath sounds: Normal breath sounds  No stridor  No wheezing or rales     Abdominal:      General: Bowel sounds are normal  There is no distension  Palpations: Abdomen is soft  Tenderness: There is abdominal tenderness in the suprapubic area and left lower quadrant  There is no guarding  Musculoskeletal:         General: No tenderness  Normal range of motion  Cervical back: Normal range of motion  Skin:     General: Skin is warm  Capillary Refill: Capillary refill takes less than 2 seconds  Neurological:      Mental Status: She is alert and oriented to person, place, and time           Vital Signs  ED Triage Vitals [03/19/22 1048]   Temperature Pulse Respirations Blood Pressure SpO2   97 8 °F (36 6 °C) 97 18 (!) 157/105 96 %      Temp Source Heart Rate Source Patient Position - Orthostatic VS BP Location FiO2 (%)   Temporal Monitor Sitting Left arm --      Pain Score       5           Vitals:    03/19/22 1048   BP: (!) 157/105   Pulse: 97   Patient Position - Orthostatic VS: Sitting         Visual Acuity      ED Medications  Medications   iohexol (OMNIPAQUE) 350 MG/ML injection (SINGLE-DOSE) 100 mL (100 mL Intravenous Given 3/19/22 1216)       Diagnostic Studies  Results Reviewed     Procedure Component Value Units Date/Time    CMP [[id number]]  (Abnormal) Collected: 03/19/22 1118    Lab Status: Final result Specimen: Blood from Arm, Right Updated: 03/19/22 1159     Sodium 138 mmol/L      Potassium 4 2 mmol/L      Chloride 106 mmol/L      CO2 23 mmol/L      ANION GAP 9 mmol/L      BUN 7 mg/dL      Creatinine 0 66 mg/dL      Glucose 93 mg/dL      Calcium 9 4 mg/dL      AST 12 U/L      ALT 14 U/L      Alkaline Phosphatase 58 U/L      Total Protein 7 3 g/dL      Albumin 4 6 g/dL      Total Bilirubin 0 76 mg/dL      eGFR 112 ml/min/1 73sq m     Narrative:      [place] guidelines for Chronic Kidney Disease (CKD):     Stage 1 with normal or high GFR (GFR > 90 mL/min/1 73 square meters)    Stage 2 Mild CKD (GFR = 60-89 mL/min/1 73 square meters)    Stage 3A Moderate CKD (GFR = 45-59 mL/min/1 73 square meters)    Stage 3B Moderate CKD (GFR = 30-44 mL/min/1 73 square meters)    Stage 4 Severe CKD (GFR = 15-29 mL/min/1 73 square meters)    Stage 5 End Stage CKD (GFR <15 mL/min/1 73 square meters)  Note: GFR calculation is accurate only with a steady state creatinine    Lipase [[id number]]  (Normal) Collected: 03/19/22 1118    Lab Status: Final result Specimen: Blood from Arm, Right Updated: 03/19/22 1159     Lipase 28 u/L     Pregnancy, hCG, quantitative [[id number]]  (Normal) Collected: 03/19/22 1118    Lab Status: Final result Specimen: Blood from Arm, Right Updated: 03/19/22 1159     HCG, Quant <1 mIU/mL     Narrative:       Expected Ranges:     Approximate               Approximate HCG  Gestation age          Concentration ( mIU/mL)  _____________          ______________________   Darlyn Borjas                      HCG values  0 2-1                       5-50  1-2                           2-3                         100-5000  3-4                         500-37322  4-5                         1000-60305  5-6                         [id number]  6-8                         [id number]  8-12                        [id number]      CBC and differential [[id number]]  (Abnormal) Collected: 03/19/22 1118    Lab Status: Final result Specimen: Blood from Arm, Right Updated: 03/19/22 1129     WBC 9 29 Thousand/uL      RBC 5 18 Million/uL      Hemoglobin 12 7 g/dL      Hematocrit 40 6 %      MCV 78 fL      MCH 24 5 pg      MCHC 31 3 g/dL      RDW 15 0 %      MPV 10 1 fL      Platelets 210 Thousands/uL      nRBC 0 /100 WBCs      Neutrophils Relative 71 %      Immat GRANS % 0 %      Lymphocytes Relative 20 %      Monocytes Relative 8 %      Eosinophils Relative 0 %      Basophils Relative 1 %      Neutrophils Absolute 6 57 Thousands/µL      Immature Grans Absolute 0 04 Thousand/uL      Lymphocytes Absolute 1 88 Thousands/µL      Monocytes Absolute 0 71 Thousand/µL      Eosinophils Absolute 0 03 Thousand/µL Basophils Absolute 0 06 Thousands/µL     UA (URINE) with reflex to Scope [[id number]]  (Normal) Collected: 03/19/22 1118    Lab Status: Final result Specimen: Urine, Clean Catch Updated: 03/19/22 1126     Color, UA Yellow     Clarity, UA Clear     Specific Gravity, UA 1 010     pH, UA 7 0     Leukocytes, UA Negative     Nitrite, UA Negative     Protein, UA Negative mg/dl      Glucose, UA Negative mg/dl      Ketones, UA Negative mg/dl      Urobilinogen, UA 0 2 E U /dl      Bilirubin, UA Negative     Blood, UA Negative                 CT Abdomen pelvis with contrast   Final Result by Harvey Becerra MD (03/19 1337)      1  No acute abnormality in the abdomen or pelvis  2   Small hyperdense lesion which previous to arise from the left ovary  A solid focus is not excluded and a follow-up ultrasound is recommended  3   Ectopic left kidney in the inferior pelvis with small bilateral nonobstructing calculi  4   Small fibroid  The study was marked in [facility] for immediate notification  Workstation performed: [id number]                    Procedures  Procedures         ED Course                               SBIRT 22yo+      Most Recent Value   SBIRT (22 yo +)    In order to provide better care to our patients, we are screening all of our patients for alcohol and drug use  Would it be okay to ask you these screening questions? Yes Filed at: 03/19/2022 1122   Initial Alcohol Screen: US AUDIT-C     1  How often do you have a drink containing alcohol? 0 Filed at: 03/19/2022 1122   2  How many drinks containing alcohol do you have on a typical day you are drinking? 0 Filed at: 03/19/2022 1122   3a  Male UNDER 65: How often do you have five or more drinks on one occasion? 0 Filed at: 03/19/2022 1122   3b  FEMALE Any Age, or MALE 65+: How often do you have 4 or more drinks on one occassion?  0 Filed at: 03/19/2022 1122   Audit-C Score 0 Filed at: 03/19/2022 1122   EDILSON: How many times in the past year have you  Used an illegal drug or used a prescription medication for non-medical reasons? Never Filed at: 03/19/2022 1122                    MDM  Number of Diagnoses or Management Options  Fibroid  Lesion of left ovary  Diagnosis management comments: Imaging reveals a fibroid likely explain the patient's pain  Possible lesion of the left ovary recommend close outpatient follow-up with ultrasound  Patient states that she does have an upcoming appointment this week with her OBGYN  Patient verbalizes understanding of need for follow-up  Patient educated regarding their diagnosis and given return and follow-up instructions  Patient was advised to returned to the ED with worsening symptoms or concerns  Patient is understanding of and in agreement with the treatment plan  There are no questions at the time of discharge  Amount and/or Complexity of Data Reviewed  Clinical lab tests: ordered and reviewed  Tests in the radiology section of CPT®: ordered and reviewed    Risk of Complications, Morbidity, and/or Mortality  Presenting problems: low  Diagnostic procedures: low  Management options: low        Disposition  Final diagnoses:   Fibroid   Lesion of left ovary     Time reflects when diagnosis was documented in both MDM as applicable and the Disposition within this note     Time User Action Codes Description Comment    3/19/2022  1:46 PM Ventura Failing Add [D21 9] Fibroid     3/19/2022  1:46 PM Calixto Failing Add [N83 9] Lesion of left ovary       ED Disposition     ED Disposition Condition Date/Time Comment    Discharge Stable Sat Mar 19, 2022  1:46 PM Sarwat Kimbrough discharge to home/self care              Follow-up Information     Follow up With Specialties Details Why [address] Obstetrics and Gynecology, Obstetrics, Gynecology   [address] [facility] [id number] [phone]            Discharge Medication List as of 3/19/2022  1:47 PM      CONTINUE these medications which have NOT CHANGED    Details   !! ALPRAZolam (XANAX) 0 25 mg tablet Take 0 25 mg by mouth every 24 hours, Starting Thu 11/16/2017, Historical Med      !! ALPRAZolam Godfrey Devonshire) 0 5 mg tablet Starting Fri 11/19/2021, Historical Med      ascorbic acid (VITAMIN C) 500 mg tablet Take 500 mg by mouth daily, Historical Med      CHOLECALCIFEROL PO Take 2,000 Units by mouth, Historical Med      ferrous sulfate 325 (65 Fe) mg tablet Take 325 mg by mouth daily with breakfast, Historical Med      iron polysaccharides (FERREX) 150 mg capsule Take 150 mg by mouth daily, Starting Tue 7/20/2021, Until Wed 7/20/2022, Historical Med      Multiple Vitamin (MULTI-DAY PO) Take by mouth, Historical Med      Omega-3 Fatty Acids (FISH OIL PO) Take 2 g by mouth, Historical Med      sertraline (ZOLOFT) 50 mg tablet Starting Wed 2/6/2019, Historical Med      triamcinolone (KENALOG) 0 1 % cream Apply topically 3 (three) times a day, Starting Thu 6/17/2021, Normal       !! - Potential duplicate medications found  Please discuss with provider  No discharge procedures on file      PDMP Review     None          ED Provider  Electronically Signed by           Adelita Pinedo PA-C  03/19/22 1846

## 2022-03-19 NOTE — PATIENT INSTRUCTIONS
Proceed to the ER for further evaluation     [address] [place] [address], [address]

## 2022-03-19 NOTE — DISCHARGE INSTRUCTIONS
Please follow up with your OBGYN for your CT scan results  IMPRESSION:     1  No acute abnormality in the abdomen or pelvis  2   Small hyperdense lesion which previous to arise from the left ovary  A solid focus is not excluded and a follow-up ultrasound is recommended  3   Ectopic left kidney in the inferior pelvis with small bilateral nonobstructing calculi  4   Small fibroid

## 2022-03-21 LAB — BACTERIA UR CULT: NORMAL

## 2022-04-21 ENCOUNTER — HOSPITAL ENCOUNTER (OUTPATIENT)
Dept: ULTRASOUND IMAGING | Facility: HOSPITAL | Age: 37
Discharge: HOME | End: 2022-04-21
Attending: FAMILY MEDICINE
Payer: COMMERCIAL

## 2022-04-21 ENCOUNTER — HOSPITAL ENCOUNTER (OUTPATIENT)
Dept: ULTRASOUND IMAGING | Facility: HOSPITAL | Age: 37
Discharge: HOME | End: 2022-04-21
Payer: COMMERCIAL

## 2022-04-21 DIAGNOSIS — N83.201 BILATERAL OVARIAN CYSTS: ICD-10-CM

## 2022-04-21 DIAGNOSIS — N83.202 BILATERAL OVARIAN CYSTS: ICD-10-CM

## 2022-04-21 DIAGNOSIS — N21.8 CALCULUS OF OTHER LOWER URINARY TRACT LOCATION: ICD-10-CM

## 2022-04-21 PROCEDURE — 76830 TRANSVAGINAL US NON-OB: CPT

## 2022-04-21 PROCEDURE — 76856 US EXAM PELVIC COMPLETE: CPT

## 2022-04-21 PROCEDURE — 76700 US EXAM ABDOM COMPLETE: CPT

## 2022-05-10 ENCOUNTER — HOSPITAL ENCOUNTER (OUTPATIENT)
Dept: MRI IMAGING | Facility: HOSPITAL | Age: 37
Discharge: HOME | End: 2022-05-10
Attending: FAMILY MEDICINE
Payer: COMMERCIAL

## 2022-05-10 DIAGNOSIS — N83.202 UNSPECIFIED OVARIAN CYST, LEFT SIDE: ICD-10-CM

## 2022-05-10 DIAGNOSIS — N83.291 OTHER OVARIAN CYST, RIGHT SIDE: ICD-10-CM

## 2022-05-10 PROCEDURE — G1004 CDSM NDSC: HCPCS

## 2022-05-10 PROCEDURE — A9585 GADOBUTROL INJECTION: HCPCS | Performed by: FAMILY MEDICINE

## 2022-05-10 PROCEDURE — 72197 MRI PELVIS W/O & W/DYE: CPT

## 2022-05-10 RX ADMIN — GADOBUTROL 6 ML: 604.72 INJECTION INTRAVENOUS at 15:58

## 2022-06-03 ENCOUNTER — OFFICE VISIT (OUTPATIENT)
Dept: URGENT CARE | Facility: CLINIC | Age: 37
End: 2022-06-03
Payer: COMMERCIAL

## 2022-06-03 DIAGNOSIS — J06.9 VIRAL UPPER RESPIRATORY TRACT INFECTION: Primary | ICD-10-CM

## 2022-06-03 DIAGNOSIS — R05.1 ACUTE COUGH: ICD-10-CM

## 2022-06-03 DIAGNOSIS — J02.8 ACUTE PHARYNGITIS DUE TO OTHER SPECIFIED ORGANISMS: ICD-10-CM

## 2022-06-03 LAB
S PYO AG THROAT QL: NEGATIVE
SARS-COV-2 AG UPPER RESP QL IA: NEGATIVE
VALID CONTROL: NORMAL

## 2022-06-03 PROCEDURE — 87070 CULTURE OTHR SPECIMN AEROBIC: CPT | Performed by: NURSE PRACTITIONER

## 2022-06-03 PROCEDURE — 87880 STREP A ASSAY W/OPTIC: CPT | Performed by: NURSE PRACTITIONER

## 2022-06-03 PROCEDURE — 87811 SARS-COV-2 COVID19 W/OPTIC: CPT | Performed by: NURSE PRACTITIONER

## 2022-06-03 PROCEDURE — 99213 OFFICE O/P EST LOW 20 MIN: CPT | Performed by: NURSE PRACTITIONER

## 2022-06-03 NOTE — PATIENT INSTRUCTIONS
Your rapid A strep is negative  You have a throat culture pending  You are to download [facility] mychart for the results in 48-72 hours  You are to do warm salt water gargles 4 x daily for sorethroat  Robitussin for cough  Drink water  Follow up with your PCP  Go to the ED if symptoms worsen   Poct covid is negative

## 2022-06-03 NOTE — PROGRESS NOTES
[facility]        NAME: Sarah Andrews is a 43 y o  female  : 1985    MRN: [id number]  DATE: Divya 3, 2022  TIME: 11:03 AM    Assessment and Plan   Viral upper respiratory tract infection [J06 9]  1  Viral upper respiratory tract infection     2  Acute pharyngitis due to other specified organisms  POCT rapid strepA    Throat culture   3  Acute cough  Poct Covid 19 Rapid Antigen Test         Patient Instructions       Follow up with PCP in 3-5 days  Proceed to  ER if symptoms worsen  Your rapid A strep is negative  You have a throat culture pending  You are to download  mychart for the results in 48-72 hours  You are to do warm salt water gargles 4 x daily for sorethroat  Robitussin for cough  Drink water  Follow up with your PCP  Go to the ED if symptoms worsen   Poct covid is negative  Chief Complaint     Chief Complaint   Patient presents with    Nasal Congestion    Sore Throat         History of Present Illness       This is a 43year old female who states her son was tested her several weeks ago for strep and it was + and now she has had 2 days of sorethroat, nasal congestion, cough with burning in chest  She denies fevers, chills, but states has had some sweats  She is not covid vaccinated but states she did a test yesterday and it was negative but "did not stick it up my nose"  Review of Systems   Review of Systems   Constitutional: Negative  HENT: Positive for congestion and sore throat  Eyes: Negative  Respiratory: Positive for cough and chest tightness  Cardiovascular: Negative  Gastrointestinal: Negative  Endocrine: Negative  Genitourinary: Negative  Musculoskeletal: Negative  Skin: Negative  Allergic/Immunologic: Negative  Neurological: Negative  Hematological: Negative  Psychiatric/Behavioral: Negative            Current Medications       Current Outpatient Medications:     ALPRAZolam (XANAX) 0 25 mg tablet, Take 0 25 mg by mouth every 24 hours (Patient not taking: Reported on 3/11/2022 ), Disp: , Rfl:     ALPRAZolam (XANAX) 0 5 mg tablet, , Disp: , Rfl:     ascorbic acid (VITAMIN C) 500 mg tablet, Take 500 mg by mouth daily (Patient not taking: Reported on 3/11/2022 ), Disp: , Rfl:     CHOLECALCIFEROL PO, Take 2,000 Units by mouth (Patient not taking: Reported on 3/11/2022 ), Disp: , Rfl:     ferrous sulfate 325 (65 Fe) mg tablet, Take 325 mg by mouth daily with breakfast (Patient not taking: Reported on 3/11/2022 ), Disp: , Rfl:     iron polysaccharides (FERREX) 150 mg capsule, Take 150 mg by mouth daily (Patient not taking: Reported on 2022 ), Disp: , Rfl:     Multiple Vitamin (MULTI-DAY PO), Take by mouth (Patient not taking: Reported on 3/11/2022 ), Disp: , Rfl:     Omega-3 Fatty Acids (FISH OIL PO), Take 2 g by mouth, Disp: , Rfl:     sertraline (ZOLOFT) 50 mg tablet, , Disp: , Rfl: 3    triamcinolone (KENALOG) 0 1 % cream, Apply topically 3 (three) times a day (Patient not taking: Reported on 2022 ), Disp: 80 g, Rfl: 1    Current Allergies     Allergies as of 2022 - Reviewed 2022   Allergen Reaction Noted    Metronidazole GI Intolerance 2018    Azithromycin Palpitations 2017    Tramadol Palpitations 2016            The following portions of the patient's history were reviewed and updated as appropriate: allergies, current medications, past family history, past medical history, past social history, past surgical history and problem list      Past Medical History:   Diagnosis Date    Anxiety     Depression        Past Surgical History:   Procedure Laterality Date     SECTION      DILATION AND CURETTAGE, DIAGNOSTIC / THERAPEUTIC         History reviewed  No pertinent family history  Medications have been verified  Objective   There were no vitals taken for this visit  No LMP recorded         Physical Exam     Physical Exam  Vitals and nursing note reviewed  Constitutional:       General: She is not in acute distress  Appearance: She is well-developed and normal weight  She is not ill-appearing, toxic-appearing or diaphoretic  HENT:      Head: Normocephalic and atraumatic  Right Ear: Tympanic membrane and ear canal normal       Left Ear: Tympanic membrane and ear canal normal       Nose: No congestion or rhinorrhea  Mouth/Throat:      Mouth: Mucous membranes are moist  No oral lesions  Pharynx: Oropharynx is clear  Uvula midline  No pharyngeal swelling, oropharyngeal exudate, posterior oropharyngeal erythema or uvula swelling  Tonsils: No tonsillar exudate or tonsillar abscesses  Eyes:      Extraocular Movements:      Right eye: Normal extraocular motion  Left eye: Normal extraocular motion  Conjunctiva/sclera: Conjunctivae normal    Cardiovascular:      Rate and Rhythm: Normal rate and regular rhythm  Heart sounds: Normal heart sounds  Pulmonary:      Effort: Pulmonary effort is normal       Breath sounds: Normal breath sounds  Abdominal:      General: Bowel sounds are normal       Palpations: Abdomen is soft  Musculoskeletal:      Cervical back: Normal range of motion and neck supple  Skin:     General: Skin is warm and dry  Capillary Refill: Capillary refill takes less than 2 seconds  Neurological:      General: No focal deficit present  Mental Status: She is alert and oriented to person, place, and time  Psychiatric:         Mood and Affect: Mood normal          Behavior: Behavior normal            poct covid negative

## 2022-06-05 LAB — BACTERIA THROAT CULT: NORMAL

## 2022-06-15 ENCOUNTER — OFFICE VISIT (OUTPATIENT)
Dept: URGENT CARE | Facility: CLINIC | Age: 37
End: 2022-06-15
Payer: COMMERCIAL

## 2022-06-15 VITALS
BODY MASS INDEX: 24.72 KG/M2 | SYSTOLIC BLOOD PRESSURE: 128 MMHG | OXYGEN SATURATION: 97 % | DIASTOLIC BLOOD PRESSURE: 90 MMHG | TEMPERATURE: 98.1 F | HEART RATE: 75 BPM | WEIGHT: 144 LBS | RESPIRATION RATE: 18 BRPM

## 2022-06-15 DIAGNOSIS — K64.9 HEMORRHOIDS, UNSPECIFIED HEMORRHOID TYPE: Primary | ICD-10-CM

## 2022-06-15 PROCEDURE — 99213 OFFICE O/P EST LOW 20 MIN: CPT

## 2022-06-15 RX ORDER — OXYCODONE HYDROCHLORIDE 5 MG/1
5 TABLET ORAL EVERY 8 HOURS PRN
COMMUNITY
Start: 2022-05-24

## 2022-06-15 RX ORDER — VITAMIN B COMPLEX
TABLET ORAL
COMMUNITY
Start: 2022-04-25

## 2022-06-15 RX ORDER — NORETHINDRONE 0.35 MG/1
TABLET ORAL
COMMUNITY
Start: 2022-03-31

## 2022-06-15 RX ORDER — HYDROXYZINE HYDROCHLORIDE 25 MG/1
TABLET, FILM COATED ORAL
COMMUNITY
Start: 2022-05-19

## 2022-06-15 NOTE — PROGRESS NOTES
d    [facility]        NAME: Erin Saunders is a 43 y o  female  : 1985    MRN: [id number]  DATE: Divya 15, 2022  TIME: 8:57 AM    Assessment and Plan   Hemorrhoids, unspecified hemorrhoid type [K64 9]  1  Hemorrhoids, unspecified hemorrhoid type  pramoxine-hydrocortisone cream    Ambulatory Referral to General Surgery      External Exam completed with a chaperone, Isabel Kay RN  Single thrombosed hemorrhoid seen  Patient Instructions     Apply the cream as directed  Increase your fluids  Take an OTC stool softner such as colace  Avoid straining  Follow up with PCP in 3-5 days  Proceed to  ER if symptoms worsen  Chief Complaint     Chief Complaint   Patient presents with    Hemorrhoids     X 1 day         History of Present Illness       Patient is a 60FSR presenting with a bump over her anus  She states she noticed she had some itching and discomfort yesterday  She had hemorrhoids while she was pregnant but none recently  She denies any blood in her stool, abdominal pain, n/v/d  She states she has not been constipated recently  She has not applied anything topically  Review of Systems   Review of Systems   Constitutional: Negative for activity change, appetite change, chills and fever  Respiratory: Negative for shortness of breath  Cardiovascular: Negative for chest pain  Gastrointestinal: Negative for abdominal distention, abdominal pain, blood in stool, constipation, diarrhea, nausea, rectal pain and vomiting  Skin: Negative for pallor  Neurological: Negative for dizziness, weakness and headaches  All other systems reviewed and are negative          Current Medications       Current Outpatient Medications:     ALPRAZolam (XANAX) 0 5 mg tablet, , Disp: , Rfl:     cholecalciferol (VITAMIN D3) 25 mcg (1,000 units) tablet, , Disp: , Rfl:     Omega-3 Fatty Acids (FISH OIL PO), Take 2 g by mouth, Disp: , Rfl:     pramoxine-hydrocortisone cream, Apply topically 3 (three) times a day, Disp: 57 g, Rfl: 0    ALPRAZolam (XANAX) 0 25 mg tablet, Take 0 25 mg by mouth every 24 hours (Patient not taking: No sig reported), Disp: , Rfl:     ascorbic acid (VITAMIN C) 500 mg tablet, Take 500 mg by mouth daily (Patient not taking: No sig reported), Disp: , Rfl:     CHOLECALCIFEROL PO, Take 2,000 Units by mouth (Patient not taking: No sig reported), Disp: , Rfl:     ferrous sulfate 325 (65 Fe) mg tablet, Take 325 mg by mouth daily with breakfast (Patient not taking: No sig reported), Disp: , Rfl:     Jessica 0 35 MG tablet, , Disp: , Rfl:     hydrOXYzine HCL (ATARAX) 25 mg tablet, , Disp: , Rfl:     iron polysaccharides (FERREX) 150 mg capsule, Take 150 mg by mouth daily (Patient not taking: No sig reported), Disp: , Rfl:     Multiple Vitamin (MULTI-DAY PO), Take by mouth (Patient not taking: No sig reported), Disp: , Rfl:     oxyCODONE (ROXICODONE) 5 immediate release tablet, Take 5 mg by mouth every 8 (eight) hours as needed (Patient not taking: Reported on 6/15/2022), Disp: , Rfl:     sertraline (ZOLOFT) 50 mg tablet, , Disp: , Rfl: 3    triamcinolone (KENALOG) 0 1 % cream, Apply topically 3 (three) times a day (Patient not taking: No sig reported), Disp: 80 g, Rfl: 1    Current Allergies     Allergies as of 06/15/2022 - Reviewed 06/15/2022   Allergen Reaction Noted    Metronidazole GI Intolerance 2018    Azithromycin Palpitations 2017    Tramadol Palpitations 2016            The following portions of the patient's history were reviewed and updated as appropriate: allergies, current medications, past family history, past medical history, past social history, past surgical history and problem list      Past Medical History:   Diagnosis Date    Anxiety     Depression        Past Surgical History:   Procedure Laterality Date     SECTION      DILATION AND CURETTAGE, DIAGNOSTIC / THERAPEUTIC         History reviewed   No pertinent family history  Medications have been verified  Objective   /90   Pulse 75   Temp 98 1 °F (36 7 °C)   Resp 18   Wt 65 3 kg (144 lb)   SpO2 97%   BMI 24 72 kg/m²        Physical Exam     Physical Exam  Vitals and nursing note reviewed  Constitutional:       General: She is not in acute distress  Appearance: Normal appearance  She is normal weight  She is not ill-appearing or toxic-appearing  HENT:      Mouth/Throat:      Pharynx: Oropharynx is clear  Cardiovascular:      Rate and Rhythm: Normal rate  Pulses: Normal pulses  Pulmonary:      Effort: Pulmonary effort is normal    Abdominal:      General: There is no distension  Palpations: Abdomen is soft  Tenderness: There is no abdominal tenderness  Genitourinary:      Skin:     General: Skin is warm and dry  Capillary Refill: Capillary refill takes less than 2 seconds  Neurological:      General: No focal deficit present  Mental Status: She is alert and oriented to person, place, and time

## 2022-06-15 NOTE — PATIENT INSTRUCTIONS
Apply the cream as directed  Increase your fluids  Take an OTC stool softner such as colace  Avoid straining

## 2022-06-21 ENCOUNTER — TELEPHONE (OUTPATIENT)
Dept: SURGERY | Facility: CLINIC | Age: 37
End: 2022-06-21

## 2022-06-21 NOTE — TELEPHONE ENCOUNTER
Called patient and lm to reschedule appointment for today and gave her the options to come in tm or next week Tuesday

## 2022-06-22 ENCOUNTER — CONSULT (OUTPATIENT)
Dept: SURGERY | Facility: CLINIC | Age: 37
End: 2022-06-22
Payer: COMMERCIAL

## 2022-06-22 VITALS
WEIGHT: 148 LBS | DIASTOLIC BLOOD PRESSURE: 64 MMHG | HEART RATE: 93 BPM | HEIGHT: 64 IN | SYSTOLIC BLOOD PRESSURE: 122 MMHG | TEMPERATURE: 98.2 F | RESPIRATION RATE: 18 BRPM | BODY MASS INDEX: 25.27 KG/M2 | OXYGEN SATURATION: 100 %

## 2022-06-22 DIAGNOSIS — K64.5 THROMBOSED EXTERNAL HEMORRHOID: ICD-10-CM

## 2022-06-22 PROCEDURE — 99242 OFF/OP CONSLTJ NEW/EST SF 20: CPT | Performed by: SPECIALIST

## 2022-06-22 NOTE — PROGRESS NOTES
Assessment/Plan: Thrombosed external hemorrhoid  The patient is referred by her primary care physician for evaluation of the thrombosed external hemorrhoid, in retrospect she has had this for week, and only recently brought it to the attention of her PCP  The patient states that in the last day or so the pain is essentially resolved, he also states that the postoperative constipation which precipitated this event, has resolved as her incisional pain has improved  On exam there is an organized thrombosed external hemorrhoid, there is no blood clot eroding through the skin, there is no fluctuance, and surgical intervention at this point would only serve to prolong her symptoms  I did discuss with her the importance of diet and lifestyle modifications in maintaining healthy bowel habits  Also, that if she were to experience a recurrent external hemorrhoid, to bring it to attention as soon as possible, as he read enucleation of the blood clot, or excision of the thrombosed hemorrhoid performed within the 1st 72 hours, would likely be beneficial in resolving the symptoms  The patient declines a digital rectal exam or any further evaluations  I will see her back on an as-needed basis  Diagnoses and all orders for this visit:    Thrombosed external hemorrhoid  -     Ambulatory Referral to General Surgery          Subjective:      Patient ID: Seymour Garduno is a 43 y o  female  The patient presents for treatment of a thrombosed external hemorrhoid which she initially noted a week ago, and recently brought to the attention of her PCP  She is one month out from Left salpingo oophorectomy for a benign cyst, and developed constipation after her surgery which precipitated her hemorrhoid symptoms        The following portions of the patient's history were reviewed and updated as appropriate: allergies, current medications, past family history, past medical history, past social history, past surgical history and problem list     Review of Systems   Constitutional: Negative for chills and fever  Respiratory: Negative  Cardiovascular: Negative  Gastrointestinal: Positive for constipation  Negative for abdominal distention, nausea and vomiting  Abdominal pain: resolving incisional pain  Genitourinary: Negative  Skin: Wound: healing surgical incisions  Objective:      /64 (BP Location: Left arm, Patient Position: Sitting, Cuff Size: Large)   Pulse 93   Temp 98 2 °F (36 8 °C) (Temporal)   Resp 18   Ht 5' 3 5" (1 613 m)   Wt 67 1 kg (148 lb)   SpO2 100%   BMI 25 81 kg/m²          Physical Exam  Constitutional:       Appearance: Normal appearance  HENT:      Head: Normocephalic  Eyes:      Pupils: Pupils are equal, round, and reactive to light  Cardiovascular:      Rate and Rhythm: Normal rate  Heart sounds: Normal heart sounds  Pulmonary:      Effort: Pulmonary effort is normal       Breath sounds: Normal breath sounds  Abdominal:      General: Abdomen is flat  Palpations: Abdomen is soft  Comments: Healing port site incisions   Genitourinary:     Comments: Small firm, tender external hemorrhoid at anal verge, no fluctuance, no ecchymosis  Patient declined digital rectal exam   Musculoskeletal:         General: No swelling  Normal range of motion  Cervical back: Neck supple  Skin:     General: Skin is warm and dry  Neurological:      Mental Status: She is oriented to person, place, and time     Psychiatric:         Mood and Affect: Mood normal

## 2022-06-22 NOTE — PATIENT INSTRUCTIONS
You have a small thrombosed external hemorrhoid, that is becoming organized, as the blood clot within contracts  There is no benefit to trying to enucleate the the blood clot, or excise the hemorrhoid, doing so will only prolong the symptoms  What is appropriate is that you eat a high fiber diet, and change you lifestyle so that you don't linger on the toilet, or strain when you move your bowels  What will eventually happen is that you will develop a skin tag where the hemorrhoid was  If the skin tag becomes an issue with irritation, then  return, and it can be excised as an office procedure  Hemorrhoids   WHAT YOU NEED TO KNOW:   What are hemorrhoids? Hemorrhoids are swollen blood vessels inside your rectum (internal hemorrhoids) or on your anus (external hemorrhoids)  Sometimes a hemorrhoid may prolapse  This means it extends out of your anus  What increases my risk for hemorrhoids? Pregnancy or obesity    Straining or sitting for a long time during bowel movements    Liver disease    Weak muscles around the anus caused by older age, rectal surgery, or anal intercourse    A lack of physical activity    Chronic diarrhea or constipation    A low-fiber diet    What are the signs and symptoms of hemorrhoids? Pain or itching around your anus or inside your rectum    Swelling or bumps around your anus    Bright red blood in your bowel movement, on the toilet paper, or in the toilet bowl    Tissue bulging out of your anus (prolapsed hemorrhoids)    Incontinence (poor control over urine or bowel movements)    How are hemorrhoids diagnosed? Your healthcare provider will ask about your symptoms, the foods you eat, and your bowel movements  He or she will examine your anus for external hemorrhoids  You may need the following:  A digital rectal exam  is a test to check for hemorrhoids  Your healthcare provider will put a gloved finger inside your anus to feel for the hemorrhoids       An anoscopy  is a test that uses a scope (small tube with a light and camera on the end) to look at your hemorrhoids  How are hemorrhoids treated? Treatment will depend on your symptoms  You may need any of the following:  Medicines  can help decrease pain and swelling, and soften your bowel movement  The medicine may be a pill, pad, cream, or ointment  Procedures  may be used to shrink or remove your hemorrhoid  Examples include rubber-band ligation, sclerotherapy, and photocoagulation  These procedures may be done in your healthcare provider's office  Ask your healthcare provider for more information about these procedures  Surgery  may be needed to shrink or remove your hemorrhoids  How can I manage my symptoms? Apply ice on your anus for 15 to 20 minutes every hour or as directed  Use an ice pack, or put crushed ice in a plastic bag  Cover it with a towel before you apply it to your anus  Ice helps prevent tissue damage and decreases swelling and pain  Take a sitz bath  Fill a bathtub with 4 to 6 inches of warm water  You may also use a sitz bath pan that fits inside a toilet bowl  Sit in the sitz bath for 15 minutes  Do this 3 times a day, and after each bowel movement  The warm water can help decrease pain and swelling  Keep your anal area clean  Gently wash the area with warm water daily  Soap may irritate the area  After a bowel movement, wipe with moist towelettes or wet toilet paper  Dry toilet paper can irritate the area  How can I help prevent hemorrhoids? Do not strain to have a bowel movement  Do not sit on the toilet too long  These actions can increase pressure on the tissues in your rectum and anus  Drink plenty of liquids  Liquids can help prevent constipation  Ask how much liquid to drink each day and which liquids are best for you  Eat a variety of high-fiber foods  Examples include fruits, vegetables, and whole grains   Ask your healthcare provider how much fiber you need each day  You may need to take a fiber supplement  Exercise as directed  Exercise, such as walking, may make it easier to have a bowel movement  Ask your healthcare provider to help you create an exercise plan  Do not have anal sex  Anal sex can weaken the skin around your rectum and anus  Avoid heavy lifting  This can cause straining and increase your risk for another hemorrhoid  When should I seek immediate care? You have severe pain in your rectum or around your anus  You have severe pain in your abdomen and you are vomiting  You have bleeding from your anus that soaks through your underwear  When should I contact my healthcare provider? You have frequent and painful bowel movements  Your hemorrhoid looks or feels more swollen than usual      You do not have a bowel movement for 2 days or more  You see or feel tissue coming through your anus  You have questions or concerns about your condition or care  CARE AGREEMENT:   You have the right to help plan your care  Learn about your health condition and how it may be treated  Discuss treatment options with your healthcare providers to decide what care you want to receive  You always have the right to refuse treatment  The above information is an  only  It is not intended as medical advice for individual conditions or treatments  Talk to your doctor, nurse or pharmacist before following any medical regimen to see if it is safe and effective for you  © Copyright [employer] 2022 Information is for End User's use only and may not be sold, redistributed or otherwise used for commercial purposes   All illustrations and images included in CareNotes® are the copyrighted property of A D A M , Inc  or [address] [place] [address]

## 2022-06-22 NOTE — ASSESSMENT & PLAN NOTE
The patient is referred by her primary care physician for evaluation of the thrombosed external hemorrhoid, in retrospect she has had this for week, and only recently brought it to the attention of her PCP  The patient states that in the last day or so the pain is essentially resolved, he also states that the postoperative constipation which precipitated this event, has resolved as her incisional pain has improved  On exam there is an organized thrombosed external hemorrhoid, there is no blood clot eroding through the skin, there is no fluctuance, and surgical intervention at this point would only serve to prolong her symptoms  I did discuss with her the importance of diet and lifestyle modifications in maintaining healthy bowel habits  Also, that if she were to experience a recurrent external hemorrhoid, to bring it to attention as soon as possible, as he read enucleation of the blood clot, or excision of the thrombosed hemorrhoid performed within the 1st 72 hours, would likely be beneficial in resolving the symptoms  The patient declines a digital rectal exam or any further evaluations  I will see her back on an as-needed basis

## 2022-12-08 ENCOUNTER — OFFICE VISIT (OUTPATIENT)
Dept: URGENT CARE | Facility: CLINIC | Age: 37
End: 2022-12-08

## 2022-12-08 VITALS
OXYGEN SATURATION: 94 % | WEIGHT: 148 LBS | HEART RATE: 117 BPM | TEMPERATURE: 98.8 F | RESPIRATION RATE: 20 BRPM | BODY MASS INDEX: 25.81 KG/M2

## 2022-12-08 DIAGNOSIS — R68.89 FLU-LIKE SYMPTOMS: ICD-10-CM

## 2022-12-08 DIAGNOSIS — Z20.822 EXPOSURE TO CONFIRMED CASE OF COVID-19: ICD-10-CM

## 2022-12-08 DIAGNOSIS — B34.9 VIRAL ILLNESS: Primary | ICD-10-CM

## 2022-12-08 NOTE — PROGRESS NOTES
[facility]        NAME: Srikanth Nuñez is a 42 y o  female  : 1985    MRN: [id number]  DATE: 2022  TIME: 12:12 PM    Assessment and Plan   Viral illness [B34 9]  1  Viral illness        2  Exposure to confirmed case of COVID-19  Cov/Flu-Collected at [facility]      3  Flu-like symptoms              Patient Instructions       Follow up with PCP in 3-5 days  Proceed to  ER if symptoms worsen  You have flu like symptoms  You are to rest  Drink gatorade or pedialyte for rehydration  You are to eat a BRAT diet - bananas, rice, applesauce and toast   You may try imodium for diarrhea  Take tylenol or motrin for fever or pain  You are to download [facility] mychart for the results in 24-48 hours, this is how you will get your results  If you have questions, you will need to call the office or speak with your PCP  You are to mask x 10 days and mask if still symptomatic after the 10 days  Follow up with your PCP in 2-3 days  Go to the ED if symptoms worsen  You appear to have covid/symptoms  You have a covid test pending  You are to download [facility] mychart for the results in 24-48 hours  You will be notified if results are +  You are to take vitamin C, D, robitussin for cough  Do not take cough suppressants; you want to take an expectorant  Sleep on your stomach  You are to quarantine as required per CDC guidelines  Mask x 10 days if positive  Mask as long as you have symptoms  See your PCP for follow up in 2-3 days  Go to the ED if symptoms worsen or are severe  Call your PCP if you are interested in taking the Paxlovid treatment for covid      Take OTC symptomatic relief as needed for symptoms           Chief Complaint     Chief Complaint   Patient presents with   • Headache   • Abdominal Pain     Doctors note         History of Present Illness       This is a 42year old female who states started yesterday with chills, headache, abdominal pain, nausea, and diarrhea  She is not covid nor flu vaccinated  She states she was exposed to covid at work  Denies actual fevers, vomiting  Review of Systems   Review of Systems   Constitutional: Positive for chills  HENT: Positive for congestion  Eyes: Negative  Respiratory: Positive for cough  Cardiovascular: Negative  Gastrointestinal: Positive for abdominal pain, diarrhea and nausea  Negative for vomiting  Endocrine: Negative  Genitourinary: Negative  Musculoskeletal: Negative  Skin: Negative  Allergic/Immunologic: Negative  Neurological: Negative  Hematological: Negative  Psychiatric/Behavioral: Negative            Current Medications       Current Outpatient Medications:   •  ALPRAZolam (XANAX) 0 25 mg tablet, Take 0 25 mg by mouth every 24 hours (Patient not taking: No sig reported), Disp: , Rfl:   •  ALPRAZolam (XANAX) 0 5 mg tablet, , Disp: , Rfl:   •  ascorbic acid (VITAMIN C) 500 mg tablet, Take 500 mg by mouth daily (Patient not taking: No sig reported), Disp: , Rfl:   •  cholecalciferol (VITAMIN D3) 25 mcg (1,000 units) tablet, , Disp: , Rfl:   •  CHOLECALCIFEROL PO, Take 2,000 Units by mouth (Patient not taking: No sig reported), Disp: , Rfl:   •  ferrous sulfate 325 (65 Fe) mg tablet, Take 325 mg by mouth daily with breakfast (Patient not taking: No sig reported), Disp: , Rfl:   •  Jessica 0 35 MG tablet, , Disp: , Rfl:   •  hydrocortisone 2 5 % cream, Apply 1 application topically 2 (two) times a day (Patient not taking: Reported on 6/22/2022), Disp: , Rfl:   •  hydrOXYzine HCL (ATARAX) 25 mg tablet, , Disp: , Rfl:   •  iron polysaccharides (FERREX) 150 mg capsule, Take 150 mg by mouth daily, Disp: , Rfl:   •  Multiple Vitamin (MULTI-DAY PO), Take by mouth, Disp: , Rfl:   •  Omega-3 Fatty Acids (FISH OIL PO), Take 2 g by mouth, Disp: , Rfl:   •  oxyCODONE (ROXICODONE) 5 immediate release tablet, Take 5 mg by mouth every 8 (eight) hours as needed (Patient not taking: No sig reported), Disp: , Rfl:   •  pramoxine-hydrocortisone cream, Apply topically 3 (three) times a day (Patient not taking: Reported on 2022), Disp: 57 g, Rfl: 0  •  sertraline (ZOLOFT) 50 mg tablet, , Disp: , Rfl: 3  •  triamcinolone (KENALOG) 0 1 % cream, Apply topically 3 (three) times a day (Patient not taking: No sig reported), Disp: 80 g, Rfl: 1    Current Allergies     Allergies as of 2022 - Reviewed 2022   Allergen Reaction Noted   • Metronidazole GI Intolerance 2018   • Azithromycin Palpitations 2017   • Tramadol Palpitations 2016            The following portions of the patient's history were reviewed and updated as appropriate: allergies, current medications, past family history, past medical history, past social history, past surgical history and problem list      Past Medical History:   Diagnosis Date   • Anxiety    • Depression        Past Surgical History:   Procedure Laterality Date   •  SECTION     • DILATION AND CURETTAGE, DIAGNOSTIC / THERAPEUTIC         History reviewed  No pertinent family history  Medications have been verified  Objective   Pulse (!) 117   Temp 98 8 °F (37 1 °C)   Resp 20   Wt 67 1 kg (148 lb)   SpO2 94%   BMI 25 81 kg/m²   No LMP recorded  Physical Exam     Physical Exam  Vitals and nursing note reviewed  Constitutional:       General: She is not in acute distress  Appearance: She is well-developed and normal weight  She is not ill-appearing, toxic-appearing or diaphoretic  HENT:      Head: Normocephalic and atraumatic  Mouth/Throat:      Mouth: Mucous membranes are moist       Pharynx: Oropharynx is clear  Eyes:      Extraocular Movements: Extraocular movements intact  Pupils: Pupils are equal, round, and reactive to light  Cardiovascular:      Rate and Rhythm: Normal rate and regular rhythm  Heart sounds: Normal heart sounds  No murmur heard    Pulmonary:      Effort: Pulmonary effort is normal  No respiratory distress  Breath sounds: Normal breath sounds  No stridor  No wheezing, rhonchi or rales  Chest:      Chest wall: No tenderness  Abdominal:      Palpations: Abdomen is soft  Tenderness: There is no abdominal tenderness  Skin:     General: Skin is warm and dry  Neurological:      General: No focal deficit present  Mental Status: She is alert and oriented to person, place, and time     Psychiatric:         Mood and Affect: Mood normal          Behavior: Behavior normal

## 2022-12-08 NOTE — LETTER
December 8, 2022     Patient: Abby Gallardo   YOB: 1985   Date of Visit: 12/8/2022       To Whom It May Concern: It is my medical opinion that Tavon Queen may return to work on 12/12/2022  If you have any questions or concerns, please don't hesitate to call           Sincerely,        BRANDON Gutierrez    CC: No Recipients

## 2022-12-08 NOTE — PATIENT INSTRUCTIONS
You have flu like symptoms  You are to rest  Drink gatorade or pedialyte for rehydration  You are to eat a BRAT diet - bananas, rice, applesauce and toast   You may try imodium for diarrhea  Take tylenol or motrin for fever or pain  You are to download [facility] mychart for the results in 24-48 hours, this is how you will get your results  If you have questions, you will need to call the office or speak with your PCP  You are to mask x 10 days and mask if still symptomatic after the 10 days  Follow up with your PCP in 2-3 days  Go to the ED if symptoms worsen  You appear to have covid/symptoms  You have a covid test pending  You are to download [facility] mychart for the results in 24-48 hours  You will be notified if results are +  You are to take vitamin C, D, robitussin for cough  Do not take cough suppressants; you want to take an expectorant  Sleep on your stomach  You are to quarantine as required per CDC guidelines  Mask x 10 days if positive  Mask as long as you have symptoms  See your PCP for follow up in 2-3 days  Go to the ED if symptoms worsen or are severe  Call your PCP if you are interested in taking the Paxlovid treatment for covid      Take OTC symptomatic relief as needed for symptoms

## 2022-12-10 LAB
FLUAV RNA RESP QL NAA+PROBE: NEGATIVE
FLUBV RNA RESP QL NAA+PROBE: NEGATIVE
SARS-COV-2 RNA RESP QL NAA+PROBE: POSITIVE

## 2022-12-23 ENCOUNTER — OFFICE VISIT (OUTPATIENT)
Dept: URGENT CARE | Facility: CLINIC | Age: 37
End: 2022-12-23

## 2022-12-23 VITALS
BODY MASS INDEX: 25.81 KG/M2 | OXYGEN SATURATION: 98 % | WEIGHT: 148 LBS | HEART RATE: 102 BPM | RESPIRATION RATE: 20 BRPM | TEMPERATURE: 98.7 F

## 2022-12-23 DIAGNOSIS — N89.8 VAGINAL ODOR: ICD-10-CM

## 2022-12-23 DIAGNOSIS — R10.9 ABDOMINAL DISCOMFORT: Primary | ICD-10-CM

## 2022-12-23 LAB
SL AMB  POCT GLUCOSE, UA: NORMAL
SL AMB LEUKOCYTE ESTERASE,UA: NORMAL
SL AMB POCT BILIRUBIN,UA: NORMAL
SL AMB POCT BLOOD,UA: NORMAL
SL AMB POCT CLARITY,UA: CLEAR
SL AMB POCT COLOR,UA: NORMAL
SL AMB POCT KETONES,UA: NORMAL
SL AMB POCT NITRITE,UA: NORMAL
SL AMB POCT PH,UA: 7.5
SL AMB POCT SPECIFIC GRAVITY,UA: 1015
SL AMB POCT URINE HCG: NORMAL
SL AMB POCT URINE PROTEIN: NORMAL
SL AMB POCT UROBILINOGEN: 0.2

## 2022-12-23 NOTE — PROGRESS NOTES
[facility]        NAME: Glynn Amador is a 42 y o  female  : 1985    MRN: [id number]  DATE: 2022  TIME: 12:46 PM    Assessment and Plan   Abdominal discomfort [R10 9]  1  Abdominal discomfort  POCT urine dip    Chlamydia/GC amplified DNA by PCR    POCT urine HCG    Trichomonas vaginalis/Mycoplasma genitalium PCR    Trichomonas vaginalis/Mycoplasma genitalium PCR      2  Vaginal odor  Trichomonas vaginalis/Mycoplasma genitalium PCR    Trichomonas vaginalis/Mycoplasma genitalium PCR            Patient Instructions       Follow up with PCP in 3-5 days  Proceed to  ER if symptoms worsen  Your urine in the office was negative for an infection, negative pregnancy  You have a GC chlamydia and trichomonas pending - you are to download [facility] for the results in 3-5 days  You will be notified if abnormal   Your doctor called Clindamycin in to a pharmacy  Please speak with them regarding prescription  Follow up with your PCP or OBGYN next week  Go to the ED if symptoms worsen        Chief Complaint     Chief Complaint   Patient presents with   • Possible UTI         History of Present Illness       This is a 42year old female who states has an upset stomach and is concerned this could be anxiety, IBS or a UTI  She states she has a vaginal odor as well but no discharge  She states she would like to have a urine test done  Pt is made aware that there is a prescription called to a pharmacy by her OBGYN - pt is not aware of this  Pt is not able to take flagyl - clindamycin was called in  Review of Systems   Review of Systems   Constitutional: Negative  HENT: Negative  Eyes: Negative  Respiratory: Negative  Cardiovascular: Negative  Gastrointestinal: Negative  Upset stomach    Endocrine: Negative  Genitourinary: Negative for vaginal discharge  Vaginal odor    Musculoskeletal: Negative  Skin: Negative  Allergic/Immunologic: Negative  Hematological: Negative  Psychiatric/Behavioral: Negative            Current Medications       Current Outpatient Medications:   •  ALPRAZolam (XANAX) 0 25 mg tablet, Take 0 25 mg by mouth every 24 hours (Patient not taking: No sig reported), Disp: , Rfl:   •  ALPRAZolam (XANAX) 0 5 mg tablet, , Disp: , Rfl:   •  ascorbic acid (VITAMIN C) 500 mg tablet, Take 500 mg by mouth daily (Patient not taking: No sig reported), Disp: , Rfl:   •  cholecalciferol (VITAMIN D3) 25 mcg (1,000 units) tablet, , Disp: , Rfl:   •  CHOLECALCIFEROL PO, Take 2,000 Units by mouth (Patient not taking: No sig reported), Disp: , Rfl:   •  ferrous sulfate 325 (65 Fe) mg tablet, Take 325 mg by mouth daily with breakfast (Patient not taking: No sig reported), Disp: , Rfl:   •  Jessica 0 35 MG tablet, , Disp: , Rfl:   •  hydrocortisone 2 5 % cream, Apply 1 application topically 2 (two) times a day (Patient not taking: Reported on 6/22/2022), Disp: , Rfl:   •  hydrOXYzine HCL (ATARAX) 25 mg tablet, , Disp: , Rfl:   •  iron polysaccharides (FERREX) 150 mg capsule, Take 150 mg by mouth daily, Disp: , Rfl:   •  Multiple Vitamin (MULTI-DAY PO), Take by mouth, Disp: , Rfl:   •  Omega-3 Fatty Acids (FISH OIL PO), Take 2 g by mouth, Disp: , Rfl:   •  oxyCODONE (ROXICODONE) 5 immediate release tablet, Take 5 mg by mouth every 8 (eight) hours as needed (Patient not taking: No sig reported), Disp: , Rfl:   •  pramoxine-hydrocortisone cream, Apply topically 3 (three) times a day (Patient not taking: Reported on 6/22/2022), Disp: 57 g, Rfl: 0  •  sertraline (ZOLOFT) 50 mg tablet, , Disp: , Rfl: 3  •  triamcinolone (KENALOG) 0 1 % cream, Apply topically 3 (three) times a day (Patient not taking: No sig reported), Disp: 80 g, Rfl: 1    Current Allergies     Allergies as of 12/23/2022 - Reviewed 12/23/2022   Allergen Reaction Noted   • Metronidazole GI Intolerance 03/09/2018   • Azithromycin Palpitations 04/03/2017   • Tramadol Palpitations 04/04/2016 The following portions of the patient's history were reviewed and updated as appropriate: allergies, current medications, past family history, past medical history, past social history, past surgical history and problem list      Past Medical History:   Diagnosis Date   • Anxiety    • Depression        Past Surgical History:   Procedure Laterality Date   •  SECTION     • DILATION AND CURETTAGE, DIAGNOSTIC / THERAPEUTIC         History reviewed  No pertinent family history  Medications have been verified  Objective   Pulse 102   Temp 98 7 °F (37 1 °C)   Resp 20   Wt 67 1 kg (148 lb)   SpO2 98%   BMI 25 81 kg/m²   No LMP recorded  Physical Exam     Physical Exam  Vitals and nursing note reviewed  Constitutional:       General: She is not in acute distress  Appearance: Normal appearance  She is normal weight  She is not ill-appearing, toxic-appearing or diaphoretic  HENT:      Head: Normocephalic and atraumatic  Eyes:      Extraocular Movements: Extraocular movements intact  Cardiovascular:      Rate and Rhythm: Normal rate and regular rhythm  Pulses: Normal pulses  Heart sounds: Normal heart sounds  Pulmonary:      Effort: Pulmonary effort is normal       Breath sounds: Normal breath sounds  Abdominal:      Tenderness: There is no right CVA tenderness or left CVA tenderness  Musculoskeletal:         General: Normal range of motion  Cervical back: Normal range of motion and neck supple  Skin:     General: Skin is warm and dry  Capillary Refill: Capillary refill takes less than 2 seconds  Neurological:      General: No focal deficit present  Mental Status: She is alert and oriented to person, place, and time  Psychiatric:         Mood and Affect: Mood normal          Behavior: Behavior normal          Thought Content:  Thought content normal          Judgment: Judgment normal        poct urine negative for infection  hcg negative

## 2022-12-23 NOTE — PATIENT INSTRUCTIONS
Your urine in the office was negative for an infection, negative pregnancy  You have a GC chlamydia and trichomonas pending - you are to download [employer] for the results in 3-5 days  You will be notified if abnormal   Your doctor called Clindamycin in to a pharmacy  Please speak with them regarding prescription    Follow up with your PCP or OBGYN next week  Go to the ED if symptoms worsen

## 2022-12-24 LAB
C TRACH DNA SPEC QL NAA+PROBE: NEGATIVE
M GENITALIUM DNA SPEC QL NAA+PROBE: NEGATIVE
N GONORRHOEA DNA SPEC QL NAA+PROBE: NEGATIVE
T VAGINALIS DNA SPEC QL NAA+PROBE: NEGATIVE

## 2023-02-24 ENCOUNTER — OFFICE VISIT (OUTPATIENT)
Dept: URGENT CARE | Facility: CLINIC | Age: 38
End: 2023-02-24

## 2023-02-24 VITALS
DIASTOLIC BLOOD PRESSURE: 72 MMHG | HEIGHT: 63 IN | BODY MASS INDEX: 26.93 KG/M2 | OXYGEN SATURATION: 99 % | RESPIRATION RATE: 16 BRPM | HEART RATE: 100 BPM | TEMPERATURE: 98 F | WEIGHT: 152 LBS | SYSTOLIC BLOOD PRESSURE: 124 MMHG

## 2023-02-24 DIAGNOSIS — Z72.0 TOBACCO USE: ICD-10-CM

## 2023-02-24 DIAGNOSIS — R10.30 LOWER ABDOMINAL PAIN: ICD-10-CM

## 2023-02-24 DIAGNOSIS — J02.8 ACUTE PHARYNGITIS DUE TO OTHER SPECIFIED ORGANISMS: Primary | ICD-10-CM

## 2023-02-24 DIAGNOSIS — Z87.440 HISTORY OF RECURRENT UTIS: ICD-10-CM

## 2023-02-24 DIAGNOSIS — Z20.818 STREP THROAT EXPOSURE: ICD-10-CM

## 2023-02-24 LAB
S PYO AG THROAT QL: NEGATIVE
SL AMB  POCT GLUCOSE, UA: NEGATIVE
SL AMB LEUKOCYTE ESTERASE,UA: NEGATIVE
SL AMB POCT BILIRUBIN,UA: NEGATIVE
SL AMB POCT BLOOD,UA: NEGATIVE
SL AMB POCT CLARITY,UA: CLEAR
SL AMB POCT COLOR,UA: YELLOW
SL AMB POCT KETONES,UA: NEGATIVE
SL AMB POCT NITRITE,UA: NEGATIVE
SL AMB POCT PH,UA: 7.5
SL AMB POCT SPECIFIC GRAVITY,UA: 1
SL AMB POCT URINE PROTEIN: NEGATIVE
SL AMB POCT UROBILINOGEN: 0.2

## 2023-02-24 RX ORDER — PENICILLIN V POTASSIUM 500 MG/1
500 TABLET ORAL 2 TIMES DAILY
Qty: 20 TABLET | Refills: 0 | Status: SHIPPED | OUTPATIENT
Start: 2023-02-24 | End: 2023-03-06

## 2023-02-24 NOTE — PROGRESS NOTES
[facility]        NAME: Jeyson Veliz is a 42 y o  female  : 1985    MRN: [id number]  DATE: 2023  TIME: 9:08 AM    Assessment and Plan   Acute pharyngitis due to other specified organisms [J02 8]  1  Acute pharyngitis due to other specified organisms  POCT rapid strepA    Throat culture    penicillin V potassium (VEETID) 500 mg tablet      2  Strep throat exposure  POCT rapid strepA    Throat culture    penicillin V potassium (VEETID) 500 mg tablet      3  Lower abdominal pain  POCT urine dip      4  History of recurrent UTIs        5  Tobacco use              Patient Instructions       Follow up with PCP in 3-5 days  Proceed to  ER if symptoms worsen  Your strep A is negative  You have a throat culture pending  You are to download [facility] mychart for the results in 3-4 days  You will be notified if the results are +  You are being prescribed Penicillin for possible strep  You are to do warm salt water gargles 4 x daily  Drink warm tea with honey and lemon  Take tylenol or motrin as able for pain or fever  Chloraseptic throat spray, cough drops  Do not share utensils  Change your tooth brush in 3 days  Follow up with your PCP in 2-3 days  Go to the ED if symptoms worsen      Stop smoking  Your urine shows bacteria  You have been prescribed an antibiotic  You are to take all medication as prescribed  You are to avoid alcohol and caffeine - they are bladder irritants  Drink water  Take tylenol or motrin for pain or fever  You are to download [facility] mychart for the results in 3-5 days  You will be notified if the antibiotic needs changed  Chief Complaint     Chief Complaint   Patient presents with   • Sore Throat     Exposed to Son who is positive for strep         History of Present Illness       This is a 42year old female who states son was + strep throat Group C and yesterday she developed a sorethroat  She denies fevers, chills, n/v/d    She states she would like to have a urine tested too to make sure she doesn't have a UTI  She states she is having intermittent lower abdominal pain and has hx of chronic UTI's  Denies hematuria, pregnancy  Review of Systems   Review of Systems   Constitutional: Negative  HENT: Positive for sore throat  Eyes: Negative  Gastrointestinal: Positive for abdominal pain  Endocrine: Negative  Genitourinary: Negative  Musculoskeletal: Negative  Skin: Negative  Allergic/Immunologic: Negative  Neurological: Negative  Hematological: Negative  Psychiatric/Behavioral: Negative            Current Medications       Current Outpatient Medications:   •  penicillin V potassium (VEETID) 500 mg tablet, Take 1 tablet (500 mg total) by mouth 2 (two) times a day for 10 days, Disp: 20 tablet, Rfl: 0  •  ALPRAZolam (XANAX) 0 25 mg tablet, Take 0 25 mg by mouth every 24 hours (Patient not taking: No sig reported), Disp: , Rfl:   •  ALPRAZolam (XANAX) 0 5 mg tablet, , Disp: , Rfl:   •  ascorbic acid (VITAMIN C) 500 mg tablet, Take 500 mg by mouth daily (Patient not taking: No sig reported), Disp: , Rfl:   •  cholecalciferol (VITAMIN D3) 25 mcg (1,000 units) tablet, , Disp: , Rfl:   •  CHOLECALCIFEROL PO, Take 2,000 Units by mouth (Patient not taking: No sig reported), Disp: , Rfl:   •  ferrous sulfate 325 (65 Fe) mg tablet, Take 325 mg by mouth daily with breakfast (Patient not taking: No sig reported), Disp: , Rfl:   •  Jessica 0 35 MG tablet, , Disp: , Rfl:   •  hydrocortisone 2 5 % cream, Apply 1 application topically 2 (two) times a day (Patient not taking: Reported on 6/22/2022), Disp: , Rfl:   •  hydrOXYzine HCL (ATARAX) 25 mg tablet, , Disp: , Rfl:   •  iron polysaccharides (FERREX) 150 mg capsule, Take 150 mg by mouth daily, Disp: , Rfl:   •  Multiple Vitamin (MULTI-DAY PO), Take by mouth, Disp: , Rfl:   •  Omega-3 Fatty Acids (FISH OIL PO), Take 2 g by mouth, Disp: , Rfl:   •  oxyCODONE (ROXICODONE) 5 immediate release tablet, Take 5 mg by mouth every 8 (eight) hours as needed (Patient not taking: No sig reported), Disp: , Rfl:   •  pramoxine-hydrocortisone cream, Apply topically 3 (three) times a day (Patient not taking: Reported on 2022), Disp: 57 g, Rfl: 0  •  sertraline (ZOLOFT) 50 mg tablet, , Disp: , Rfl: 3  •  triamcinolone (KENALOG) 0 1 % cream, Apply topically 3 (three) times a day (Patient not taking: No sig reported), Disp: 80 g, Rfl: 1    Current Allergies     Allergies as of 2023 - Reviewed 2023   Allergen Reaction Noted   • Metronidazole GI Intolerance 2018   • Azithromycin Palpitations 2017   • Tramadol Palpitations 2016            The following portions of the patient's history were reviewed and updated as appropriate: allergies, current medications, past family history, past medical history, past social history, past surgical history and problem list      Past Medical History:   Diagnosis Date   • Anxiety    • Depression        Past Surgical History:   Procedure Laterality Date   •  SECTION     • DILATION AND CURETTAGE, DIAGNOSTIC / THERAPEUTIC         History reviewed  No pertinent family history  Medications have been verified  Objective   /72   Pulse 100   Temp 98 °F (36 7 °C)   Resp 16   Ht 5' 3" (1 6 m)   Wt 68 9 kg (152 lb)   SpO2 99%   BMI 26 93 kg/m²   No LMP recorded  Physical Exam     Physical Exam  Vitals and nursing note reviewed  Constitutional:       General: She is not in acute distress  Appearance: She is well-developed and normal weight  She is not ill-appearing, toxic-appearing or diaphoretic  HENT:      Head: Normocephalic and atraumatic  Right Ear: Tympanic membrane and ear canal normal       Left Ear: Tympanic membrane and ear canal normal       Nose: No congestion or rhinorrhea  Mouth/Throat:      Mouth: Mucous membranes are moist  No oral lesions  Pharynx: Oropharynx is clear   Uvula midline  No pharyngeal swelling, oropharyngeal exudate, posterior oropharyngeal erythema or uvula swelling  Tonsils: No tonsillar exudate or tonsillar abscesses  0 on the right  0 on the left  Eyes:      Extraocular Movements:      Right eye: Normal extraocular motion  Left eye: Normal extraocular motion  Cardiovascular:      Rate and Rhythm: Normal rate and regular rhythm  Heart sounds: Normal heart sounds  No murmur heard  Pulmonary:      Effort: Pulmonary effort is normal       Breath sounds: Wheezing present  Abdominal:      Comments: No CVA tenderness    Musculoskeletal:      Cervical back: Normal range of motion and neck supple  Lymphadenopathy:      Cervical: No cervical adenopathy  Skin:     Capillary Refill: Capillary refill takes less than 2 seconds  Neurological:      General: No focal deficit present  Mental Status: She is alert and oriented to person, place, and time  Psychiatric:         Mood and Affect: Mood normal          Behavior: Behavior normal            Strep a negative  poct urine negative

## 2023-02-24 NOTE — PATIENT INSTRUCTIONS
Your strep A is negative  You have a throat culture pending  You are to download [facility] mychart for the results in 3-4 days  You will be notified if the results are +  You are being prescribed Penicillin for possible strep  You are to do warm salt water gargles 4 x daily  Drink warm tea with honey and lemon  Take tylenol or motrin as able for pain or fever  Chloraseptic throat spray, cough drops  Do not share utensils  Change your tooth brush in 3 days  Follow up with your PCP in 2-3 days  Go to the ED if symptoms worsen      Stop smoking  Your urine shows bacteria  You have been prescribed an antibiotic  You are to take all medication as prescribed  You are to avoid alcohol and caffeine - they are bladder irritants  Drink water  Take tylenol or motrin for pain or fever  You are to download [facility] mychart for the results in 3-5 days  You will be notified if the antibiotic needs changed

## 2023-02-26 LAB — BACTERIA THROAT CULT: NORMAL

## 2023-04-02 ENCOUNTER — OFFICE VISIT (OUTPATIENT)
Dept: URGENT CARE | Facility: CLINIC | Age: 38
End: 2023-04-02

## 2023-04-02 VITALS
BODY MASS INDEX: 25.16 KG/M2 | OXYGEN SATURATION: 100 % | HEART RATE: 98 BPM | SYSTOLIC BLOOD PRESSURE: 130 MMHG | WEIGHT: 147.4 LBS | TEMPERATURE: 97.9 F | HEIGHT: 64 IN | DIASTOLIC BLOOD PRESSURE: 72 MMHG | RESPIRATION RATE: 20 BRPM

## 2023-04-02 DIAGNOSIS — K21.9 GASTROESOPHAGEAL REFLUX DISEASE, UNSPECIFIED WHETHER ESOPHAGITIS PRESENT: Primary | ICD-10-CM

## 2023-04-02 RX ORDER — OMEPRAZOLE 20 MG/1
20 CAPSULE, DELAYED RELEASE ORAL DAILY
Qty: 30 CAPSULE | Refills: 0 | Status: SHIPPED | OUTPATIENT
Start: 2023-04-02

## 2023-04-02 NOTE — PROGRESS NOTES
[facility]    NAME: Lisa Fitch is a 42 y o  female  : 1985    MRN: [id number]  DATE: 2023  TIME: 3:25 PM    Assessment and Plan   Gastroesophageal reflux disease, unspecified whether esophagitis present [K21 9]  1  Gastroesophageal reflux disease, unspecified whether esophagitis present  omeprazole (PriLOSEC) 20 mg delayed release capsule    Ambulatory Referral to Gastroenterology          Patient Instructions     Patient Instructions   Start prilosec  Follow up with pcp/ GI  Avoid aggravating foods  Chief Complaint     Chief Complaint   Patient presents with   • Heartburn     At night time last couple weeks  Unrelieved with tums or pepto bismal       History of Present Illness   Patient is a 41 yo female presenting to office with reflux for 2 weeks  She states that she has had 5 episodes of chest/upper abdominal pain at night in the past 2 weeks  She has symptoms of belching and reflux that will last a couple hours  She has been taking tums and pepto bismul  Denies fevers, chills, headaches, changes in vision or hearing, n/v/d, numbness, weakness  Review of Systems   Review of Systems   Constitutional: Negative for chills and fever  HENT: Negative for congestion and sore throat  Respiratory: Negative for chest tightness and shortness of breath  Cardiovascular: Negative for chest pain  Gastrointestinal: Positive for abdominal pain  Negative for diarrhea, nausea and vomiting  Neurological: Negative for dizziness, weakness, numbness and headaches         Current Medications     Current Outpatient Medications:   •  ALPRAZolam (XANAX) 0 5 mg tablet, , Disp: , Rfl:   •  Multiple Vitamin (MULTI-DAY PO), Take by mouth, Disp: , Rfl:   •  Omega-3 Fatty Acids (FISH OIL PO), Take 2 g by mouth, Disp: , Rfl:   •  omeprazole (PriLOSEC) 20 mg delayed release capsule, Take 1 capsule (20 mg total) by mouth daily, Disp: 30 capsule, Rfl: 0  •  ALPRAZolam (XANAX) 0 25 mg tablet, Take 0 25 mg by mouth every 24 hours (Patient not taking: Reported on 3/11/2022), Disp: , Rfl:   •  ascorbic acid (VITAMIN C) 500 mg tablet, Take 500 mg by mouth daily (Patient not taking: Reported on 3/11/2022), Disp: , Rfl:   •  cholecalciferol (VITAMIN D3) 25 mcg (1,000 units) tablet, , Disp: , Rfl:   •  CHOLECALCIFEROL PO, Take 2,000 Units by mouth (Patient not taking: Reported on 3/11/2022), Disp: , Rfl:   •  ferrous sulfate 325 (65 Fe) mg tablet, Take 325 mg by mouth daily with breakfast (Patient not taking: Reported on 3/11/2022), Disp: , Rfl:   •  Jessica 0 35 MG tablet, , Disp: , Rfl:   •  hydrocortisone 2 5 % cream, Apply 1 application topically 2 (two) times a day (Patient not taking: Reported on 6/22/2022), Disp: , Rfl:   •  hydrOXYzine HCL (ATARAX) 25 mg tablet, , Disp: , Rfl:   •  iron polysaccharides (FERREX) 150 mg capsule, Take 150 mg by mouth daily, Disp: , Rfl:   •  oxyCODONE (ROXICODONE) 5 immediate release tablet, Take 5 mg by mouth every 8 (eight) hours as needed (Patient not taking: Reported on 6/15/2022), Disp: , Rfl:   •  pramoxine-hydrocortisone cream, Apply topically 3 (three) times a day (Patient not taking: Reported on 6/22/2022), Disp: 57 g, Rfl: 0  •  sertraline (ZOLOFT) 50 mg tablet, , Disp: , Rfl: 3  •  triamcinolone (KENALOG) 0 1 % cream, Apply topically 3 (three) times a day (Patient not taking: Reported on 1/1/2022), Disp: 80 g, Rfl: 1    Current Allergies     Allergies as of 04/02/2023 - Reviewed 04/02/2023   Allergen Reaction Noted   • Metronidazole GI Intolerance 03/09/2018   • Azithromycin Palpitations 04/03/2017   • Tramadol Palpitations 04/04/2016          The following portions of the patient's history were reviewed and updated as appropriate: allergies, current medications, past family history, past medical history, past social history, past surgical history and problem list    Past Medical History:   Diagnosis Date   • Anxiety    • Depression      Past Surgical History: "  Procedure Laterality Date   •  SECTION     • DILATION AND CURETTAGE, DIAGNOSTIC / THERAPEUTIC       History reviewed  No pertinent family history  Social History     Socioeconomic History   • Marital status: Single     Spouse name: Not on file   • Number of children: Not on file   • Years of education: Not on file   • Highest education level: Not on file   Occupational History   • Not on file   Tobacco Use   • Smoking status: Every Day     Packs/day: 0 25     Years: 20 00     Pack years: 5 00     Types: Cigarettes   • Smokeless tobacco: Never   Vaping Use   • Vaping Use: Never used   Substance and Sexual Activity   • Alcohol use: Never   • Drug use: Not Currently     Types: Marijuana     Comment: Medical dennis   • Sexual activity: Not on file   Other Topics Concern   • Not on file   Social History Narrative   • Not on file     Social Determinants of Health     Financial Resource Strain: Not on file   Food Insecurity: Not on file   Transportation Needs: Not on file   Physical Activity: Not on file   Stress: Not on file   Social Connections: Not on file   Intimate Partner Violence: Not on file   Housing Stability: Not on file     Medications have been verified  Objective   /72   Pulse 98   Temp 97 9 °F (36 6 °C)   Resp 20   Ht 5' 4\" (1 626 m)   Wt 66 9 kg (147 lb 6 4 oz)   SpO2 100%   BMI 25 30 kg/m²      Physical Exam   Physical Exam  Vitals and nursing note reviewed  Constitutional:       General: She is not in acute distress  Appearance: Normal appearance  She is well-developed and normal weight  HENT:      Head: Normocephalic and atraumatic  Right Ear: Tympanic membrane normal       Left Ear: Tympanic membrane normal       Nose: Nose normal  No mucosal edema or rhinorrhea  Right Sinus: No maxillary sinus tenderness or frontal sinus tenderness  Left Sinus: No maxillary sinus tenderness or frontal sinus tenderness        Mouth/Throat:      Mouth: Mucous membranes " are moist       Pharynx: Oropharynx is clear  No oropharyngeal exudate or posterior oropharyngeal erythema  Eyes:      Extraocular Movements: Extraocular movements intact  Conjunctiva/sclera: Conjunctivae normal       Pupils: Pupils are equal, round, and reactive to light  Cardiovascular:      Rate and Rhythm: Normal rate and regular rhythm  Pulses: Normal pulses  Heart sounds: Normal heart sounds  No murmur heard  Pulmonary:      Effort: Pulmonary effort is normal       Breath sounds: Normal breath sounds  Abdominal:      General: Abdomen is flat  Bowel sounds are normal  There is no distension  Palpations: Abdomen is soft  There is no mass  Tenderness: There is no abdominal tenderness  There is no guarding or rebound  Neurological:      Mental Status: She is alert

## 2023-04-30 ENCOUNTER — OFFICE VISIT (OUTPATIENT)
Dept: URGENT CARE | Facility: CLINIC | Age: 38
End: 2023-04-30

## 2023-04-30 VITALS
HEIGHT: 64 IN | WEIGHT: 143 LBS | DIASTOLIC BLOOD PRESSURE: 64 MMHG | OXYGEN SATURATION: 99 % | RESPIRATION RATE: 18 BRPM | TEMPERATURE: 98 F | BODY MASS INDEX: 24.41 KG/M2 | SYSTOLIC BLOOD PRESSURE: 122 MMHG | HEART RATE: 110 BPM

## 2023-04-30 DIAGNOSIS — B34.9 ACUTE VIRAL SYNDROME: Primary | ICD-10-CM

## 2023-04-30 RX ORDER — BENZONATATE 200 MG/1
200 CAPSULE ORAL 3 TIMES DAILY PRN
Qty: 20 CAPSULE | Refills: 0 | Status: SHIPPED | OUTPATIENT
Start: 2023-04-30

## 2023-04-30 RX ORDER — LORATADINE 10 MG/1
10 TABLET ORAL DAILY
Qty: 30 TABLET | Refills: 0 | Status: SHIPPED | OUTPATIENT
Start: 2023-04-30

## 2023-04-30 RX ORDER — FLUTICASONE PROPIONATE 50 MCG
2 SPRAY, SUSPENSION (ML) NASAL DAILY
Qty: 1 G | Refills: 0 | Status: SHIPPED | OUTPATIENT
Start: 2023-04-30

## 2023-04-30 NOTE — PROGRESS NOTES
[facility]        NAME: Elo Ramirez is a 42 y o  female  : 1985    MRN: [id number]  DATE: 2023  TIME: 6:19 PM    Assessment and Plan   Acute viral syndrome [B34 9]  1  Acute viral syndrome  Covid/Flu-Office Collect    fluticasone (FLONASE) 50 mcg/act nasal spray    benzonatate (TESSALON) 200 MG capsule    loratadine (CLARITIN) 10 mg tablet            Patient Instructions       Follow up with PCP in 3-5 days  Proceed to  ER if symptoms worsen  Chief Complaint     Chief Complaint   Patient presents with    Sore Throat    Cold Like Symptoms     Sore throat, sinus congestion, runny nose, fatigue, body aches for 3-4 days         History of Present Illness       Patient is a 43 y/o/f presenting to [facility] with headache, chills, nasal congestion, rhinorrhea, sore throat and cough  Patient reports symptoms began 3-4 days ago  Patient reports body aches w/ onset of symptoms  No fever that pt is aware of  Pt coworker w/ similar illness  Cough  This is a new problem  The current episode started in the past 7 days  The problem has been gradually worsening  The problem occurs every few minutes  Associated symptoms include myalgias, postnasal drip, rhinorrhea and a sore throat  Pertinent negatives include no chest pain, chills, ear pain, fever, rash or shortness of breath  Review of Systems   Review of Systems   Constitutional: Negative for chills and fever  HENT: Positive for congestion, postnasal drip, rhinorrhea and sore throat  Negative for ear pain  Eyes: Negative for pain and visual disturbance  Respiratory: Positive for cough  Negative for shortness of breath  Cardiovascular: Negative for chest pain and palpitations  Gastrointestinal: Negative for abdominal pain and vomiting  Genitourinary: Negative for dysuria and hematuria  Musculoskeletal: Positive for myalgias  Negative for arthralgias and back pain  Skin: Negative for color change and rash  Neurological: Negative for seizures and syncope  All other systems reviewed and are negative          Current Medications       Current Outpatient Medications:     ALPRAZolam (XANAX) 0 5 mg tablet, , Disp: , Rfl:     benzonatate (TESSALON) 200 MG capsule, Take 1 capsule (200 mg total) by mouth 3 (three) times a day as needed for cough, Disp: 20 capsule, Rfl: 0    fluticasone (FLONASE) 50 mcg/act nasal spray, 2 sprays into each nostril daily, Disp: 1 g, Rfl: 0    loratadine (CLARITIN) 10 mg tablet, Take 1 tablet (10 mg total) by mouth daily, Disp: 30 tablet, Rfl: 0    Multiple Vitamin (MULTI-DAY PO), Take by mouth, Disp: , Rfl:     Omega-3 Fatty Acids (FISH OIL PO), Take 2 g by mouth, Disp: , Rfl:     ALPRAZolam (XANAX) 0 25 mg tablet, Take 0 25 mg by mouth every 24 hours (Patient not taking: Reported on 3/11/2022), Disp: , Rfl:     ascorbic acid (VITAMIN C) 500 mg tablet, Take 500 mg by mouth daily (Patient not taking: Reported on 3/11/2022), Disp: , Rfl:     cholecalciferol (VITAMIN D3) 25 mcg (1,000 units) tablet, , Disp: , Rfl:     CHOLECALCIFEROL PO, Take 2,000 Units by mouth (Patient not taking: Reported on 3/11/2022), Disp: , Rfl:     ferrous sulfate 325 (65 Fe) mg tablet, Take 325 mg by mouth daily with breakfast (Patient not taking: Reported on 3/11/2022), Disp: , Rfl:     Jessica 0 35 MG tablet, , Disp: , Rfl:     hydrocortisone 2 5 % cream, Apply 1 application topically 2 (two) times a day (Patient not taking: Reported on 6/22/2022), Disp: , Rfl:     hydrOXYzine HCL (ATARAX) 25 mg tablet, , Disp: , Rfl:     iron polysaccharides (FERREX) 150 mg capsule, Take 150 mg by mouth daily, Disp: , Rfl:     omeprazole (PriLOSEC) 20 mg delayed release capsule, Take 1 capsule (20 mg total) by mouth daily (Patient not taking: Reported on 4/30/2023), Disp: 30 capsule, Rfl: 0    oxyCODONE (ROXICODONE) 5 immediate release tablet, Take 5 mg by mouth every 8 (eight) hours as needed (Patient not taking: "Reported on 6/15/2022), Disp: , Rfl:     pramoxine-hydrocortisone cream, Apply topically 3 (three) times a day (Patient not taking: Reported on 2022), Disp: 57 g, Rfl: 0    sertraline (ZOLOFT) 50 mg tablet, , Disp: , Rfl: 3    triamcinolone (KENALOG) 0 1 % cream, Apply topically 3 (three) times a day (Patient not taking: Reported on 2022), Disp: 80 g, Rfl: 1    Current Allergies     Allergies as of 2023 - Reviewed 2023   Allergen Reaction Noted    Metronidazole GI Intolerance 2018    Azithromycin Palpitations 2017    Tramadol Palpitations 2016            The following portions of the patient's history were reviewed and updated as appropriate: allergies, current medications, past family history, past medical history, past social history, past surgical history and problem list      Past Medical History:   Diagnosis Date    Anxiety     Depression        Past Surgical History:   Procedure Laterality Date     SECTION      DILATION AND CURETTAGE, DIAGNOSTIC / THERAPEUTIC         No family history on file  Medications have been verified  Objective   /64   Pulse (!) 110   Temp 98 °F (36 7 °C) (Temporal)   Resp 18   Ht 5' 3 5\" (1 613 m)   Wt 64 9 kg (143 lb)   SpO2 99%   BMI 24 93 kg/m²   No LMP recorded  Physical Exam     Physical Exam  Constitutional:       Appearance: Normal appearance  HENT:      Head: Normocephalic and atraumatic  Nose: Congestion and rhinorrhea present  Mouth/Throat:      Mouth: Mucous membranes are moist    Eyes:      Extraocular Movements: Extraocular movements intact  Conjunctiva/sclera: Conjunctivae normal       Pupils: Pupils are equal, round, and reactive to light  Cardiovascular:      Rate and Rhythm: Normal rate  Pulmonary:      Effort: Pulmonary effort is normal    Musculoskeletal:         General: Normal range of motion  Cervical back: Normal range of motion and neck supple   " Skin:     General: Skin is warm and dry  Capillary Refill: Capillary refill takes less than 2 seconds  Neurological:      General: No focal deficit present  Mental Status: She is alert and oriented to person, place, and time     Psychiatric:         Mood and Affect: Mood normal          Behavior: Behavior normal

## 2023-04-30 NOTE — LETTER
April 30, 2023     Patient: Fani Marsh   YOB: 1985   Date of Visit: 4/30/2023       To Whom It May Concern: It is my medical opinion that Nuria Mitchell should not return to work until receipt of a negative Covid/Influenza test result  If you have any questions or concerns, please don't hesitate to call           Sincerely,        Carri Becker PA-C    CC: No Recipients

## 2023-05-01 LAB
FLUAV RNA RESP QL NAA+PROBE: NEGATIVE
FLUBV RNA RESP QL NAA+PROBE: NEGATIVE
SARS-COV-2 RNA RESP QL NAA+PROBE: NEGATIVE

## 2023-05-03 ENCOUNTER — OFFICE VISIT (OUTPATIENT)
Dept: URGENT CARE | Facility: CLINIC | Age: 38
End: 2023-05-03

## 2023-05-03 VITALS
WEIGHT: 143 LBS | TEMPERATURE: 98.1 F | RESPIRATION RATE: 18 BRPM | BODY MASS INDEX: 24.93 KG/M2 | HEART RATE: 106 BPM | OXYGEN SATURATION: 100 %

## 2023-05-03 DIAGNOSIS — J01.90 ACUTE SINUSITIS, RECURRENCE NOT SPECIFIED, UNSPECIFIED LOCATION: Primary | ICD-10-CM

## 2023-05-03 RX ORDER — PANTOPRAZOLE SODIUM 40 MG/1
TABLET, DELAYED RELEASE ORAL
COMMUNITY
Start: 2023-04-05

## 2023-05-03 RX ORDER — AMOXICILLIN 875 MG/1
875 TABLET, COATED ORAL 2 TIMES DAILY
Qty: 20 TABLET | Refills: 0 | Status: SHIPPED | OUTPATIENT
Start: 2023-05-03 | End: 2023-05-13

## 2023-05-03 NOTE — PROGRESS NOTES
[facility]    NAME: Zahira Candelario is a 42 y o  female  : 1985    MRN: [id number]  DATE: May 3, 2023  TIME: 8:46 AM    Assessment and Plan   Acute sinusitis, recurrence not specified, unspecified location [J01 90]  1  Acute sinusitis, recurrence not specified, unspecified location  amoxicillin (AMOXIL) 875 mg tablet          Patient Instructions     Patient Instructions   I have prescribed an antibiotic for the infection  Please take the antibiotic as prescribed and finish the entire prescription  I recommend that the patient takes an over the counter probiotic or eats yogurt with live cultures in it [place]) to keep good bacteria in the gut and help prevent diarrhea  Wash hands frequently to prevent the spread of infection  Can use over the counter cough and cold medications to help with symptoms  Ibuprofen and/or tylenol as needed for pain or fever  If not improving over the next 7-10 days, follow up with PCP  Chief Complaint     Chief Complaint   Patient presents with    Cold Like Symptoms     One week       History of Present Illness   41-year-old female here with complaint of sinus congestion, sinus pressure and pain  Had cold-like symptoms that started a week ago and has been getting progressively worse  Has thick colored mucus  Sore throat with postnasal drip  No fever  Patient works at a   Review of Systems   Review of Systems   Constitutional: Positive for fatigue  Negative for appetite change, chills and fever  HENT: Positive for congestion, postnasal drip, sinus pressure and sore throat  Negative for ear discharge, ear pain, facial swelling and sneezing  Respiratory: Positive for cough  Negative for shortness of breath and wheezing  Neurological: Positive for headaches         Current Medications     Current Outpatient Medications:     amoxicillin (AMOXIL) 875 mg tablet, Take 1 tablet (875 mg total) by mouth 2 (two) times a day for 10 days, Disp: 20 tablet, Rfl: 0    ALPRAZolam (XANAX) 0 25 mg tablet, Take 0 25 mg by mouth every 24 hours (Patient not taking: Reported on 3/11/2022), Disp: , Rfl:     ALPRAZolam (XANAX) 0 5 mg tablet, , Disp: , Rfl:     ascorbic acid (VITAMIN C) 500 mg tablet, Take 500 mg by mouth daily (Patient not taking: Reported on 3/11/2022), Disp: , Rfl:     benzonatate (TESSALON) 200 MG capsule, Take 1 capsule (200 mg total) by mouth 3 (three) times a day as needed for cough, Disp: 20 capsule, Rfl: 0    cholecalciferol (VITAMIN D3) 25 mcg (1,000 units) tablet, , Disp: , Rfl:     CHOLECALCIFEROL PO, Take 2,000 Units by mouth (Patient not taking: Reported on 3/11/2022), Disp: , Rfl:     ferrous sulfate 325 (65 Fe) mg tablet, Take 325 mg by mouth daily with breakfast (Patient not taking: Reported on 3/11/2022), Disp: , Rfl:     fluticasone (FLONASE) 50 mcg/act nasal spray, 2 sprays into each nostril daily, Disp: 1 g, Rfl: 0    Jessica 0 35 MG tablet, , Disp: , Rfl:     hydrocortisone 2 5 % cream, Apply 1 application topically 2 (two) times a day (Patient not taking: Reported on 6/22/2022), Disp: , Rfl:     hydrOXYzine HCL (ATARAX) 25 mg tablet, , Disp: , Rfl:     iron polysaccharides (FERREX) 150 mg capsule, Take 150 mg by mouth daily, Disp: , Rfl:     loratadine (CLARITIN) 10 mg tablet, Take 1 tablet (10 mg total) by mouth daily, Disp: 30 tablet, Rfl: 0    Multiple Vitamin (MULTI-DAY PO), Take by mouth, Disp: , Rfl:     Omega-3 Fatty Acids (FISH OIL PO), Take 2 g by mouth, Disp: , Rfl:     omeprazole (PriLOSEC) 20 mg delayed release capsule, Take 1 capsule (20 mg total) by mouth daily (Patient not taking: Reported on 4/30/2023), Disp: 30 capsule, Rfl: 0    oxyCODONE (ROXICODONE) 5 immediate release tablet, Take 5 mg by mouth every 8 (eight) hours as needed (Patient not taking: Reported on 6/15/2022), Disp: , Rfl:     pantoprazole (PROTONIX) 40 mg tablet, , Disp: , Rfl:     pramoxine-hydrocortisone cream, Apply topically 3 (three) times a day (Patient not taking: Reported on 2022), Disp: 57 g, Rfl: 0    sertraline (ZOLOFT) 50 mg tablet, , Disp: , Rfl: 3    triamcinolone (KENALOG) 0 1 % cream, Apply topically 3 (three) times a day (Patient not taking: Reported on 2022), Disp: 80 g, Rfl: 1    Current Allergies     Allergies as of 2023 - Reviewed 2023   Allergen Reaction Noted    Metronidazole GI Intolerance 2018    Azithromycin Palpitations 2017    Tramadol Palpitations 2016          The following portions of the patient's history were reviewed and updated as appropriate: allergies, current medications, past family history, past medical history, past social history, past surgical history and problem list    Past Medical History:   Diagnosis Date    Anxiety     Depression      Past Surgical History:   Procedure Laterality Date     SECTION      DILATION AND CURETTAGE, DIAGNOSTIC / THERAPEUTIC       History reviewed  No pertinent family history    Social History     Socioeconomic History    Marital status: Single     Spouse name: Not on file    Number of children: Not on file    Years of education: Not on file    Highest education level: Not on file   Occupational History    Not on file   Tobacco Use    Smoking status: Every Day     Packs/day: 0 25     Years: 20 00     Pack years: 5 00     Types: Cigarettes    Smokeless tobacco: Never   Vaping Use    Vaping Use: Never used   Substance and Sexual Activity    Alcohol use: Never    Drug use: Not Currently     Types: Marijuana     Comment: Medical maricait    Sexual activity: Not on file   Other Topics Concern    Not on file   Social History Narrative    Not on file     Social Determinants of Health     Financial Resource Strain: Not on file   Food Insecurity: Not on file   Transportation Needs: Not on file   Physical Activity: Not on file   Stress: Not on file   Social Connections: Not on file   Intimate Partner Violence: Not on file   Housing Stability: Not on file     Medications have been verified  Objective   Pulse (!) 106   Temp 98 1 °F (36 7 °C)   Resp 18   Wt 64 9 kg (143 lb)   SpO2 100%   BMI 24 93 kg/m²      Physical Exam   Physical Exam  Vitals and nursing note reviewed  Constitutional:       General: She is not in acute distress  Appearance: She is well-developed  HENT:      Head: Normocephalic and atraumatic  Right Ear: Tympanic membrane normal       Left Ear: Tympanic membrane normal       Nose: Congestion present  No mucosal edema or rhinorrhea  Right Sinus: Maxillary sinus tenderness present  No frontal sinus tenderness  Left Sinus: Maxillary sinus tenderness present  No frontal sinus tenderness  Mouth/Throat:      Pharynx: Posterior oropharyngeal erythema present  No oropharyngeal exudate  Comments: Postnasal drip in the posterior pharynx  Eyes:      Conjunctiva/sclera: Conjunctivae normal    Cardiovascular:      Rate and Rhythm: Normal rate and regular rhythm  Heart sounds: Normal heart sounds  No murmur heard

## 2023-05-03 NOTE — LETTER
May 3, 2023     Patient: Elo Ramirez   YOB: 1985   Date of Visit: 5/3/2023       To Whom It May Concern: It is my medical opinion that Ganesh Grant may return to work on 5/4/23  If you have any questions or concerns, please don't hesitate to call           Sincerely,        Mai Silva PA-C    CC: No Recipients

## 2023-05-05 ENCOUNTER — OFFICE VISIT (OUTPATIENT)
Dept: URGENT CARE | Facility: CLINIC | Age: 38
End: 2023-05-05

## 2023-05-05 VITALS — TEMPERATURE: 99 F | HEART RATE: 112 BPM | RESPIRATION RATE: 18 BRPM | OXYGEN SATURATION: 96 %

## 2023-05-05 DIAGNOSIS — J01.90 ACUTE NON-RECURRENT SINUSITIS, UNSPECIFIED LOCATION: Primary | ICD-10-CM

## 2023-05-05 NOTE — PATIENT INSTRUCTIONS
Continue the use of amoxicillin  Continue the use of Flonase  May start over the counter anti-histamine such as Zyrtec, Claritin, or Allegra  Vitamin D3 2000 IU daily  Vitamin C 1000mg twice per day  Multivitamin daily  Fluids and rest  Over the counter cold medication as needed (EX: Coricidin HBP, tylenol/motrin)  Follow up with PCP in 3-5 days  Proceed to ER if symptoms worsen  Eat yogurt with live and active cultures and/or take a probiotic at least 3 hours before or after antibiotic dose  Monitor stool for diarrhea and/or blood  If this occurs, contact primary care doctor ASAP

## 2023-05-05 NOTE — PROGRESS NOTES
[facility]        NAME: Trevor Lynn is a 42 y o  female  : 1985    MRN: [id number]  DATE: May 5, 2023  TIME: 1:47 PM    Assessment and Plan   Acute non-recurrent sinusitis, unspecified location [J01 90]  1  Acute non-recurrent sinusitis, unspecified location              Patient Instructions     Continue the use of amoxicillin  Continue the use of Flonase  May start over the counter anti-histamine such as Zyrtec, Claritin, or Allegra  Vitamin D3 2000 IU daily  Vitamin C 1000mg twice per day  Multivitamin daily  Fluids and rest  Over the counter cold medication as needed (EX: Coricidin HBP, tylenol/motrin)  Follow up with PCP in 3-5 days  Proceed to ER if symptoms worsen  Eat yogurt with live and active cultures and/or take a probiotic at least 3 hours before or after antibiotic dose  Monitor stool for diarrhea and/or blood  If this occurs, contact primary care doctor ASAP  Chief Complaint     Chief Complaint   Patient presents with    Sinusitis     Started last Thursday has been on meds          History of Present Illness       Patient is a 43 yo female with no significant PMH presenting in the clinic today for cold sx x 1 week  Patient was seen in the urgent care 2 days ago, diagnosed with sinusitis, and treated with amoxicillin  Denies symptom relief with 4 doses of amoxicillin  Admits congestion, rhinorrhea, headache, sinus pain/pressure, and postnasal drip  Denies fever, chills, cough, sore throat, chest pain, SOB, abdominal pain, n/v/d  Admits the use of amoxicillin for symptom management  Denies h/o seasonal allergies and asthma allergies  Admits smoking  Review of Systems   Review of Systems   Constitutional: Negative for chills, fatigue and fever  HENT: Positive for congestion, postnasal drip, rhinorrhea, sinus pressure and sinus pain  Negative for ear pain and sore throat  Respiratory: Negative for cough and shortness of breath      Cardiovascular: Negative for chest pain    Gastrointestinal: Negative for abdominal pain, diarrhea, nausea and vomiting  Musculoskeletal: Negative for myalgias  Skin: Negative for rash  Neurological: Positive for headaches           Current Medications       Current Outpatient Medications:     ALPRAZolam (XANAX) 0 25 mg tablet, Take 0 25 mg by mouth every 24 hours (Patient not taking: Reported on 3/11/2022), Disp: , Rfl:     ALPRAZolam (XANAX) 0 5 mg tablet, , Disp: , Rfl:     amoxicillin (AMOXIL) 875 mg tablet, Take 1 tablet (875 mg total) by mouth 2 (two) times a day for 10 days, Disp: 20 tablet, Rfl: 0    ascorbic acid (VITAMIN C) 500 mg tablet, Take 500 mg by mouth daily (Patient not taking: Reported on 3/11/2022), Disp: , Rfl:     benzonatate (TESSALON) 200 MG capsule, Take 1 capsule (200 mg total) by mouth 3 (three) times a day as needed for cough, Disp: 20 capsule, Rfl: 0    cholecalciferol (VITAMIN D3) 25 mcg (1,000 units) tablet, , Disp: , Rfl:     CHOLECALCIFEROL PO, Take 2,000 Units by mouth (Patient not taking: Reported on 3/11/2022), Disp: , Rfl:     ferrous sulfate 325 (65 Fe) mg tablet, Take 325 mg by mouth daily with breakfast (Patient not taking: Reported on 3/11/2022), Disp: , Rfl:     fluticasone (FLONASE) 50 mcg/act nasal spray, 2 sprays into each nostril daily, Disp: 1 g, Rfl: 0    Jessica 0 35 MG tablet, , Disp: , Rfl:     hydrocortisone 2 5 % cream, Apply 1 application topically 2 (two) times a day (Patient not taking: Reported on 6/22/2022), Disp: , Rfl:     hydrOXYzine HCL (ATARAX) 25 mg tablet, , Disp: , Rfl:     iron polysaccharides (FERREX) 150 mg capsule, Take 150 mg by mouth daily, Disp: , Rfl:     loratadine (CLARITIN) 10 mg tablet, Take 1 tablet (10 mg total) by mouth daily, Disp: 30 tablet, Rfl: 0    Multiple Vitamin (MULTI-DAY PO), Take by mouth, Disp: , Rfl:     Omega-3 Fatty Acids (FISH OIL PO), Take 2 g by mouth, Disp: , Rfl:     omeprazole (PriLOSEC) 20 mg delayed release capsule, Take 1 capsule (20 mg total) by mouth daily (Patient not taking: Reported on 2023), Disp: 30 capsule, Rfl: 0    oxyCODONE (ROXICODONE) 5 immediate release tablet, Take 5 mg by mouth every 8 (eight) hours as needed (Patient not taking: Reported on 6/15/2022), Disp: , Rfl:     pantoprazole (PROTONIX) 40 mg tablet, , Disp: , Rfl:     pramoxine-hydrocortisone cream, Apply topically 3 (three) times a day (Patient not taking: Reported on 2022), Disp: 57 g, Rfl: 0    sertraline (ZOLOFT) 50 mg tablet, , Disp: , Rfl: 3    triamcinolone (KENALOG) 0 1 % cream, Apply topically 3 (three) times a day (Patient not taking: Reported on 2022), Disp: 80 g, Rfl: 1    Current Allergies     Allergies as of 2023 - Reviewed 2023   Allergen Reaction Noted    Metronidazole GI Intolerance 2018    Azithromycin Palpitations 2017    Tramadol Palpitations 2016            The following portions of the patient's history were reviewed and updated as appropriate: allergies, current medications, past family history, past medical history, past social history, past surgical history and problem list      Past Medical History:   Diagnosis Date    Anxiety     Depression        Past Surgical History:   Procedure Laterality Date     SECTION      DILATION AND CURETTAGE, DIAGNOSTIC / THERAPEUTIC         No family history on file  Medications have been verified  Objective   Pulse (!) 112   Temp 99 °F (37 2 °C)   Resp 18   SpO2 96%        Physical Exam     Physical Exam  Vitals reviewed  Constitutional:       General: She is not in acute distress  Appearance: Normal appearance  She is normal weight  She is not ill-appearing  HENT:      Head: Normocephalic  Right Ear: Hearing, tympanic membrane, ear canal and external ear normal  No middle ear effusion  There is no impacted cerumen  Tympanic membrane is not erythematous or bulging        Left Ear: Hearing, tympanic membrane, ear canal and external ear normal   No middle ear effusion  There is no impacted cerumen  Tympanic membrane is not erythematous or bulging  Nose: Congestion present  No rhinorrhea  Right Sinus: Maxillary sinus tenderness present  No frontal sinus tenderness  Left Sinus: Maxillary sinus tenderness present  No frontal sinus tenderness  Mouth/Throat:      Lips: Pink  Mouth: Mucous membranes are moist       Pharynx: Oropharynx is clear  Uvula midline  No pharyngeal swelling, oropharyngeal exudate, posterior oropharyngeal erythema or uvula swelling  Tonsils: No tonsillar exudate or tonsillar abscesses  1+ on the right  1+ on the left  Comments: Postnasal drip present  Eyes:      Conjunctiva/sclera: Conjunctivae normal    Cardiovascular:      Rate and Rhythm: Normal rate and regular rhythm  Pulses: Normal pulses  Heart sounds: Normal heart sounds  No murmur heard  No friction rub  No gallop  Pulmonary:      Effort: Pulmonary effort is normal       Breath sounds: Normal breath sounds  No wheezing, rhonchi or rales  Abdominal:      General: Abdomen is flat  Musculoskeletal:      Cervical back: Normal range of motion and neck supple  No tenderness  Lymphadenopathy:      Cervical: No cervical adenopathy  Skin:     General: Skin is warm  Neurological:      Mental Status: She is alert     Psychiatric:         Mood and Affect: Mood normal          Behavior: Behavior normal

## 2023-07-03 ENCOUNTER — APPOINTMENT (EMERGENCY)
Dept: CT IMAGING | Facility: HOSPITAL | Age: 38
End: 2023-07-03
Payer: COMMERCIAL

## 2023-07-03 ENCOUNTER — HOSPITAL ENCOUNTER (EMERGENCY)
Facility: HOSPITAL | Age: 38
Discharge: HOME | End: 2023-07-03
Attending: EMERGENCY MEDICINE
Payer: COMMERCIAL

## 2023-07-03 ENCOUNTER — APPOINTMENT (EMERGENCY)
Dept: RADIOLOGY | Facility: HOSPITAL | Age: 38
End: 2023-07-03
Payer: COMMERCIAL

## 2023-07-03 VITALS
BODY MASS INDEX: 24.41 KG/M2 | OXYGEN SATURATION: 99 % | RESPIRATION RATE: 20 BRPM | SYSTOLIC BLOOD PRESSURE: 180 MMHG | HEIGHT: 64 IN | TEMPERATURE: 97.7 F | HEART RATE: 117 BPM | DIASTOLIC BLOOD PRESSURE: 106 MMHG | WEIGHT: 143 LBS

## 2023-07-03 DIAGNOSIS — F41.9 ANXIETY: Primary | ICD-10-CM

## 2023-07-03 LAB
ALBUMIN SERPL BCP-MCNC: 4.6 G/DL (ref 3.5–5)
ALP SERPL-CCNC: 85 U/L (ref 34–104)
ALT SERPL W P-5'-P-CCNC: 45 U/L (ref 7–52)
ANION GAP SERPL CALCULATED.3IONS-SCNC: 9 MMOL/L
AST SERPL W P-5'-P-CCNC: 21 U/L (ref 13–39)
BASOPHILS # BLD AUTO: 0.08 THOUSANDS/ÂΜL (ref 0–0.1)
BASOPHILS NFR BLD AUTO: 1 % (ref 0–1)
BILIRUB SERPL-MCNC: 0.36 MG/DL (ref 0.2–1)
BUN SERPL-MCNC: 10 MG/DL (ref 5–25)
CALCIUM SERPL-MCNC: 9.6 MG/DL (ref 8.4–10.2)
CARDIAC TROPONIN I PNL SERPL HS: <2 NG/L
CHLORIDE SERPL-SCNC: 102 MMOL/L (ref 96–108)
CO2 SERPL-SCNC: 24 MMOL/L (ref 21–32)
CREAT SERPL-MCNC: 0.66 MG/DL (ref 0.6–1.3)
D DIMER PPP FEU-MCNC: 1.04 UG/ML FEU
EOSINOPHIL # BLD AUTO: 0.19 THOUSAND/ÂΜL (ref 0–0.61)
EOSINOPHIL NFR BLD AUTO: 1 % (ref 0–6)
ERYTHROCYTE [DISTWIDTH] IN BLOOD BY AUTOMATED COUNT: 15.1 % (ref 11.6–15.1)
EXT PREGNANCY TEST URINE: NEGATIVE
EXT. CONTROL: NORMAL
GFR SERPL CREATININE-BSD FRML MDRD: 111 ML/MIN/1.73SQ M
GLUCOSE SERPL-MCNC: 108 MG/DL (ref 65–140)
HCT VFR BLD AUTO: 41 % (ref 34.8–46.1)
HGB BLD-MCNC: 13 G/DL (ref 11.5–15.4)
IMM GRANULOCYTES # BLD AUTO: 0.09 THOUSAND/UL (ref 0–0.2)
IMM GRANULOCYTES NFR BLD AUTO: 1 % (ref 0–2)
LYMPHOCYTES # BLD AUTO: 1.66 THOUSANDS/ÂΜL (ref 0.6–4.47)
LYMPHOCYTES NFR BLD AUTO: 12 % (ref 14–44)
MCH RBC QN AUTO: 24.6 PG (ref 26.8–34.3)
MCHC RBC AUTO-ENTMCNC: 31.7 G/DL (ref 31.4–37.4)
MCV RBC AUTO: 78 FL (ref 82–98)
MONOCYTES # BLD AUTO: 0.84 THOUSAND/ÂΜL (ref 0.17–1.22)
MONOCYTES NFR BLD AUTO: 6 % (ref 4–12)
NEUTROPHILS # BLD AUTO: 11.15 THOUSANDS/ÂΜL (ref 1.85–7.62)
NEUTS SEG NFR BLD AUTO: 79 % (ref 43–75)
NRBC BLD AUTO-RTO: 0 /100 WBCS
PLATELET # BLD AUTO: 387 THOUSANDS/UL (ref 149–390)
PMV BLD AUTO: 9.2 FL (ref 8.9–12.7)
POTASSIUM SERPL-SCNC: 3.7 MMOL/L (ref 3.5–5.3)
PROT SERPL-MCNC: 7.8 G/DL (ref 6.4–8.4)
RBC # BLD AUTO: 5.29 MILLION/UL (ref 3.81–5.12)
SODIUM SERPL-SCNC: 135 MMOL/L (ref 135–147)
WBC # BLD AUTO: 14.01 THOUSAND/UL (ref 4.31–10.16)

## 2023-07-03 PROCEDURE — 71275 CT ANGIOGRAPHY CHEST: CPT

## 2023-07-03 PROCEDURE — 80053 COMPREHEN METABOLIC PANEL: CPT | Performed by: EMERGENCY MEDICINE

## 2023-07-03 PROCEDURE — 36415 COLL VENOUS BLD VENIPUNCTURE: CPT | Performed by: EMERGENCY MEDICINE

## 2023-07-03 PROCEDURE — 85025 COMPLETE CBC W/AUTO DIFF WBC: CPT | Performed by: EMERGENCY MEDICINE

## 2023-07-03 PROCEDURE — 99284 EMERGENCY DEPT VISIT MOD MDM: CPT

## 2023-07-03 PROCEDURE — 71045 X-RAY EXAM CHEST 1 VIEW: CPT

## 2023-07-03 PROCEDURE — 93005 ELECTROCARDIOGRAM TRACING: CPT

## 2023-07-03 PROCEDURE — 85379 FIBRIN DEGRADATION QUANT: CPT | Performed by: EMERGENCY MEDICINE

## 2023-07-03 PROCEDURE — 84484 ASSAY OF TROPONIN QUANT: CPT | Performed by: EMERGENCY MEDICINE

## 2023-07-03 PROCEDURE — 81025 URINE PREGNANCY TEST: CPT | Performed by: EMERGENCY MEDICINE

## 2023-07-03 RX ORDER — LORAZEPAM 1 MG/1
1 TABLET ORAL ONCE
Status: COMPLETED | OUTPATIENT
Start: 2023-07-03 | End: 2023-07-03

## 2023-07-03 RX ORDER — LORAZEPAM 1 MG/1
1 TABLET ORAL EVERY 6 HOURS PRN
Qty: 20 TABLET | Refills: 0 | Status: SHIPPED | OUTPATIENT
Start: 2023-07-03

## 2023-07-03 RX ADMIN — LORAZEPAM 1 MG: 1 TABLET ORAL at 14:23

## 2023-07-03 RX ADMIN — IOHEXOL 85 ML: 350 INJECTION, SOLUTION INTRAVENOUS at 15:36

## 2023-07-03 RX ADMIN — LORAZEPAM 1 MG: 1 TABLET ORAL at 15:22

## 2023-07-03 NOTE — ED PROVIDER NOTES
History  Chief Complaint   Patient presents with   • Panic Attack     Patient reports she had a panic attack today. States that she takes xanax for years but it is no longer working     Patient is a 58-year-old female. She does have a history of anxiety and depression. Does not have a history of thromboembolic disease. No history of coronary artery disease. No history of hypertension. About a week ago, patient had her gallbladder out. Since then she really has not felt well. She has been having a lot of anxiety and neck attacks. No relief with Xanax. On the way here, she did experience some chest pain and shortness of breath which she attributed to a panic attack. She has a blood pressure cuff at home. She has been taking her blood pressure. It has been markedly elevated. She has also been tachycardic. He is really concerned about how high her blood pressure is. Currently she does not have chest pain or trouble breathing. No severe headache. No lateralizing motor or sensory complaints. The abdominal pain from the surgery is much improved. She has not had any fever or chills. Prior to Admission Medications   Prescriptions Last Dose Informant Patient Reported? Taking?    ALPRAZolam (XANAX) 0.25 mg tablet  Self Yes No   Sig: Take 0.25 mg by mouth every 24 hours   Patient not taking: Reported on 3/11/2022   ALPRAZolam (XANAX) 0.5 mg tablet   Yes No   CHOLECALCIFEROL PO   Yes No   Sig: Take 2,000 Units by mouth   Patient not taking: Reported on 3/11/2022   Jessica 0.35 MG tablet  Self Yes No   Patient not taking: Reported on 6/22/2022   Multiple Vitamin (MULTI-DAY PO)  Self Yes No   Sig: Take by mouth   Omega-3 Fatty Acids (FISH OIL PO)   Yes No   Sig: Take 2 g by mouth   ascorbic acid (VITAMIN C) 500 mg tablet  Self Yes No   Sig: Take 500 mg by mouth daily   Patient not taking: Reported on 3/11/2022   benzonatate (TESSALON) 200 MG capsule   No No   Sig: Take 1 capsule (200 mg total) by mouth 3 (three) times a day as needed for cough   cholecalciferol (VITAMIN D3) 25 mcg (1,000 units) tablet  Self Yes No   Patient not taking: Reported on 2023   ferrous sulfate 325 (65 Fe) mg tablet  Self Yes No   Sig: Take 325 mg by mouth daily with breakfast   Patient not taking: Reported on 3/11/2022   fluticasone (FLONASE) 50 mcg/act nasal spray   No No   Si sprays into each nostril daily   hydrOXYzine HCL (ATARAX) 25 mg tablet   Yes No   Patient not taking: Reported on 6/15/2022   hydrocortisone 2.5 % cream   Yes No   Sig: Apply 1 application topically 2 (two) times a day   Patient not taking: Reported on 2022   iron polysaccharides (FERREX) 150 mg capsule  Self Yes No   Sig: Take 150 mg by mouth daily   loratadine (CLARITIN) 10 mg tablet   No No   Sig: Take 1 tablet (10 mg total) by mouth daily   omeprazole (PriLOSEC) 20 mg delayed release capsule   No No   Sig: Take 1 capsule (20 mg total) by mouth daily   Patient not taking: Reported on 2023   oxyCODONE (ROXICODONE) 5 immediate release tablet   Yes No   Sig: Take 5 mg by mouth every 8 (eight) hours as needed   Patient not taking: Reported on 6/15/2022   pantoprazole (PROTONIX) 40 mg tablet   Yes No   pramoxine-hydrocortisone cream   No No   Sig: Apply topically 3 (three) times a day   Patient not taking: Reported on 2022   sertraline (ZOLOFT) 50 mg tablet   Yes No   Patient not taking: Reported on 3/11/2022   triamcinolone (KENALOG) 0.1 % cream   No No   Sig: Apply topically 3 (three) times a day   Patient not taking: Reported on 2022      Facility-Administered Medications: None       Past Medical History:   Diagnosis Date   • Anxiety    • Depression        Past Surgical History:   Procedure Laterality Date   •  SECTION     • DILATION AND CURETTAGE, DIAGNOSTIC / THERAPEUTIC         History reviewed. No pertinent family history. I have reviewed and agree with the history as documented.     E-Cigarette/Vaping   • E-Cigarette Use Never User      E-Cigarette/Vaping Substances     Social History     Tobacco Use   • Smoking status: Every Day     Packs/day: 0.25     Years: 20.00     Total pack years: 5.00     Types: Cigarettes   • Smokeless tobacco: Never   Vaping Use   • Vaping Use: Never used   Substance Use Topics   • Alcohol use: Never   • Drug use: Not Currently     Types: Marijuana     Comment: Medical marijana       Review of Systems   Constitutional: Negative for chills and fever. HENT: Negative for rhinorrhea and sore throat. Eyes: Negative for pain, redness and visual disturbance. Respiratory: Positive for shortness of breath. Negative for cough. Cardiovascular: Positive for chest pain. Negative for leg swelling. Gastrointestinal: Negative for abdominal pain, diarrhea and vomiting. Endocrine: Negative for polydipsia and polyuria. Genitourinary: Negative for dysuria, frequency, hematuria, vaginal bleeding and vaginal discharge. Musculoskeletal: Negative for back pain and neck pain. Skin: Negative for rash and wound. Allergic/Immunologic: Negative for immunocompromised state. Neurological: Negative for weakness, numbness and headaches. Hematological: Does not bruise/bleed easily. Psychiatric/Behavioral: Negative for hallucinations and suicidal ideas. The patient is nervous/anxious. All other systems reviewed and are negative. Physical Exam  Physical Exam  Vitals reviewed. Constitutional:       General: She is not in acute distress. HENT:      Head: Normocephalic and atraumatic. Nose: Nose normal.      Mouth/Throat:      Mouth: Mucous membranes are moist.   Eyes:      General:         Right eye: No discharge. Left eye: No discharge. Conjunctiva/sclera: Conjunctivae normal.   Cardiovascular:      Rate and Rhythm: Regular rhythm. Tachycardia present. Pulses: Normal pulses. Heart sounds: Normal heart sounds. No murmur heard. No friction rub. No gallop.    Pulmonary: Effort: Pulmonary effort is normal. No respiratory distress. Breath sounds: Normal breath sounds. No stridor. No wheezing, rhonchi or rales. Abdominal:      General: Bowel sounds are normal. There is no distension. Palpations: Abdomen is soft. Tenderness: There is no abdominal tenderness. There is no right CVA tenderness, left CVA tenderness, guarding or rebound. Comments: Surgical incisions look good. Well approximated. No erythema, swelling, drainage, or fluctuance. Musculoskeletal:         General: No swelling, tenderness, deformity or signs of injury. Normal range of motion. Cervical back: Normal range of motion and neck supple. No rigidity. Right lower leg: No edema. Left lower leg: No edema. Comments: No calf tenderness or unilateral leg swelling. Skin:     General: Skin is warm and dry. Coloration: Skin is not jaundiced. Findings: No rash. Neurological:      General: No focal deficit present. Mental Status: She is alert and oriented to person, place, and time. Sensory: No sensory deficit. Motor: Motor function is intact.    Psychiatric:         Mood and Affect: Mood normal.         Behavior: Behavior normal.         Vital Signs  ED Triage Vitals [07/03/23 1350]   Temperature Pulse Respirations Blood Pressure SpO2   97.7 °F (36.5 °C) (!) 117 20 (!) 180/106 99 %      Temp Source Heart Rate Source Patient Position - Orthostatic VS BP Location FiO2 (%)   Temporal Monitor Lying Left arm --      Pain Score       No Pain           Vitals:    07/03/23 1350   BP: (!) 180/106   Pulse: (!) 117   Patient Position - Orthostatic VS: Lying         Visual Acuity      ED Medications  Medications   sodium chloride 0.9 % bolus 1,000 mL (1,000 mL Intravenous Not Given 7/3/23 1416)   LORazepam (ATIVAN) tablet 1 mg (1 mg Oral Given 7/3/23 1423)   LORazepam (ATIVAN) tablet 1 mg (1 mg Oral Given 7/3/23 1522)   iohexol (OMNIPAQUE) 350 MG/ML injection (SINGLE-DOSE) 85 mL (85 mL Intravenous Given 7/3/23 1536)       Diagnostic Studies  Results Reviewed     Procedure Component Value Units Date/Time    HS Troponin I 4hr [[id number]]     Lab Status: No result Specimen: Blood     HS Troponin 0hr (reflex protocol) [[id number]]  (Normal) Collected: 07/03/23 1436    Lab Status: Final result Specimen: Blood from Arm, Right Updated: 07/03/23 1509     hs TnI 0hr <2 ng/L     HS Troponin I 2hr [[id number]]     Lab Status: No result Specimen: Blood     D-Dimer [[id number]]  (Abnormal) Collected: 07/03/23 1436    Lab Status: Final result Specimen: Blood from Arm, Right Updated: 07/03/23 1507     D-Dimer, Quant 1.04 ug/ml FEU     Comprehensive metabolic panel [[id number]] Collected: 07/03/23 1436    Lab Status: Final result Specimen: Blood from Arm, Right Updated: 07/03/23 1502     Sodium 135 mmol/L      Potassium 3.7 mmol/L      Chloride 102 mmol/L      CO2 24 mmol/L      ANION GAP 9 mmol/L      BUN 10 mg/dL      Creatinine 0.66 mg/dL      Glucose 108 mg/dL      Calcium 9.6 mg/dL      AST 21 U/L      ALT 45 U/L      Alkaline Phosphatase 85 U/L      Total Protein 7.8 g/dL      Albumin 4.6 g/dL      Total Bilirubin 0.36 mg/dL      eGFR 111 ml/min/1.73sq m     Narrative:      [facility] guidelines for Chronic Kidney Disease (CKD):   •  Stage 1 with normal or high GFR (GFR > 90 mL/min/1.73 square meters)  •  Stage 2 Mild CKD (GFR = 60-89 mL/min/1.73 square meters)  •  Stage 3A Moderate CKD (GFR = 45-59 mL/min/1.73 square meters)  •  Stage 3B Moderate CKD (GFR = 30-44 mL/min/1.73 square meters)  •  Stage 4 Severe CKD (GFR = 15-29 mL/min/1.73 square meters)  •  Stage 5 End Stage CKD (GFR <15 mL/min/1.73 square meters)  Note: GFR calculation is accurate only with a steady state creatinine    CBC and differential [[id number]]  (Abnormal) Collected: 07/03/23 1436    Lab Status: Final result Specimen: Blood from Arm, Right Updated: 07/03/23 1446     WBC 14.01 Thousand/uL RBC 5.29 Million/uL      Hemoglobin 13.0 g/dL      Hematocrit 41.0 %      MCV 78 fL      MCH 24.6 pg      MCHC 31.7 g/dL      RDW 15.1 %      MPV 9.2 fL      Platelets 174 Thousands/uL      nRBC 0 /100 WBCs      Neutrophils Relative 79 %      Immat GRANS % 1 %      Lymphocytes Relative 12 %      Monocytes Relative 6 %      Eosinophils Relative 1 %      Basophils Relative 1 %      Neutrophils Absolute 11.15 Thousands/µL      Immature Grans Absolute 0.09 Thousand/uL      Lymphocytes Absolute 1.66 Thousands/µL      Monocytes Absolute 0.84 Thousand/µL      Eosinophils Absolute 0.19 Thousand/µL      Basophils Absolute 0.08 Thousands/µL     POCT pregnancy, urine [[id number]]  (Normal) Resulted: 07/03/23 1444    Lab Status: Final result Updated: 07/03/23 1444     EXT Preg Test, Ur Negative     Control Valid                 CTA ED chest PE study   Final Result by Earle Moraes MD (07/03 1551)      No pulmonary embolus. Lungs clear. Workstation performed: [id number]         XR chest 1 view portable   ED Interpretation by Zuhair Short MD (07/03 7875)   No infiltrates. No CHF. Procedures  ECG 12 Lead Documentation Only    Date/Time: 7/3/2023 2:14 PM    Performed by: Zuhair Short MD  Authorized by: Zuhair Short MD    ECG reviewed by me, the ED Provider: yes    Patient location:  ED  Interpretation:     Interpretation: normal    Rate:     ECG rate assessment: normal    Rhythm:     Rhythm: sinus rhythm    Ectopy:     Ectopy: none    QRS:     QRS axis:  Normal  Conduction:     Conduction: normal    ST segments:     ST segments:  Normal  T waves:     T waves: normal               ED Course                               SBIRT 20yo+    Flowsheet Row Most Recent Value   Initial Alcohol Screen: US AUDIT-C     1. How often do you have a drink containing alcohol? 0 Filed at: 07/03/2023 1353   2.  How many drinks containing alcohol do you have on a typical day you are drinking?  0 Filed at: 07/03/2023 1351   3a. Male UNDER 65: How often do you have five or more drinks on one occasion? 0 Filed at: 07/03/2023 1351   3b. FEMALE Any Age, or MALE 65+: How often do you have 4 or more drinks on one occassion? 0 Filed at: 07/03/2023 1351   Audit-C Score 0 Filed at: 07/03/2023 1351   EDILSON: How many times in the past year have you. .. Used an illegal drug or used a prescription medication for non-medical reasons? Never Filed at: 07/03/2023 1351                    Medical Decision Making  I suspect this is all anxiety. The anxiety explains the hypertension and the tachycardia. Patient was afebrile. The tachycardia is not due to septicemia. Patient did not appear dehydrated. He has a benign abdominal exam.  As she did have chest pain, shortness of breath, and was recently postop, I did run a D-dimer. It was elevated. The CTA of the chest, however, was normal.  This is not pulmonary embolism. There is no pneumonia. There was no evidence of postop infection. There was no anemia. No hypoglycemia. Patient did feel better after ED treatment. Vital signs did normalize. Patient is appropriate for discharge and outpatient management. Amount and/or Complexity of Data Reviewed  Labs: ordered. Decision-making details documented in ED Course. Radiology: ordered and independent interpretation performed. Decision-making details documented in ED Course. Details: There was a mild elevation in white blood cell count. This is nonspecific. I do not believe this represents acute infection. Shunt has a benign abdomen. There is no fever. ECG/medicine tests: ordered and independent interpretation performed. Decision-making details documented in ED Course. Risk  Prescription drug management. Decision regarding hospitalization.           Disposition  Final diagnoses:   Anxiety     Time reflects when diagnosis was documented in both MDM as applicable and the Disposition within this note     Time User Action Codes Description Comment    7/3/2023  4:20 PM Zuhair Short Add [F41.9] Anxiety       ED Disposition     ED Disposition   Discharge    Condition   Stable    Date/Time   Mon Jul 3, 2023  4:20 PM    Comment   Clare Kimbrough discharge to home/self care. Follow-up Information     Follow up With Specialties Details Why Contact Info Additional Information    [facility] In 1 week  [address] [facility] [address] [phone] [facility], [address] [place], [place], [address] [phone]          Patient's Medications   Discharge Prescriptions    LORAZEPAM (ATIVAN) 1 MG TABLET    Take 1 tablet (1 mg total) by mouth every 6 (six) hours as needed for anxiety       Start Date: 7/3/2023  End Date: --       Order Dose: 1 mg       Quantity: 20 tablet    Refills: 0       No discharge procedures on file.     PDMP Review     None          ED Provider  Electronically Signed by           Zuhair Short MD  07/03/23 7292

## 2023-07-05 LAB
ATRIAL RATE: 88 BPM
P AXIS: 74 DEGREES
PR INTERVAL: 138 MS
QRS AXIS: 60 DEGREES
QRSD INTERVAL: 90 MS
QT INTERVAL: 352 MS
QTC INTERVAL: 425 MS
T WAVE AXIS: 32 DEGREES
VENTRICULAR RATE: 88 BPM

## 2023-07-05 PROCEDURE — 93010 ELECTROCARDIOGRAM REPORT: CPT | Performed by: INTERNAL MEDICINE

## 2023-07-11 ENCOUNTER — APPOINTMENT (OUTPATIENT)
Age: 38
End: 2023-07-11
Payer: COMMERCIAL

## 2023-07-11 DIAGNOSIS — Z13.29 SCREENING FOR THYROID DISORDER: ICD-10-CM

## 2023-07-11 LAB — TSH SERPL DL<=0.05 MIU/L-ACNC: 0.86 UIU/ML (ref 0.45–4.5)

## 2023-07-11 PROCEDURE — 84443 ASSAY THYROID STIM HORMONE: CPT

## 2023-07-11 PROCEDURE — 36415 COLL VENOUS BLD VENIPUNCTURE: CPT

## 2023-07-14 ENCOUNTER — OFFICE VISIT (OUTPATIENT)
Dept: URGENT CARE | Facility: CLINIC | Age: 38
End: 2023-07-14
Payer: COMMERCIAL

## 2023-07-14 ENCOUNTER — SOCIAL WORK (OUTPATIENT)
Dept: BEHAVIORAL/MENTAL HEALTH CLINIC | Facility: CLINIC | Age: 38
End: 2023-07-14
Payer: COMMERCIAL

## 2023-07-14 ENCOUNTER — DOCUMENTATION (OUTPATIENT)
Dept: BEHAVIORAL/MENTAL HEALTH CLINIC | Facility: CLINIC | Age: 38
End: 2023-07-14

## 2023-07-14 VITALS
OXYGEN SATURATION: 97 % | SYSTOLIC BLOOD PRESSURE: 152 MMHG | HEART RATE: 96 BPM | RESPIRATION RATE: 20 BRPM | DIASTOLIC BLOOD PRESSURE: 94 MMHG | BODY MASS INDEX: 23.97 KG/M2 | TEMPERATURE: 99.1 F | WEIGHT: 140.4 LBS | HEIGHT: 64 IN

## 2023-07-14 DIAGNOSIS — F41.9 ANXIETY: Primary | ICD-10-CM

## 2023-07-14 DIAGNOSIS — F41.9 ANXIETY: ICD-10-CM

## 2023-07-14 DIAGNOSIS — R39.9 UTI SYMPTOMS: Primary | ICD-10-CM

## 2023-07-14 LAB
SL AMB  POCT GLUCOSE, UA: ABNORMAL
SL AMB LEUKOCYTE ESTERASE,UA: ABNORMAL
SL AMB POCT BILIRUBIN,UA: ABNORMAL
SL AMB POCT BLOOD,UA: ABNORMAL
SL AMB POCT CLARITY,UA: ABNORMAL
SL AMB POCT COLOR,UA: ABNORMAL
SL AMB POCT KETONES,UA: ABNORMAL
SL AMB POCT NITRITE,UA: ABNORMAL
SL AMB POCT PH,UA: 5
SL AMB POCT SPECIFIC GRAVITY,UA: 1
SL AMB POCT URINE PROTEIN: ABNORMAL
SL AMB POCT UROBILINOGEN: 0.2

## 2023-07-14 PROCEDURE — 81002 URINALYSIS NONAUTO W/O SCOPE: CPT | Performed by: PHYSICIAN ASSISTANT

## 2023-07-14 PROCEDURE — H2021 COM WRAP-AROUND SV, 15 MIN: HCPCS | Performed by: PSYCHOLOGIST

## 2023-07-14 PROCEDURE — 99213 OFFICE O/P EST LOW 20 MIN: CPT | Performed by: PHYSICIAN ASSISTANT

## 2023-07-14 RX ORDER — IBUPROFEN 800 MG/1
TABLET ORAL
COMMUNITY
Start: 2023-06-26

## 2023-07-14 RX ORDER — CLONAZEPAM 0.5 MG/1
TABLET ORAL
COMMUNITY
Start: 2023-07-12

## 2023-07-14 RX ORDER — TRAZODONE HYDROCHLORIDE 50 MG/1
TABLET ORAL
COMMUNITY
Start: 2023-07-11

## 2023-07-14 NOTE — PROGRESS NOTES
Assessment      Crisis Intake  Patient Intake  Living Arrangement: House  Can patient return home?: Yes  Access to Firearms: No  Type of Work: [employer]ap[employer]  Work History: Full-time  Patient History  Presenting Problems: Pt stated that she is experience severe anxiety and has been unable to sleep for the past two weeks. Pt recently underwent gallbladder surgery and says her medications that she has been taking for several years were no longer working. Pt received different medications from PCP that also did not help and ended up going to the ED for continued panic attacks. Patient reported that she is having trouble caring for herself and three children at this time. She is concerned about her physical health and what would happen to her family if she was not around. Pt was very lethargic and tearful throughout the conversations. Pt reported that she has not received any type of BH treatment except for one therapy session she received through [facility] the previous week. No SI, HI, Ah nor VH past or presents. Pt reports stressors with current and previous  at this time. No other stressors reported. Treatment History: No BH treatment reported other then one therapy session with [facility] last week  Currently in Treatment: No  Medical Problems: Recently had gallbladder removed no other concerns reported  Legal Issues: none reported  Substance Abuse: No  Mental Status Exam  Orientation Level: Appropriate for age, Oriented X4  Affect: Blunted  Speech: Logical/coherent, Slow  Mood: Anxious, Depressed  Thought Content: Appropriate  Hallucination Type: No problems reported or observed.   Judgement: Fair  Impulse Control: Good  Attention Span: Appropriate for age  Memory: Intact  Appetite: Good  Sleep: Poor  Total Hours of Sleep: reported no sleep over the last two weeks  Specific Sleep Problem: unable to sleep  Appear/Hygiene: Neatly Groomed  ADL Comments: no concerns  Strengths and Limitations  Patient Strengths: Motivated, Financially secure, Cooperative, Reasoning ability, Well educated, Self reliant, Family ties, Negotiates basic needs  Patient Limitations: Poor past treatment response, Poor physical health, Difficulty adapting  Ideations  Current Self Harm/Suicidal Ideation: No  Previous Self Harm/Suicidal Ideation: No  Current homicidal or violent thoughts toward another: Denies ideations within past 6 months  Previous Plans to Harm Another Person: No  Previous History of Violence to Others: No  Provisional Diagnosis  Axis I: Anxiety    C-SSRS         Patient was referred by: Self or Family    Visit start and stop times:    07/14/23  Start Time: 65        Discharge and Safety    This writer discussed the patients current presentation and recommended discharge plan with Lorelei Gutiérrez Dr.  They agree with the patient being discharged at this time with referrals and/or information about medication and therapeutic supports. The patient was Instructed to follow up with their PCP and [facility]. The patient was provided with referral information for: Partial program and therapy services. This writer and the patient completed a safety plan.  The patient was provided with a copy of their safety plan with encouragement to utilize the plan following discharge. In addition, the patient was instructed to call local [facility] crisis, other crisis services, 911 or to go to the nearest ER immediately if their situation changes at any time.      This writer discussed discharge plans with the patient who agrees with and understands the discharge plans.     SAFETY PLAN  Warning Signs (thoughts, images, mood, behavior, situations) of a potential crisis: Increase anxiety, continued lack of sleep, forgetting or ignoring daily ADLs, any feeling of self harm      Coping Skills (what can I do to take my mind off the problem, or to keep myself safe): Exercise(walks/hiking) time with family, reading      Outside Support (who can I reach out to for support and help): Friends and family        National Suicide Prevention Hotline:  [address] [place] [address]: [address]: [address] [phone] - [address] [facility] [address] [facility] [address] [facility] [phone] - Crisis   [phone] - Peer [address] (1-9pm daily)  [phone] - Teen Support Talk Line (1-9pm daily)  [address] [place] [address] [address] [facility] [address] [facility]) [phone] - [address] [facility]

## 2023-07-14 NOTE — PROGRESS NOTES
[facility] Now    NAME: Venu Coppola is a 34 y.o. female  : 1985    MRN: [id number]  DATE: 2023  TIME: 6:27 PM    Assessment and Plan   UTI symptoms [R39.9]  1. UTI symptoms  POCT urine dip      2. Anxiety            Patient Instructions     Patient Instructions   No signs of uti in urine. Chief Complaint     Chief Complaint   Patient presents with   • Possible UTI     C/o urgency, low grade fever since for 2 days. Feeling generally unwell. History of Present Illness   34year old female here with complaint of urinary frequency but states that she does urinate a lot. Low grade fever on and off the past couple days. S/p cholecystectomy. States that since she has had her gallbladder out her anxiety is very high. Used to be managed with occasional xanax. Now prescribed Klonopin and it does not seem to be helping. States that she has not been sleeping. States that she has been jumping around to new PCPs because her doctor is away. She is not comfortable with this. She is afraid that she is going to die. Denies SI or HI. Review of Systems   Review of Systems   Constitutional: Negative for activity change, appetite change, chills, fatigue and fever. Respiratory: Negative for cough. Cardiovascular: Negative for chest pain. Gastrointestinal: Negative for abdominal pain, constipation, diarrhea, nausea and vomiting. Genitourinary: Positive for frequency. Negative for difficulty urinating, dysuria, flank pain, hematuria and urgency. Musculoskeletal: Negative for back pain and myalgias. All other systems reviewed and are negative.       Current Medications     Current Outpatient Medications:   •  ALPRAZolam (XANAX) 0.25 mg tablet, Take 0.25 mg by mouth every 24 hours, Disp: , Rfl:   •  ALPRAZolam (XANAX) 0.5 mg tablet, , Disp: , Rfl:   •  clonazePAM (KlonoPIN) 0.5 mg tablet, , Disp: , Rfl:   •  fluticasone (FLONASE) 50 mcg/act nasal spray, 2 sprays into each nostril daily, Disp: 1 g, Rfl: 0  •  ibuprofen (MOTRIN) 800 mg tablet, , Disp: , Rfl:   •  loratadine (CLARITIN) 10 mg tablet, Take 1 tablet (10 mg total) by mouth daily, Disp: 30 tablet, Rfl: 0  •  LORazepam (Ativan) 1 mg tablet, Take 1 tablet (1 mg total) by mouth every 6 (six) hours as needed for anxiety, Disp: 20 tablet, Rfl: 0  •  Multiple Vitamin (MULTI-DAY PO), Take by mouth, Disp: , Rfl:   •  Omega-3 Fatty Acids (FISH OIL PO), Take 2 g by mouth, Disp: , Rfl:   •  pantoprazole (PROTONIX) 40 mg tablet, , Disp: , Rfl:   •  traZODone (DESYREL) 50 mg tablet, , Disp: , Rfl:   •  ascorbic acid (VITAMIN C) 500 mg tablet, Take 500 mg by mouth daily (Patient not taking: Reported on 3/11/2022), Disp: , Rfl:   •  benzonatate (TESSALON) 200 MG capsule, Take 1 capsule (200 mg total) by mouth 3 (three) times a day as needed for cough (Patient not taking: Reported on 7/14/2023), Disp: 20 capsule, Rfl: 0  •  cholecalciferol (VITAMIN D3) 25 mcg (1,000 units) tablet, , Disp: , Rfl:   •  CHOLECALCIFEROL PO, Take 2,000 Units by mouth (Patient not taking: Reported on 3/11/2022), Disp: , Rfl:   •  ferrous sulfate 325 (65 Fe) mg tablet, Take 325 mg by mouth daily with breakfast (Patient not taking: Reported on 3/11/2022), Disp: , Rfl:   •  Jessica 0.35 MG tablet, , Disp: , Rfl:   •  hydrocortisone 2.5 % cream, Apply 1 application topically 2 (two) times a day (Patient not taking: Reported on 6/22/2022), Disp: , Rfl:   •  hydrOXYzine HCL (ATARAX) 25 mg tablet, , Disp: , Rfl:   •  iron polysaccharides (FERREX) 150 mg capsule, Take 150 mg by mouth daily, Disp: , Rfl:   •  omeprazole (PriLOSEC) 20 mg delayed release capsule, Take 1 capsule (20 mg total) by mouth daily (Patient not taking: Reported on 4/30/2023), Disp: 30 capsule, Rfl: 0  •  oxyCODONE (ROXICODONE) 5 immediate release tablet, Take 5 mg by mouth every 8 (eight) hours as needed (Patient not taking: Reported on 6/15/2022), Disp: , Rfl:   •  pramoxine-hydrocortisone cream, Apply topically 3 (three) times a day (Patient not taking: Reported on 2022), Disp: 57 g, Rfl: 0  •  sertraline (ZOLOFT) 50 mg tablet, , Disp: , Rfl: 3  •  triamcinolone (KENALOG) 0.1 % cream, Apply topically 3 (three) times a day (Patient not taking: Reported on 2022), Disp: 80 g, Rfl: 1    Current Allergies     Allergies as of 2023 - Reviewed 2023   Allergen Reaction Noted   • Metronidazole GI Intolerance 2018   • Azithromycin Palpitations 2017   • Tramadol Palpitations 2016          The following portions of the patient's history were reviewed and updated as appropriate: allergies, current medications, past family history, past medical history, past social history, past surgical history and problem list.   Past Medical History:   Diagnosis Date   • Anxiety    • Depression      Past Surgical History:   Procedure Laterality Date   •  SECTION     • DILATION AND CURETTAGE, DIAGNOSTIC / THERAPEUTIC       History reviewed. No pertinent family history.   Social History     Socioeconomic History   • Marital status: Single     Spouse name: Not on file   • Number of children: Not on file   • Years of education: Not on file   • Highest education level: Not on file   Occupational History   • Not on file   Tobacco Use   • Smoking status: Every Day     Packs/day: 0.25     Years: 20.00     Total pack years: 5.00     Types: Cigarettes   • Smokeless tobacco: Never   Vaping Use   • Vaping Use: Never used   Substance and Sexual Activity   • Alcohol use: Never   • Drug use: Not Currently     Types: Marijuana     Comment: Medical dennis   • Sexual activity: Not on file   Other Topics Concern   • Not on file   Social History Narrative   • Not on file     Social Determinants of Health     Financial Resource Strain: Not on file   Food Insecurity: Not on file   Transportation Needs: Not on file   Physical Activity: Not on file   Stress: Not on file   Social Connections: Not on file   Intimate Partner Violence: Not on file   Housing Stability: Not on file     Medications have been verified. Objective   /94   Pulse 96   Temp 99.1 °F (37.3 °C)   Resp 20   Ht 5' 3.5" (1.613 m)   Wt 63.7 kg (140 lb 6.4 oz)   SpO2 97%   BMI 24.48 kg/m²      Physical Exam   Physical Exam  Vitals and nursing note reviewed. Constitutional:       General: She is not in acute distress. Appearance: Normal appearance. She is well-developed. HENT:      Head: Normocephalic and atraumatic. Cardiovascular:      Rate and Rhythm: Normal rate and regular rhythm. Heart sounds: Normal heart sounds. No murmur heard. Pulmonary:      Effort: Pulmonary effort is normal. No respiratory distress. Breath sounds: Normal breath sounds. Abdominal:      General: Bowel sounds are normal.      Tenderness: There is no abdominal tenderness. There is no right CVA tenderness or left CVA tenderness.    Psychiatric:      Comments: Appears anxious

## 2023-07-14 NOTE — PATIENT INSTRUCTIONS
Discharge and Safety    This writer discussed the patients current presentation and recommended discharge plan with Dalton Oleary agree with the patient being discharged at this time with referrals and/or information about medication and therapeutic supports. The patient was Instructed to follow up with their PCP and [facility]. The patient was provided with referral information for: Partial program and therapy services. This writer and the patient completed a safety plan. The patient was provided with a copy of their safety plan with encouragement to utilize the plan following discharge. In addition, the patient was instructed to call local [facility] crisis, other crisis services, [facility] or to go to the nearest ER immediately if their situation changes at any time. This writer discussed discharge plans with the patient who agrees with and understands the discharge plans.      SAFETY PLAN  Warning Signs (thoughts, images, mood, behavior, situations) of a potential crisis: Increase anxiety, continued lack of sleep, forgetting or ignoring daily ADLs, any feeling of self harm      Coping Skills (what can I do to take my mind off the problem, or to keep myself safe): Exercise(walks/hiking) time with family, reading      Outside Support (who can I reach out to for support and help): Friends and family        National Suicide Prevention Hotline:  3-745.970.1283    [facility] [address]: [address]: [address] [place] [address] [phone] - Crisis   [phone] - Peer Support Talk Line (1-9pm daily)  [phone] - [facility] (1-9pm daily)  [address] [place] [address] [address] [place] [address]

## 2023-07-16 ENCOUNTER — HOSPITAL ENCOUNTER (EMERGENCY)
Facility: HOSPITAL | Age: 38
Discharge: HOME | End: 2023-07-16
Attending: EMERGENCY MEDICINE
Payer: COMMERCIAL

## 2023-07-16 VITALS
HEART RATE: 94 BPM | DIASTOLIC BLOOD PRESSURE: 96 MMHG | OXYGEN SATURATION: 98 % | TEMPERATURE: 97.9 F | BODY MASS INDEX: 23.9 KG/M2 | WEIGHT: 140 LBS | SYSTOLIC BLOOD PRESSURE: 144 MMHG | HEIGHT: 64 IN | RESPIRATION RATE: 16 BRPM

## 2023-07-16 DIAGNOSIS — F41.9 ANXIETY: Primary | ICD-10-CM

## 2023-07-16 PROCEDURE — 99283 EMERGENCY DEPT VISIT LOW MDM: CPT

## 2023-07-16 PROCEDURE — 99284 EMERGENCY DEPT VISIT MOD MDM: CPT | Performed by: EMERGENCY MEDICINE

## 2023-07-16 RX ORDER — HYDROXYZINE HYDROCHLORIDE 25 MG/1
25 TABLET, FILM COATED ORAL EVERY 6 HOURS PRN
Qty: 40 TABLET | Refills: 0 | Status: SHIPPED | OUTPATIENT
Start: 2023-07-16

## 2023-07-16 NOTE — ED PROVIDER NOTES
History  Chief Complaint   Patient presents with   • Anxiety     Reports after having her gallbladder removed 3 weeks ago she "hasn't been the same". Reports can't sleep, and "I'm just here to see if it is okay to take my medication"     Patient is a 22-year-old female. She does have a history of anxiety. She has had increased anxiety since having her gallbladder out. I saw her for hypertension and chest pain a couple weeks ago. Cardiac work-up was unremarkable. She did rule out for pulmonary embolism. Patient did follow-up with her primary MD.  She was prescribed Lexapro but has not started it yet. I had prescribed Ativan to bridge her. Her primary MD switched her over to [address] [place], [place] [address]St . She continues to experience anxiety. She continues to have poor sleep. Her blood pressure and sometimes her heart rate are high. No chest pain or shortness of breath at this time. She is not suicidal.          Prior to Admission Medications   Prescriptions Last Dose Informant Patient Reported? Taking?    ALPRAZolam (XANAX) 0.25 mg tablet  Self Yes No   Sig: Take 0.25 mg by mouth every 24 hours   ALPRAZolam (XANAX) 0.5 mg tablet   Yes No   CHOLECALCIFEROL PO   Yes No   Sig: Take 2,000 Units by mouth   Patient not taking: Reported on 3/11/2022   Jessica 0.35 MG tablet  Self Yes No   Patient not taking: Reported on 6/22/2022   LORazepam (Ativan) 1 mg tablet   No No   Sig: Take 1 tablet (1 mg total) by mouth every 6 (six) hours as needed for anxiety   Multiple Vitamin (MULTI-DAY PO)  Self Yes No   Sig: Take by mouth   Omega-3 Fatty Acids (FISH OIL PO)   Yes No   Sig: Take 2 g by mouth   ascorbic acid (VITAMIN C) 500 mg tablet  Self Yes No   Sig: Take 500 mg by mouth daily   Patient not taking: Reported on 3/11/2022   benzonatate (TESSALON) 200 MG capsule   No No   Sig: Take 1 capsule (200 mg total) by mouth 3 (three) times a day as needed for cough   Patient not taking: Reported on 7/14/2023   cholecalciferol (VITAMIN D3) 25 mcg (1,000 units) tablet  Self Yes No   Patient not taking: Reported on 2023   clonazePAM (KlonoPIN) 0.5 mg tablet   Yes No   ferrous sulfate 325 (65 Fe) mg tablet  Self Yes No   Sig: Take 325 mg by mouth daily with breakfast   Patient not taking: Reported on 3/11/2022   fluticasone (FLONASE) 50 mcg/act nasal spray   No No   Si sprays into each nostril daily   hydrOXYzine HCL (ATARAX) 25 mg tablet   Yes No   Patient not taking: Reported on 6/15/2022   hydrocortisone 2.5 % cream   Yes No   Sig: Apply 1 application topically 2 (two) times a day   Patient not taking: Reported on 2022   ibuprofen (MOTRIN) 800 mg tablet   Yes No   iron polysaccharides (FERREX) 150 mg capsule  Self Yes No   Sig: Take 150 mg by mouth daily   loratadine (CLARITIN) 10 mg tablet   No No   Sig: Take 1 tablet (10 mg total) by mouth daily   omeprazole (PriLOSEC) 20 mg delayed release capsule   No No   Sig: Take 1 capsule (20 mg total) by mouth daily   Patient not taking: Reported on 2023   oxyCODONE (ROXICODONE) 5 immediate release tablet   Yes No   Sig: Take 5 mg by mouth every 8 (eight) hours as needed   Patient not taking: Reported on 6/15/2022   pantoprazole (PROTONIX) 40 mg tablet   Yes No   pramoxine-hydrocortisone cream   No No   Sig: Apply topically 3 (three) times a day   Patient not taking: Reported on 2022   sertraline (ZOLOFT) 50 mg tablet   Yes No   Patient not taking: Reported on 3/11/2022   traZODone (DESYREL) 50 mg tablet   Yes No   triamcinolone (KENALOG) 0.1 % cream   No No   Sig: Apply topically 3 (three) times a day   Patient not taking: Reported on 2022      Facility-Administered Medications: None       Past Medical History:   Diagnosis Date   • Anxiety    • Depression        Past Surgical History:   Procedure Laterality Date   •  SECTION     • DILATION AND CURETTAGE, DIAGNOSTIC / THERAPEUTIC         History reviewed. No pertinent family history.   I have reviewed and agree with the history as documented. E-Cigarette/Vaping   • E-Cigarette Use Never User      E-Cigarette/Vaping Substances     Social History     Tobacco Use   • Smoking status: Every Day     Packs/day: 0.25     Years: 20.00     Total pack years: 5.00     Types: Cigarettes   • Smokeless tobacco: Never   Vaping Use   • Vaping Use: Never used   Substance Use Topics   • Alcohol use: Never   • Drug use: Not Currently     Types: Marijuana     Comment: Medical dennis       Review of Systems   Constitutional: Negative for chills and fever. HENT: Negative for rhinorrhea and sore throat. Eyes: Negative for pain, redness and visual disturbance. Respiratory: Negative for cough and shortness of breath. Cardiovascular: Negative for chest pain and leg swelling. Gastrointestinal: Negative for abdominal pain, diarrhea and vomiting. Endocrine: Negative for polydipsia and polyuria. Genitourinary: Negative for dysuria, frequency, hematuria, vaginal bleeding and vaginal discharge. Musculoskeletal: Negative for back pain and neck pain. Skin: Negative for rash and wound. Allergic/Immunologic: Negative for immunocompromised state. Neurological: Negative for weakness, numbness and headaches. Hematological: Does not bruise/bleed easily. Psychiatric/Behavioral: Negative for hallucinations and suicidal ideas. The patient is nervous/anxious. All other systems reviewed and are negative. Physical Exam  Physical Exam  Vitals reviewed. Constitutional:       General: She is not in acute distress. HENT:      Head: Normocephalic and atraumatic. Nose: Nose normal.      Mouth/Throat:      Mouth: Mucous membranes are moist.   Eyes:      General:         Right eye: No discharge. Left eye: No discharge. Conjunctiva/sclera: Conjunctivae normal.   Cardiovascular:      Rate and Rhythm: Normal rate and regular rhythm. Pulses: Normal pulses. Heart sounds: Normal heart sounds. No murmur heard. No friction rub. No gallop. Pulmonary:      Effort: Pulmonary effort is normal. No respiratory distress. Breath sounds: Normal breath sounds. No stridor. No wheezing, rhonchi or rales. Abdominal:      General: Bowel sounds are normal. There is no distension. Palpations: Abdomen is soft. Tenderness: There is no abdominal tenderness. There is no right CVA tenderness, left CVA tenderness, guarding or rebound. Comments: Surgical sites look good. Well-healed. No erythema or drainage. Musculoskeletal:         General: No swelling, tenderness, deformity or signs of injury. Normal range of motion. Cervical back: Normal range of motion and neck supple. No rigidity. Right lower leg: No edema. Left lower leg: No edema. Comments: No calf tenderness or unilateral leg swelling. Skin:     General: Skin is warm and dry. Coloration: Skin is not jaundiced. Findings: No rash. Neurological:      General: No focal deficit present. Mental Status: She is alert and oriented to person, place, and time. Sensory: No sensory deficit. Motor: Motor function is intact.    Psychiatric:         Mood and Affect: Mood normal.         Behavior: Behavior normal.         Vital Signs  ED Triage Vitals [07/16/23 1707]   Temperature Pulse Respirations Blood Pressure SpO2   97.9 °F (36.6 °C) 99 20 (!) 169/107 99 %      Temp src Heart Rate Source Patient Position - Orthostatic VS BP Location FiO2 (%)   -- Monitor Sitting Left arm --      Pain Score       --           Vitals:    07/16/23 1707 07/16/23 1730   BP: (!) 169/107 144/96   Pulse: 99 94   Patient Position - Orthostatic VS: Sitting          Visual Acuity      ED Medications  Medications - No data to display    Diagnostic Studies  Results Reviewed     None                 No orders to display              Procedures  Procedures         ED Course                               SBIRT 20yo+    Flowsheet Row Most Recent Value   Initial Alcohol Screen: US AUDIT-C     1. How often do you have a drink containing alcohol? 0 Filed at: 07/16/2023 1709   2. How many drinks containing alcohol do you have on a typical day you are drinking? 0 Filed at: 07/16/2023 1709   3a. Male UNDER 65: How often do you have five or more drinks on one occasion? 0 Filed at: 07/16/2023 1709   3b. FEMALE Any Age, or MALE 65+: How often do you have 4 or more drinks on one occassion? 0 Filed at: 07/16/2023 1709   Audit-C Score 0 Filed at: 07/16/2023 1709   EDILSON: How many times in the past year have you. .. Used an illegal drug or used a prescription medication for non-medical reasons? Never Filed at: 07/16/2023 1709                    Medical Decision Making  This is not pulmonary embolism, acute coronary syndrome, malignant arrhythmia, hypoglycemia, hyperthyroidism, or other causes of patient's symptoms. This is anxiety. I encouraged patient to start her Lexapro. Encouraged her to continue the Klonopin for now. Eventually she can stop the benzodiazepines. He may require a wean. Will prescribe hydroxyzine in the meantime for breakthrough anxiety. Courage patient to continue follow-up with primary MD.  She is inquiring about a new primary MD.  Will refer to our in [facility] family practice. Amount and/or Complexity of Data Reviewed  External Data Reviewed: labs and notes. Risk  Prescription drug management. Decision regarding hospitalization. Disposition  Final diagnoses:   Anxiety     Time reflects when diagnosis was documented in both MDM as applicable and the Disposition within this note     Time User Action Codes Description Comment    7/16/2023  5:41 PM Elzbieta Mcfarland Add [F41.9] Anxiety       ED Disposition     ED Disposition   Discharge    Condition   Stable    Date/Time   Sun Jul 16, 2023  5:41 PM    Comment   Eleazar Kimbrough discharge to home/self care.                Follow-up Information     Follow up With Specialties Details Why Contact Info Additional Information    [facility] In 1 week  [address] [facility] [address] [phone] [facility], [address], [place], [facility] [address]          Patient's Medications   Discharge Prescriptions    HYDROXYZINE HCL (ATARAX) 25 MG TABLET    Take 1 tablet (25 mg total) by mouth every 6 (six) hours as needed for anxiety       Start Date: 7/16/2023 End Date: --       Order Dose: 25 mg       Quantity: 40 tablet    Refills: 0       No discharge procedures on file.     PDMP Review     None          ED Provider  Electronically Signed by           Lisandro Flores MD  07/16/23 7957

## 2023-07-17 ENCOUNTER — TELEPHONE (OUTPATIENT)
Dept: PSYCHIATRY | Facility: CLINIC | Age: 38
End: 2023-07-17

## 2023-07-17 NOTE — TELEPHONE ENCOUNTER
Telephone call to PT following up from [facility] visit. PT reports "I am doing okay at the moment but I have mom burnout. 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 PT confirmed attendance for this appointment. PT thanked CM for following up and denies needing anything additional at this time.

## 2023-07-26 ENCOUNTER — OFFICE VISIT (OUTPATIENT)
Dept: BEHAVIORAL/MENTAL HEALTH CLINIC | Facility: CLINIC | Age: 38
End: 2023-07-26
Payer: COMMERCIAL

## 2023-07-26 DIAGNOSIS — F41.1 GAD (GENERALIZED ANXIETY DISORDER): Primary | ICD-10-CM

## 2023-07-26 PROCEDURE — 90791 PSYCH DIAGNOSTIC EVALUATION: CPT

## 2023-07-26 NOTE — PSYCH
Behavioral Health Psychotherapy Assessment    Date of Initial Psychotherapy Assessment: 07/31/23  Referral Source: [facility]  Has a release of information been signed for the referral source? Yes    Preferred Name: Clare Kimbrough  Preferred Pronouns: She/her  YOB: 1985 Age: 34 y.o. Sex assigned at birth: female   Gender Identity: female  Race:   Preferred Language: English    Emergency Contact:  Full Name: Wili Turner  Relationship to Client:   Contact information: [phone]    Primary Care Physician:  Wendi King MD  [address] [place] [address]  [phone]  Has a release of information been signed? No    Relevant Family History:  Depression- brother  Bipolar - father and brother  Schizophrenia - father  Anxiety- brother     Lives with  and 3 sons (15,13,8) has a step-daughter (15) . Presenting Problem (What brings you in?)  Per Jon Yeh CIS 07/14/23: Pt stated that she is experience severe anxiety and has been unable to sleep for the past two weeks. Pt recently underwent gallbladder surgery and says her medications that she has been taking for several years were no longer working. Pt received different medications from PCP that also did not help and ended up going to the ED for continued panic attacks. Patient reported that she is having trouble caring for herself and three children at this time. She is concerned about her physical health and what would happen to her family if she was not around. Pt was very lethargic and tearful throughout the conversations. Pt reported that she has not received any type of  treatment except for one therapy session she received through [facility] the previous week. No SI, HI, Ah nor VH past or presents. Pt reports stressors with current and previous  at this time. No other stressors reported. Mental Health Advance Directive:  Do you currently have a Mental Health Advance Directive? no    Diagnosis: Diagnosis ICD-10-CM Associated Orders   1. STACY (generalized anxiety disorder)  F41.1           Initial Assessment:     Current Mental Status:    Appearance: appropriate and casual      Behavior/Manner: cooperative      Affect/Mood:  Anxious    Speech:  Normal and talkative    Sleep:  Normal    Oriented to: oriented to self, oriented to place and oriented to time       Clinical Symptoms    Depression: yes      Anxiety: yes      Depression Symptoms: restlessness, poor concentration and irritable      Anxiety Symptoms: excessive worry, fatigues easily, irritable, restlessness, chest tightness, shortness of breath, chills and hot flashes      Have you ever been assaultive to others or the environment: No      Have you ever been self-injurious: No      Counseling History:  Previous Counseling or Treatment  (Mental Health or Drug & Alcohol): Yes    Previous Counseling Details:  Completed an intake with [facility] but did not say within the last month.    Have you previously taken psychiatric medications: Yes    Previous Medications Attempted:  Ativan, klonopin, trazodone    Suicide Risk Assessment  Have you ever had a suicide attempt: No    Have you had incidents of suicidal ideation: No    Are you currently experiencing suicidal thoughts: No      Substance Abuse/Addiction Assessment:  Alcohol: No    Heroin: No    Fentanyl: No    Opiates: No    Cocaine: No    Amphetamines: No    Hallucinogens: No    Club Drugs: No    Benzodiazepines: No    Other Rx Meds: No    Marijuana: Yes    Frequency:  Weekly  Method:  Smoke/pipe  Tobacco/Nicotine: Yes    Frequency:  Daily  Amount:  8/day  Method:  Smoke/pipe  Are you interested in resources for smoking cessation: No    Have you experienced blackouts as a result of substance use: No    Have you had any periods of abstinence: No    Have you experienced symptoms of withdrawal: No    Have you ever overdosed on any substances?: No    Are you currently using any Medication Assisted Treatment for Substance Use: No      Disordered Eating History:  Do you have a history of disordered eating: No      Social Determinants of Health:    SDOH:  Financial instability, food insecurity and stress    Trauma and Abuse History:    Have you ever been abused: No      Legal History:    Have you ever been arrested  or had a DUI: No      Have you been incarcerated: No      Are you currently on parole/probation: No      Any current Children and Youth involvement: No      Any pending legal charges: No      Relationship History:    Current marital status:       Natural Supports:   Mother, siblings and other    Other natural supports:       Relationship History:  Positive relationship with mother and brother     Employment History    Are you currently employed: Yes      Employer/ Job title:  [employer]    Sources of income/financial support:  Work     History:      Status: no history of [address] duty  Educational History:     Have you ever been diagnosed with a learning disability: No      Highest level of education:  Some college    Have you ever had an IEP or 504-plan: No      Do you need assistance with reading or writing: No      Recommended Treatment:     Psychotherapy:  Individual sessions    Frequency:  2 times    Session frequency:  Monthly      Visit start and stop times:    07/26/23  Start Time: 1500  Stop Time: 1550  Total Visit Time: 50 minutes

## 2023-07-27 ENCOUNTER — OFFICE VISIT (OUTPATIENT)
Dept: PSYCHIATRY | Facility: CLINIC | Age: 38
End: 2023-07-27
Payer: COMMERCIAL

## 2023-07-27 DIAGNOSIS — F41.1 GENERALIZED ANXIETY DISORDER: Primary | ICD-10-CM

## 2023-07-27 PROBLEM — G47.00 INSOMNIA: Status: ACTIVE | Noted: 2023-07-24

## 2023-07-27 PROBLEM — R10.9 ABDOMINAL PAIN, ACUTE: Status: ACTIVE | Noted: 2017-08-01

## 2023-07-27 PROBLEM — R03.0 ELEVATED BP WITHOUT DIAGNOSIS OF HYPERTENSION: Status: ACTIVE | Noted: 2023-06-30

## 2023-07-27 PROCEDURE — 90792 PSYCH DIAG EVAL W/MED SRVCS: CPT

## 2023-07-27 RX ORDER — ESCITALOPRAM OXALATE 5 MG/1
5 TABLET ORAL DAILY
Qty: 30 TABLET | Refills: 1 | Status: SHIPPED | OUTPATIENT
Start: 2023-07-27

## 2023-07-27 NOTE — BH TREATMENT PLAN
TREATMENT PLAN (Medication Management Only)        [address] [facility]    Name and Date of Birth:  Tobias Montesinos 34 y.o. 1985  Date of Treatment Plan: July 27, 2023  Diagnosis/Diagnoses:    1. Anxiety disorder, unspecified type      Strengths/Personal Resources for Self-Care: supportive family, self-reliance. Area/Areas of need (in own words): anxiety symptoms  1. Long Term Goal: improve control of acceptable anxiety level. Target Date:6 months - 1/27/2024  Person/Persons responsible for completion of goal: Geoffrey Ny  2. Short Term Objective (s) - How will we reach this goal?:   A. Provider new recommended medication/dosage changes and/or continue medication(s): continue current medications as prescribed. B. Attend medication management appointments regularly. C. Take psychiatric medications responsibly. Target Date:6 months - 1/27/2024  Person/Persons Responsible for Completion of Goal: Geoffrey Ny  Progress Towards Goals: stable, starting treatment  Treatment Modality: medication management every 1 months  Review due 180 days from date of this plan: 6 months - 1/27/2024  Expected length of service: ongoing treatment  My Physician/PA/NP and I have developed this plan together and I agree to work on the goals and objectives. I understand the treatment goals that were developed for my treatment.
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
## 2023-08-01 ENCOUNTER — OFFICE VISIT (OUTPATIENT)
Dept: PSYCHIATRY | Facility: CLINIC | Age: 38
End: 2023-08-01
Payer: COMMERCIAL

## 2023-08-01 DIAGNOSIS — F41.1 GENERALIZED ANXIETY DISORDER: Primary | ICD-10-CM

## 2023-08-01 PROBLEM — N94.89 ADNEXAL MASS: Status: ACTIVE | Noted: 2022-05-19

## 2023-08-01 PROBLEM — N76.0 ACUTE VAGINITIS: Status: ACTIVE | Noted: 2017-01-04

## 2023-08-01 PROBLEM — I10 HYPERTENSION: Status: ACTIVE | Noted: 2023-07-24

## 2023-08-01 PROCEDURE — 99213 OFFICE O/P EST LOW 20 MIN: CPT

## 2023-08-01 RX ORDER — QUETIAPINE FUMARATE 25 MG/1
25 TABLET, FILM COATED ORAL
Qty: 30 TABLET | Refills: 1 | Status: SHIPPED | OUTPATIENT
Start: 2023-08-01

## 2023-08-01 NOTE — PSYCH
Regular Visit    Problem List Items Addressed This Visit    None  Visit Diagnoses     Generalized anxiety disorder    -  Primary    Relevant Medications    QUEtiapine (SEROquel) 25 mg tablet             Encounter provider BRANDON Becerra    Provider located at    [id number] [address] [address] [phone]    Recent Visits  Date Type Provider Dept   07/27/23 Office Visit BRANDON Becerra [facility] Rozina Martinez recent visits within past 7 days and meeting all other requirements  Today's Visits  Date Type Provider Dept   08/01/23 Office Visit BRANDON Becerra [facility] [place]   Showing today's visits and meeting all other requirements  Future Appointments  No visits were found meeting these conditions.   Showing future appointments within next 150 days and meeting all other requirements       HPI     Current Outpatient Medications   Medication Sig Dispense Refill   • QUEtiapine (SEROquel) 25 mg tablet Take 1 tablet (25 mg total) by mouth daily at bedtime 30 tablet 1   • ALPRAZolam (XANAX) 0.5 mg tablet      • benzonatate (TESSALON) 200 MG capsule Take 1 capsule (200 mg total) by mouth 3 (three) times a day as needed for cough (Patient not taking: Reported on 7/14/2023) 20 capsule 0   • cholecalciferol (VITAMIN D3) 25 mcg (1,000 units) tablet  (Patient not taking: Reported on 4/2/2023)     • CHOLECALCIFEROL PO Take 2,000 Units by mouth (Patient not taking: Reported on 3/11/2022)     • escitalopram (LEXAPRO) 5 mg tablet Take 1 tablet (5 mg total) by mouth daily 30 tablet 1   • ferrous sulfate 325 (65 Fe) mg tablet Take 325 mg by mouth daily with breakfast (Patient not taking: Reported on 3/11/2022)     • fluticasone (FLONASE) 50 mcg/act nasal spray 2 sprays into each nostril daily 1 g 0   • Jessica 0.35 MG tablet  (Patient not taking: Reported on 6/22/2022)     • hydrocortisone 2.5 % cream Apply 1 application topically 2 (two) times a day (Patient not taking: Reported on 6/22/2022)     • hydrOXYzine HCL (ATARAX) 25 mg tablet Take 1 tablet (25 mg total) by mouth every 6 (six) hours as needed for anxiety 40 tablet 0   • ibuprofen (MOTRIN) 800 mg tablet      • iron polysaccharides (FERREX) 150 mg capsule Take 150 mg by mouth daily     • loratadine (CLARITIN) 10 mg tablet Take 1 tablet (10 mg total) by mouth daily 30 tablet 0   • Multiple Vitamin (MULTI-DAY PO) Take by mouth     • Omega-3 Fatty Acids (FISH OIL PO) Take 2 g by mouth     • omeprazole (PriLOSEC) 20 mg delayed release capsule Take 1 capsule (20 mg total) by mouth daily (Patient not taking: Reported on 4/30/2023) 30 capsule 0   • pantoprazole (PROTONIX) 40 mg tablet        No current facility-administered medications for this visit. Review of Systems        MEDICATION MANAGEMENT NOTE        [facility]    Name and Date of Birth:  Lise Daniels 34 y.o. 1985 MRN: [id number]    Date of Visit: August 1, 2023    Allergies   Allergen Reactions   • Metronidazole GI Intolerance   • Azithromycin Palpitations   • Tramadol Palpitations     Pt felt horrible      SUBJECTIVE:    Michel Richard is seen today for a follow up for Generalized Anxiety Disorder. She continues to experience ongoing symptoms since the last visit. Patient had no appointment today, came into the office seeking help stating " I cannot sleep at all and this is causing my anxiety to go crazy". Patient was just evaluated in his office 4 days ago for initial intake where Lexapro 5 mg daily initiated. States she is only sleeping 2 hours at nighttime and fears her body is going to shut down for lack of sleep. Encouraged healthy sleep habits, encouraged patient to go to the ER immediately if she needs immediate medical help and patient verbalized understanding.  She was agreeable to initiate Seroquel 25 mg at bedtime for severe insomnia and anxiety, if ineffective, may increase to Seroquel 50 mg at bedtime, do not go higher without discussion with provider and patient agreed to this plan. Previously failed trazodone and Ambien in the recent past for sleep. Patient continues to have multiple life stressors and appears to have difficulty controlling anxiety, stress, emotions. Healthy anxiety from Gallbladder surgery 1 month ago. Provider continues to highly recommend the partial program to aid with symptoms, patient unsure if she has time to complete with work schedule. Instructed her short-term disability can be provided if needed. Call provider if would like referral the patient is to follow-up appointment in 2 weeks with this provider. Denies SI and HI. She denies any side effects from medications. PLAN:    -Initiate Seroquel 25 mg, may use 50mg if needed at bedtime for severe insomnia and nighttime anxiety. Previously failed trazodone, Ambien. PARQ completed including dizziness, sedation, GI distress, orthostatic hypotension and cardiovascular risks, metabolic syndrome, NMS, TD, EPS, Seizures, and others       -Continue Lexapro 5 mg daily for persistent anxiety symptoms, titrate at subsequent visit in 2 weeks  PARQ completed including serotonin syndrome, SIADH, worsening depression, suicidality, induction of phan, GI upset, headaches, activation, sexual side effects, sedation, potential drug interactions, and others.     -Continue Xanax 0.5 mg daily as needed for severe anxiety only to bridge the gap for SSRI-patient and provider have goal to completely discontinue medication once symptoms are stabilized. Discussed with patient the risks of sedation, respiratory depression, impairment of ability to drive and potential for abuse and addiction related to treatment with benzodiazepine medications.  The patient understands risk of treatment with benzodiazepine medications, agrees to not drive if feels impaired and agrees to take medications as prescribed. Patient was also informed of risks of being on or starting opioid medications due to drug interactions and potential for serious respiratory depression and death.        -Continue recently initiated psychotherapy every 2 weeks for emotional regulation, coping skills, CBT therapy  -Consider partial      Aware of 24 hour and weekend coverage for urgent situations accessed by calling [facility] main practice number  Continue psychotherapy with therapist    Diagnoses and all orders for this visit:    Generalized anxiety disorder  -     QUEtiapine (SEROquel) 25 mg tablet;  Take 1 tablet (25 mg total) by mouth daily at bedtime        Current Outpatient Medications on File Prior to Visit   Medication Sig Dispense Refill   • ALPRAZolam (XANAX) 0.5 mg tablet      • benzonatate (TESSALON) 200 MG capsule Take 1 capsule (200 mg total) by mouth 3 (three) times a day as needed for cough (Patient not taking: Reported on 7/14/2023) 20 capsule 0   • cholecalciferol (VITAMIN D3) 25 mcg (1,000 units) tablet  (Patient not taking: Reported on 4/2/2023)     • CHOLECALCIFEROL PO Take 2,000 Units by mouth (Patient not taking: Reported on 3/11/2022)     • escitalopram (LEXAPRO) 5 mg tablet Take 1 tablet (5 mg total) by mouth daily 30 tablet 1   • ferrous sulfate 325 (65 Fe) mg tablet Take 325 mg by mouth daily with breakfast (Patient not taking: Reported on 3/11/2022)     • fluticasone (FLONASE) 50 mcg/act nasal spray 2 sprays into each nostril daily 1 g 0   • Jessica 0.35 MG tablet  (Patient not taking: Reported on 6/22/2022)     • hydrocortisone 2.5 % cream Apply 1 application topically 2 (two) times a day (Patient not taking: Reported on 6/22/2022)     • hydrOXYzine HCL (ATARAX) 25 mg tablet Take 1 tablet (25 mg total) by mouth every 6 (six) hours as needed for anxiety 40 tablet 0   • ibuprofen (MOTRIN) 800 mg tablet      • iron polysaccharides (FERREX) 150 mg capsule Take 150 mg by mouth daily     • loratadine (CLARITIN) 10 mg tablet Take 1 tablet (10 mg total) by mouth daily 30 tablet 0   • Multiple Vitamin (MULTI-DAY PO) Take by mouth     • Omega-3 Fatty Acids (FISH OIL PO) Take 2 g by mouth     • omeprazole (PriLOSEC) 20 mg delayed release capsule Take 1 capsule (20 mg total) by mouth daily (Patient not taking: Reported on 4/30/2023) 30 capsule 0   • pantoprazole (PROTONIX) 40 mg tablet        No current facility-administered medications on file prior to visit. HPI ROS Appetite Changes and Sleep:     She reports difficulty falling asleep, frequent awakenings, fluctuating sleep pattern, interrupted sleep, adequate appetite, low energy.  Denies homicidal ideation, denies suicidal ideation    Review Of Systems:      HPI ROS:               Medication Side Effects:  denies    Depression (10 worst): 4/10    Anxiety (10 worst): 8/10    Safety concerns (SI, HI, etc): Denies si and hi    Sleep: Very poor 2 hrs    Energy: low    Appetite: low    Weight Change: denies        Mental Status Evaluation:    Appearance Adequate hygiene and grooming   Behavior restless and fidgety   Mood anxious  Depression Scale - 4 of 10 (0 = No depression)  Anxiety Scale - 8 of 10 (0 = No anxiety)   Speech Normal rate and volume   Affect Tearful   Thought Processes Goal directed and coherent   Thought Content Does not verbalize delusional material   Associations Tightly connected   Perceptual Disturbances Denies hallucinations and does not appear to be responding to internal stimuli   Risk Potential Suicidal/Homicidal Ideation - No evidence of suicidal or homicidal ideation and patient does not verbalize suicidal or homicidal ideation  Risk of Violence - No evidence of risk for violence found on assessment  Risk of Self Mutilation - No evidence of risk for self mutilation found on assessment   Orientation oriented to person, place, time/date and situation   Memory recent and remote memory grossly intact   Consciousness alert and awake   Attention/Concentration attention span and concentration appear shorter than expected for age   Insight intact   Judgement intact   Muscle Strength and Gait normal muscle strength and normal muscle tone, normal gait/station and normal balance   Motor Activity no abnormal movements   Language no difficulty naming common objects, no difficulty repeating a phrase, no difficulty writing a sentence   Fund of Knowledge adequate knowledge of current events  adequate fund of knowledge regarding past history  adequate fund of knowledge regarding vocabulary      Traumatic History Update:     New Onset of Abuse Since Last Encounter:   no  Traumatic Events Since Last Encounter:   no    Past Medical History:    Past Medical History:   Diagnosis Date   • Anxiety    • Depression         Past Surgical History:   Procedure Laterality Date   •  SECTION     • DILATION AND CURETTAGE, DIAGNOSTIC / THERAPEUTIC       Allergies   Allergen Reactions   • Metronidazole GI Intolerance   • Azithromycin Palpitations   • Tramadol Palpitations     Pt felt horrible      Substance Abuse History:    Social History     Substance and Sexual Activity   Alcohol Use Never     Social History     Substance and Sexual Activity   Drug Use Not Currently   • Types: Marijuana    Comment: David collins     Social History:    Social History     Socioeconomic History   • Marital status: Single     Spouse name: Not on file   • Number of children: Not on file   • Years of education: Not on file   • Highest education level: Not on file   Occupational History   • Not on file   Tobacco Use   • Smoking status: Every Day     Packs/day: 0.25     Years: 20.00     Total pack years: 5.00     Types: Cigarettes   • Smokeless tobacco: Never   Vaping Use   • Vaping Use: Never used   Substance and Sexual Activity   • Alcohol use: Never   • Drug use: Not Currently     Types: Marijuana     Comment: David collins   • Sexual activity: Not on file Other Topics Concern   • Not on file   Social History Narrative   • Not on file     Social Determinants of Health     Financial Resource Strain: Not on file   Food Insecurity: Not on file   Transportation Needs: Not on file   Physical Activity: Not on file   Stress: Not on file   Social Connections: Not on file   Intimate Partner Violence: Not on file   Housing Stability: Not on file     Family Psychiatric History:     No family history on file. History Review: The following portions of the patient's history were reviewed and updated as appropriate: allergies, current medications, past family history, past medical history, past social history, past surgical history and problem list     OBJECTIVE:     Vital signs in last 24 hours: There were no vitals filed for this visit.   Laboratory Results:   Recent Labs (last 2 months):   Office Visit on 07/14/2023   Component Date Value   • LEUKOCYTE ESTERASE,UA 07/14/2023 neg    • NITRITE,UA 07/14/2023 neg    • SL AMB POCT UROBILINOGEN 07/14/2023 0.2    • POCT URINE PROTEIN 07/14/2023 neg    •  PH,UA 07/14/2023 5.0    • BLOOD,UA 07/14/2023 neg    • SPECIFIC GRAVITY,UA 07/14/2023 1.005    • KETONES,UA 07/14/2023 neg    • BILIRUBIN,UA 07/14/2023 neg    • GLUCOSE, UA 07/14/2023 neg    •  COLOR,UA 07/14/2023 straw    • CLARITY,UA 07/14/2023 hazy    Appointment on 07/11/2023   Component Date Value   • TSH 3RD GENERATON 07/11/2023 0.857    Admission on 07/03/2023, Discharged on 07/03/2023   Component Date Value   • WBC 07/03/2023 14.01 (H)    • RBC 07/03/2023 5.29 (H)    • Hemoglobin 07/03/2023 13.0    • Hematocrit 07/03/2023 41.0    • MCV 07/03/2023 78 (L)    • MCH 07/03/2023 24.6 (L)    • MCHC 07/03/2023 31.7    • RDW 07/03/2023 15.1    • MPV 07/03/2023 9.2    • Platelets 98/33/5196 387    • nRBC 07/03/2023 0    • Neutrophils Relative 07/03/2023 79 (H)    • Immat GRANS % 07/03/2023 1    • Lymphocytes Relative 07/03/2023 12 (L)    • Monocytes Relative 07/03/2023 6    • Eosinophils Relative 07/03/2023 1    • Basophils Relative 07/03/2023 1    • Neutrophils Absolute 07/03/2023 11.15 (H)    • Immature Grans Absolute 07/03/2023 0.09    • Lymphocytes Absolute 07/03/2023 1.66    • Monocytes Absolute 07/03/2023 0.84    • Eosinophils Absolute 07/03/2023 0.19    • Basophils Absolute 07/03/2023 0.08    • Sodium 07/03/2023 135    • Potassium 07/03/2023 3.7    • Chloride 07/03/2023 102    • CO2 07/03/2023 24    • ANION GAP 07/03/2023 9    • BUN 07/03/2023 10    • Creatinine 07/03/2023 0.66    • Glucose 07/03/2023 108    • Calcium 07/03/2023 9.6    • AST 07/03/2023 21    • ALT 07/03/2023 45    • Alkaline Phosphatase 07/03/2023 85    • Total Protein 07/03/2023 7.8    • Albumin 07/03/2023 4.6    • Total Bilirubin 07/03/2023 0.36    • eGFR 07/03/2023 111    • hs TnI 0hr 07/03/2023 <2    • D-Dimer, Quant 07/03/2023 1.04 (H)    • EXT Preg Test, Ur 07/03/2023 Negative    • Control 07/03/2023 Valid    • Ventricular Rate 07/03/2023 88    • Atrial Rate 07/03/2023 88    • AZ Interval 07/03/2023 138    • QRSD Interval 07/03/2023 90    • QT Interval 07/03/2023 352    • QTC Interval 07/03/2023 425    • P Axis 07/03/2023 74    • QRS Axis 07/03/2023 60    • T Wave Axis 07/03/2023 32      I have personally reviewed all pertinent laboratory/tests results.     Suicide/Homicide Risk Assessment:    Risk of Harm to Self:  Protective Factors: no current suicidal ideation, access to mental health treatment, compliant with medications, compliant with mental health treatment, connection to community, connection to own children, having a desire to be alive, having a sense of purpose or meaning in life  Based on today's assessment, Elvie Later presents the following risk of harm to self: minimal    Risk of Harm to Others:  Based on today's assessment, Elvie Later presents the following risk of harm to others: none    The following interventions are recommended: referral for psychotherapy    Medications Risks/Benefits:      Risks, Benefits And Possible Side Effects Of Medications:    Discussed risks and benefits of treatment with patient including risk of suicidality, serotonin syndrome, increased QTc interval and SIADH related to treatment with antidepressants; Risk of induction of manic symptoms in certain patient populations, risk of parkinsonian symptoms, metabolic syndrome, tardive dyskinesia and neuroleptic malignant syndrome related to treatment with antipsychotic medications and risks of misuse, abuse or dependence, sedation and respiratory depression related to treatment with benzodiazepine medications     Controlled Medication Discussion:     Mika Gallegos has been filling controlled prescriptions on time as prescribed according to [address] [place] [address] Monitoring Program  Discussed with Mika Gallegos the risks of sedation, respiratory depression, impairment of ability to drive and potential for abuse and addiction related to treatment with benzodiazepine medications. She understands risk of treatment with benzodiazepine medications, agrees to not drive if feels impaired and agrees to take medications as prescribed  Discussed with Calli Dumont warning on concurrent use of benzodiazepines and opioid medications including sedation, respiratory depression, coma and death. She understands the risk of treatment with benzodiazepines in addition to opioids and wants to continue taking those medications  Discussed with Mika Gallegos increased risk of impairment related to concurrent use of benzodiazepines and hypnotic medications including excessive sedation, psychomotor impairment and respiratory depression.  She understands the risk of treatment with benzodiazepines in addition to hypnotic medications and wants to continue taking those medications    Treatment Plan:    Due for update/Updated:   BRANDON Maurer 08/01/23    Visit Time    Visit Start Time: 8950  Visit Stop Time: 1130  Total Visit Duration: 15 minutes

## 2023-08-11 ENCOUNTER — OFFICE VISIT (OUTPATIENT)
Dept: BEHAVIORAL/MENTAL HEALTH CLINIC | Facility: CLINIC | Age: 38
End: 2023-08-11
Payer: COMMERCIAL

## 2023-08-11 DIAGNOSIS — F41.1 GAD (GENERALIZED ANXIETY DISORDER): Primary | ICD-10-CM

## 2023-08-11 PROCEDURE — 90832 PSYTX W PT 30 MINUTES: CPT

## 2023-08-16 NOTE — PSYCH
Behavioral Health Psychotherapy Progress Note    Psychotherapy Provided: Individual Psychotherapy     1. STACY (generalized anxiety disorder)            Goals addressed in session: Goal 1     DATA: met with Olya. Session began with open ended questions to gain feedback on mood. She expressed she feels "a little better" than her initial session. She expressed she continues to struggle with anxiety related symptoms. She did report good sleep over the last 2-3 days. Korin Rader asked questions about anxiety throughout session. Session focused on psychoeducation on triggers, body cues, anxiety related symptoms, and effective coping skills. She asked questions such as if this can be hormone related or from her surgery and she was encouraged to follow up with her PCP. She reported she does not know if talk therapy will be effective. She expressed she feels she knows what to do, just has to implement the skills. She did become tearful discussing her children and feeling that she is not as needed in their lives. Discussed anxiety related to motherhood and the adjustment to children becoming more independent. During this session, this clinician used the following therapeutic modalities: Client-centered Therapy and Supportive Psychotherapy    Substance Abuse was not addressed during this session. If the client is diagnosed with a co-occurring substance use disorder, please indicate any changes in the frequency or amount of use: n/a. Stage of change for addressing substance use diagnoses: No substance use/Not applicable    ASSESSMENT:  Trinity Kimbrough presents with a Euthymic/ normal mood. her affect is Flat, which is congruent, with her mood and the content of the session. The client has made progress on their goals. Trinity Kimbrough presents with a none risk of suicide, none risk of self-harm, and none risk of harm to others. For any risk assessment that surpasses a "low" rating, a safety plan must be developed.     A safety plan was indicated: no  If yes, describe in detail n/a    PLAN: Between sessions, Jennifer Maurice will use natural supports and coping skills. At the next session, the therapist will use Client-centered Therapy and Cognitive Behavioral Therapy to address anxiety related symptoms. Behavioral Health Treatment Plan and Discharge Planning: Jennifer Maurice is aware of and agrees to continue to work on their treatment plan. They have identified and are working toward their discharge goals.  yes    Visit start and stop times:    08/11/23  Start Time: 1100  Stop Time: 1137  Total Visit Time: 37 minutes

## 2023-08-17 ENCOUNTER — HOSPITAL ENCOUNTER (EMERGENCY)
Facility: HOSPITAL | Age: 38
Discharge: HOME | End: 2023-08-17
Attending: EMERGENCY MEDICINE
Payer: COMMERCIAL

## 2023-08-17 VITALS
SYSTOLIC BLOOD PRESSURE: 157 MMHG | BODY MASS INDEX: 23.9 KG/M2 | WEIGHT: 140 LBS | RESPIRATION RATE: 16 BRPM | HEART RATE: 95 BPM | DIASTOLIC BLOOD PRESSURE: 99 MMHG | HEIGHT: 64 IN | OXYGEN SATURATION: 98 % | TEMPERATURE: 97.8 F

## 2023-08-17 DIAGNOSIS — G47.00 INSOMNIA: ICD-10-CM

## 2023-08-17 DIAGNOSIS — F41.9 ANXIETY: Primary | ICD-10-CM

## 2023-08-17 LAB
ANION GAP SERPL CALCULATED.3IONS-SCNC: 8 MMOL/L
BUN SERPL-MCNC: 7 MG/DL (ref 5–25)
CALCIUM SERPL-MCNC: 9.3 MG/DL (ref 8.4–10.2)
CHLORIDE SERPL-SCNC: 106 MMOL/L (ref 96–108)
CO2 SERPL-SCNC: 24 MMOL/L (ref 21–32)
CREAT SERPL-MCNC: 0.58 MG/DL (ref 0.6–1.3)
GFR SERPL CREATININE-BSD FRML MDRD: 115 ML/MIN/1.73SQ M
GLUCOSE SERPL-MCNC: 119 MG/DL (ref 65–140)
MAGNESIUM SERPL-MCNC: 2.2 MG/DL (ref 1.9–2.7)
POTASSIUM SERPL-SCNC: 4.3 MMOL/L (ref 3.5–5.3)
SODIUM SERPL-SCNC: 138 MMOL/L (ref 135–147)
T4 FREE SERPL-MCNC: 0.77 NG/DL (ref 0.61–1.12)
TSH SERPL DL<=0.05 MIU/L-ACNC: 0.54 UIU/ML (ref 0.45–4.5)

## 2023-08-17 PROCEDURE — 99283 EMERGENCY DEPT VISIT LOW MDM: CPT

## 2023-08-17 PROCEDURE — 83735 ASSAY OF MAGNESIUM: CPT | Performed by: EMERGENCY MEDICINE

## 2023-08-17 PROCEDURE — 99284 EMERGENCY DEPT VISIT MOD MDM: CPT | Performed by: EMERGENCY MEDICINE

## 2023-08-17 PROCEDURE — 36415 COLL VENOUS BLD VENIPUNCTURE: CPT | Performed by: EMERGENCY MEDICINE

## 2023-08-17 PROCEDURE — 80048 BASIC METABOLIC PNL TOTAL CA: CPT | Performed by: EMERGENCY MEDICINE

## 2023-08-17 PROCEDURE — 84439 ASSAY OF FREE THYROXINE: CPT | Performed by: EMERGENCY MEDICINE

## 2023-08-17 PROCEDURE — 84443 ASSAY THYROID STIM HORMONE: CPT | Performed by: EMERGENCY MEDICINE

## 2023-08-17 NOTE — ED PROVIDER NOTES
History  Chief Complaint   Patient presents with   • Medical Problem     Patient came to ER for mental fog and anxiety. Trouble sleeping. Symptoms started about two months ago following surgery. 38F cc anxiety, insomnia since  after cholecystectomy. No hi/si          Prior to Admission Medications   Prescriptions Last Dose Informant Patient Reported? Taking?    ALPRAZolam (XANAX) 0.5 mg tablet   Yes No   Multiple Vitamin (MULTI-DAY PO)  Self Yes No   Sig: Take by mouth   Omega-3 Fatty Acids (FISH OIL PO)   Yes No   Sig: Take 2 g by mouth   QUEtiapine (SEROquel) 25 mg tablet Not Taking  No No   Sig: Take 1 tablet (25 mg total) by mouth daily at bedtime   Patient not taking: Reported on 2023   benzonatate (TESSALON) 200 MG capsule   No No   Sig: Take 1 capsule (200 mg total) by mouth 3 (three) times a day as needed for cough   Patient not taking: Reported on 2023   cholecalciferol (VITAMIN D3) 25 mcg (1,000 units) tablet  Self Yes No   Patient not taking: Reported on 2023   escitalopram (LEXAPRO) 5 mg tablet Not Taking  No No   Sig: Take 1 tablet (5 mg total) by mouth daily   Patient not taking: Reported on 2023   ferrous sulfate 325 (65 Fe) mg tablet  Self Yes No   Sig: Take 325 mg by mouth daily with breakfast   Patient not taking: Reported on 3/11/2022   fluticasone (FLONASE) 50 mcg/act nasal spray   No No   Si sprays into each nostril daily   hydrOXYzine HCL (ATARAX) 25 mg tablet Not Taking  No No   Sig: Take 1 tablet (25 mg total) by mouth every 6 (six) hours as needed for anxiety   Patient not taking: Reported on 2023   ibuprofen (MOTRIN) 800 mg tablet   Yes No   iron polysaccharides (FERREX) 150 mg capsule  Self Yes No   Sig: Take 150 mg by mouth daily   loratadine (CLARITIN) 10 mg tablet   No No   Sig: Take 1 tablet (10 mg total) by mouth daily   omeprazole (PriLOSEC) 20 mg delayed release capsule   No No   Sig: Take 1 capsule (20 mg total) by mouth daily   Patient not taking: Reported on 2023   pantoprazole (PROTONIX) 40 mg tablet   Yes No      Facility-Administered Medications: None       Past Medical History:   Diagnosis Date   • Anxiety    • Depression        Past Surgical History:   Procedure Laterality Date   •  SECTION     • DILATION AND CURETTAGE, DIAGNOSTIC / THERAPEUTIC         History reviewed. No pertinent family history. I have reviewed and agree with the history as documented. E-Cigarette/Vaping   • E-Cigarette Use Never User      E-Cigarette/Vaping Substances     Social History     Tobacco Use   • Smoking status: Every Day     Packs/day: 0.25     Years: 20.00     Total pack years: 5.00     Types: Cigarettes   • Smokeless tobacco: Never   Vaping Use   • Vaping Use: Never used   Substance Use Topics   • Alcohol use: Never   • Drug use: Not Currently     Types: Marijuana     Comment: Medical dennis       Review of Systems    Physical Exam  Physical Exam  Vitals and nursing note reviewed. Constitutional:       Appearance: She is normal weight. HENT:      Head: Normocephalic and atraumatic. Eyes:      Conjunctiva/sclera: Conjunctivae normal.      Pupils: Pupils are equal, round, and reactive to light. Cardiovascular:      Rate and Rhythm: Normal rate and regular rhythm. Pulmonary:      Effort: Pulmonary effort is normal. No respiratory distress. Abdominal:      General: Abdomen is flat. There is no distension. Musculoskeletal:         General: No swelling or deformity. Cervical back: Normal range of motion and neck supple. Skin:     General: Skin is dry. Coloration: Skin is not jaundiced. Neurological:      General: No focal deficit present. Mental Status: She is alert and oriented to person, place, and time. Psychiatric:         Mood and Affect: Mood normal.         Thought Content:  Thought content normal.         Vital Signs  ED Triage Vitals [23 0939]   Temperature Pulse Respirations Blood Pressure SpO2 97.8 °F (36.6 °C) 95 16 157/99 98 %      Temp Source Heart Rate Source Patient Position - Orthostatic VS BP Location FiO2 (%)   Tympanic Monitor Sitting Right arm --      Pain Score       No Pain           Vitals:    08/17/23 0939   BP: 157/99   Pulse: 95   Patient Position - Orthostatic VS: Sitting         Visual Acuity      ED Medications  Medications - No data to display    Diagnostic Studies  Results Reviewed     Procedure Component Value Units Date/Time    T4, free [[id number]] Collected: 08/17/23 1109    Lab Status:  In process Specimen: Blood from Arm, Left Updated: 08/17/23 8233    Basic metabolic panel [[id number]]  (Abnormal) Collected: 08/17/23 1109    Lab Status: Final result Specimen: Blood from Arm, Left Updated: 08/17/23 1149     Sodium 138 mmol/L      Potassium 4.3 mmol/L      Chloride 106 mmol/L      CO2 24 mmol/L      ANION GAP 8 mmol/L      BUN 7 mg/dL      Creatinine 0.58 mg/dL      Glucose 119 mg/dL      Calcium 9.3 mg/dL      eGFR 115 ml/min/1.73sq m     Narrative:      [place] guidelines for Chronic Kidney Disease (CKD):   •  Stage 1 with normal or high GFR (GFR > 90 mL/min/1.73 square meters)  •  Stage 2 Mild CKD (GFR = 60-89 mL/min/1.73 square meters)  •  Stage 3A Moderate CKD (GFR = 45-59 mL/min/1.73 square meters)  •  Stage 3B Moderate CKD (GFR = 30-44 mL/min/1.73 square meters)  •  Stage 4 Severe CKD (GFR = 15-29 mL/min/1.73 square meters)  •  Stage 5 End Stage CKD (GFR <15 mL/min/1.73 square meters)  Note: GFR calculation is accurate only with a steady state creatinine    Magnesium [[id number]]  (Normal) Collected: 08/17/23 1109    Lab Status: Final result Specimen: Blood from Arm, Left Updated: 08/17/23 1149     Magnesium 2.2 mg/dL     TSH, 3rd generation with Free T4 reflex [[id number]]  (Normal) Collected: 08/17/23 1109    Lab Status: Final result Specimen: Blood from Arm, Left Updated: 08/17/23 1149     TSH 3RD GENERATON 0.538 uIU/mL                  No orders to display              Procedures  Procedures         ED Course                               SBIRT 22yo+    Flowsheet Row Most Recent Value   Initial Alcohol Screen: US AUDIT-C     1. How often do you have a drink containing alcohol? 0 Filed at: 08/17/2023 0951   2. How many drinks containing alcohol do you have on a typical day you are drinking? 0 Filed at: 08/17/2023 0951   3b. FEMALE Any Age, or MALE 65+: How often do you have 4 or more drinks on one occassion? 0 Filed at: 08/17/2023 0951   Audit-C Score 0 Filed at: 08/17/2023 6670   EDILSON: How many times in the past year have you. .. Used an illegal drug or used a prescription medication for non-medical reasons? Never Filed at: 08/17/2023 8920                    Medical Decision Making  38f cc anxiety/insomnia  Prior charts reviewed  ED visits since June  No SI/HI. No 302  DDx: likely primary insomnia/anxiety  Doubt electrolyte abnormality/thyroid. Last thyroid low normal but normal. Repeat normal. Will send Free T4 but unlikely cause of symptoms. Discussed with patient that this may not be medically caused and could be primary anxiety and treatment should be targeted to symptoms    Declines crisis consult    Anxiety: acute illness or injury  Insomnia: acute illness or injury  Amount and/or Complexity of Data Reviewed  External Data Reviewed: labs. Labs: ordered. Decision-making details documented in ED Course. Disposition  Final diagnoses:   Anxiety   Insomnia     Time reflects when diagnosis was documented in both MDM as applicable and the Disposition within this note     Time User Action Codes Description Comment    8/17/2023 12:50 PM Myron Constant Add [F41.9] Anxiety     8/17/2023 12:50 PM Myron Constant Add [G47.00] Insomnia       ED Disposition     ED Disposition   Discharge    Condition   Stable    Date/Time   Thu Aug 17, 2023 12:50 PM    Comment   Aguila Kimbrough discharge to home/self care.                Follow-up Information Follow up With Specialties Details Why Contact Info Additional Information    [facility] Schedule an appointment as soon as possible for a visit   [address] [facility] [address]  [address], [address], [address], [place], [place], [address], [phone]    Yessi Hernandez MD [facility] Schedule an appointment as soon as possible for a visit   [address] [facility] [address] [place] [address] Schedule an appointment as soon as possible for a visit in 1 day As needed [address]  [facility] [address] [facility], [address], [place], [place], [address], [phone]          Discharge Medication List as of 8/17/2023  1:08 PM      CONTINUE these medications which have NOT CHANGED    Details   ALPRAZolam Olene Ramal) 0.5 mg tablet Starting Fri 11/19/2021, Historical Med      benzonatate (TESSALON) 200 MG capsule Take 1 capsule (200 mg total) by mouth 3 (three) times a day as needed for cough, Starting Sun 4/30/2023, Normal      cholecalciferol (VITAMIN D3) 25 mcg (1,000 units) tablet Starting Mon 4/25/2022, Historical Med      escitalopram (LEXAPRO) 5 mg tablet Take 1 tablet (5 mg total) by mouth daily, Starting Thu 7/27/2023, Normal      ferrous sulfate 325 (65 Fe) mg tablet Take 325 mg by mouth daily with breakfast, Historical Med      fluticasone (FLONASE) 50 mcg/act nasal spray 2 sprays into each nostril daily, Starting Sun 4/30/2023, Normal      hydrOXYzine HCL (ATARAX) 25 mg tablet Take 1 tablet (25 mg total) by mouth every 6 (six) hours as needed for anxiety, Starting Sun 7/16/2023, Normal      ibuprofen (MOTRIN) 800 mg tablet Starting Mon 6/26/2023, Historical Med      iron polysaccharides (FERREX) 150 mg capsule Take 150 mg by mouth daily, Starting Tue 7/20/2021, Until Wed 7/20/2022, Historical Med      loratadine (CLARITIN) 10 mg tablet Take 1 tablet (10 mg total) by mouth daily, Starting Sun 4/30/2023, Normal      Multiple Vitamin (MULTI-DAY PO) Take by mouth, Historical Med      Omega-3 Fatty Acids (FISH OIL PO) Take 2 g by mouth, Historical Med      omeprazole (PriLOSEC) 20 mg delayed release capsule Take 1 capsule (20 mg total) by mouth daily, Starting Sun 4/2/2023, Normal      pantoprazole (PROTONIX) 40 mg tablet Starting Wed 4/5/2023, Historical Med      QUEtiapine (SEROquel) 25 mg tablet Take 1 tablet (25 mg total) by mouth daily at bedtime, Starting Tue 8/1/2023, Normal                 PDMP Review       Value Time User    PDMP Reviewed  Yes 8/17/2023 10:48 AM Daniel Ramirez DO          ED Provider  Electronically Signed by           Daniel Ramirez DO  08/17/23 7405

## 2023-08-18 ENCOUNTER — TELEPHONE (OUTPATIENT)
Dept: PSYCHIATRY | Facility: CLINIC | Age: 38
End: 2023-08-18

## 2023-08-18 NOTE — TELEPHONE ENCOUNTER
Patient had virtual visit scheduled today, she could not connect to platform. Provider reached out via telephone to gain clarification regarding her recent multiple visits with PCP where she was prescribed multiple different psychiatric medications. On 8/8 patient was ordered Inderal 20 mg daily twice daily for anxiety, Klonopin 0.5 mg twice daily for anxiety    On 8/9 patient seen by different provider and was ordered Prozac 20 mg daily, Xanax 1 mg at bedtime. Patient states that she is not taking the Xanax but according to [address] [place] [address] she has filled medication. She was also prescribed #110 tablets of xanax by PCP on  7/7/23. It is unclear what psychiatric medications  patient is taking as she is currently denying takinig all of the above medications listed despite recent fill of Xanax seen in PDMP. Educated patient about the need to have 1 psychiatric provider and this provider will not provide any psychiatric medications if she is seeing multiple other providers for  psychiatric medications and multiple controlled substances. Patient demonstrated understanding. Patient will be discharged from this practice unless she calls and notify this office regarding seeing this provider only for psychiatric medications going forward. She verbalized understanding.

## 2023-09-20 ENCOUNTER — TELEPHONE (OUTPATIENT)
Dept: HEMATOLOGY ONCOLOGY | Facility: CLINIC | Age: 38
End: 2023-09-20

## 2023-09-20 NOTE — TELEPHONE ENCOUNTER
Patient called in to set up a new patient appointment, we reviewed and answered the questionnaire and when it came time to schedule the patient denied due to everything being 2+ weeks out. The patient states she will think about it and call us back.  Closing referral.

## 2023-09-26 ENCOUNTER — HOSPITAL ENCOUNTER (EMERGENCY)
Facility: HOSPITAL | Age: 38
Discharge: HOME | End: 2023-09-27
Attending: EMERGENCY MEDICINE | Admitting: EMERGENCY MEDICINE
Payer: COMMERCIAL

## 2023-09-26 DIAGNOSIS — R00.2 PALPITATIONS: Primary | ICD-10-CM

## 2023-09-26 PROCEDURE — 99285 EMERGENCY DEPT VISIT HI MDM: CPT

## 2023-09-26 PROCEDURE — 93005 ELECTROCARDIOGRAM TRACING: CPT

## 2023-09-27 VITALS
HEART RATE: 94 BPM | SYSTOLIC BLOOD PRESSURE: 139 MMHG | DIASTOLIC BLOOD PRESSURE: 92 MMHG | OXYGEN SATURATION: 99 % | TEMPERATURE: 98 F | RESPIRATION RATE: 15 BRPM

## 2023-09-27 LAB
ATRIAL RATE: 86 BPM
CARDIAC TROPONIN I PNL SERPL HS: <2 NG/L
P AXIS: 59 DEGREES
PR INTERVAL: 148 MS
QRS AXIS: 43 DEGREES
QRSD INTERVAL: 94 MS
QT INTERVAL: 354 MS
QTC INTERVAL: 423 MS
T WAVE AXIS: -2 DEGREES
VENTRICULAR RATE: 86 BPM

## 2023-09-27 PROCEDURE — 99284 EMERGENCY DEPT VISIT MOD MDM: CPT | Performed by: EMERGENCY MEDICINE

## 2023-09-27 PROCEDURE — 93010 ELECTROCARDIOGRAM REPORT: CPT | Performed by: INTERNAL MEDICINE

## 2023-09-27 PROCEDURE — 36415 COLL VENOUS BLD VENIPUNCTURE: CPT | Performed by: EMERGENCY MEDICINE

## 2023-09-27 PROCEDURE — 84484 ASSAY OF TROPONIN QUANT: CPT | Performed by: EMERGENCY MEDICINE

## 2023-09-27 RX ORDER — LORAZEPAM 1 MG/1
1 TABLET ORAL ONCE
Status: COMPLETED | OUTPATIENT
Start: 2023-09-27 | End: 2023-09-27

## 2023-09-27 RX ADMIN — LORAZEPAM 1 MG: 1 TABLET ORAL at 00:26

## 2023-09-27 NOTE — ED PROVIDER NOTES
History  Chief Complaint   Patient presents with   • Palpitations     Reports palpitations, Denies CP/ SOB upon arrival. took klonopin today@ 0000. Reports made her "feel out of it" Hx of anxiety. • Anxiety     42-year-old female presents for evaluation. Patient reports history of anxiety, she believes is related to her own health started after a urgent cholecystectomy. Patient reports she has been on and off a few medications, she states previously suffering from insomnia however this is now being treated. She notes medication change today and took her first dose of Klonopin around noon. Patient believes she may have had a paradoxical reaction as this caused her to feel more anxious and experience sensation of palpitations. Patient denies chest pain or dyspnea. Patient denies other significant changes in her daily habits. Patient is wondering if she can take her previously prescribed Ativan now seeing if she took the Klonopin around noon today. Prior to Admission Medications   Prescriptions Last Dose Informant Patient Reported? Taking?    ALPRAZolam (XANAX) 0.5 mg tablet   Yes No   Multiple Vitamin (MULTI-DAY PO)  Self Yes No   Sig: Take by mouth   Omega-3 Fatty Acids (FISH OIL PO)   Yes No   Sig: Take 2 g by mouth   QUEtiapine (SEROquel) 25 mg tablet   No No   Sig: Take 1 tablet (25 mg total) by mouth daily at bedtime   Patient not taking: Reported on 8/17/2023   benzonatate (TESSALON) 200 MG capsule   No No   Sig: Take 1 capsule (200 mg total) by mouth 3 (three) times a day as needed for cough   Patient not taking: Reported on 7/14/2023   cholecalciferol (VITAMIN D3) 25 mcg (1,000 units) tablet  Self Yes No   Patient not taking: Reported on 4/2/2023   escitalopram (LEXAPRO) 5 mg tablet   No No   Sig: Take 1 tablet (5 mg total) by mouth daily   Patient not taking: Reported on 8/17/2023   ferrous sulfate 325 (65 Fe) mg tablet  Self Yes No   Sig: Take 325 mg by mouth daily with breakfast   Patient not taking: Reported on 3/11/2022   fluticasone (FLONASE) 50 mcg/act nasal spray   No No   Si sprays into each nostril daily   hydrOXYzine HCL (ATARAX) 25 mg tablet   No No   Sig: Take 1 tablet (25 mg total) by mouth every 6 (six) hours as needed for anxiety   Patient not taking: Reported on 2023   ibuprofen (MOTRIN) 800 mg tablet   Yes No   iron polysaccharides (FERREX) 150 mg capsule  Self Yes No   Sig: Take 150 mg by mouth daily   loratadine (CLARITIN) 10 mg tablet   No No   Sig: Take 1 tablet (10 mg total) by mouth daily   omeprazole (PriLOSEC) 20 mg delayed release capsule   No No   Sig: Take 1 capsule (20 mg total) by mouth daily   Patient not taking: Reported on 2023   pantoprazole (PROTONIX) 40 mg tablet   Yes No      Facility-Administered Medications: None       Past Medical History:   Diagnosis Date   • Anxiety    • Depression        Past Surgical History:   Procedure Laterality Date   •  SECTION     • DILATION AND CURETTAGE, DIAGNOSTIC / THERAPEUTIC         History reviewed. No pertinent family history. I have reviewed and agree with the history as documented. E-Cigarette/Vaping   • E-Cigarette Use Never User      E-Cigarette/Vaping Substances     Social History     Tobacco Use   • Smoking status: Every Day     Packs/day: 0.25     Years: 20.00     Total pack years: 5.00     Types: Cigarettes   • Smokeless tobacco: Never   Vaping Use   • Vaping Use: Never used   Substance Use Topics   • Alcohol use: Never   • Drug use: Not Currently     Types: Marijuana     Comment: Medical dennis       Review of Systems   Constitutional: Negative for activity change and appetite change. Respiratory: Negative for shortness of breath. Cardiovascular: Positive for palpitations. Negative for chest pain. Gastrointestinal: Negative for abdominal pain. Psychiatric/Behavioral: The patient is nervous/anxious. All other systems reviewed and are negative.       Physical Exam  Physical Exam  Vitals reviewed. Constitutional:       General: She is in acute distress (Anxious). Appearance: Normal appearance. She is not toxic-appearing. HENT:      Head: Normocephalic and atraumatic. Right Ear: External ear normal.      Left Ear: External ear normal.      Nose: Nose normal.   Eyes:      General:         Right eye: No discharge. Left eye: No discharge. Cardiovascular:      Rate and Rhythm: Normal rate and regular rhythm. Pulmonary:      Effort: Pulmonary effort is normal.   Musculoskeletal:         General: No deformity or signs of injury. Right lower leg: No edema. Left lower leg: No edema. Skin:     General: Skin is warm. Coloration: Skin is not jaundiced or pale. Neurological:      General: No focal deficit present. Mental Status: She is alert.       Gait: Gait normal.         Vital Signs  ED Triage Vitals [09/26/23 2352]   Temperature Pulse Respirations Blood Pressure SpO2   98 °F (36.7 °C) 95 15 146/98 99 %      Temp Source Heart Rate Source Patient Position - Orthostatic VS BP Location FiO2 (%)   Temporal Monitor Sitting Left arm --      Pain Score       --           Vitals:    09/26/23 2352 09/27/23 0000   BP: 146/98 139/92   Pulse: 95 94   Patient Position - Orthostatic VS: Sitting Lying         Visual Acuity      ED Medications  Medications   LORazepam (ATIVAN) tablet 1 mg (1 mg Oral Given 9/27/23 0026)       Diagnostic Studies  Results Reviewed     Procedure Component Value Units Date/Time    HS Troponin 0hr (reflex protocol) [[id number]]  (Normal) Collected: 09/27/23 0025    Lab Status: Final result Specimen: Blood from Arm, Right Updated: 09/27/23 0120     hs TnI 0hr <2 ng/L                  No orders to display              Procedures  Procedures         ED Course  ED Course as of 09/27/23 1543   Wed Sep 27, 2023   0116 Procedure Note: EKG  Date/Time: 09/27/23 1:16 AM   Interpreted by: Prashanth Mera  Indications / Diagnosis: palpitations  ECG reviewed by me, the ED Provider: yes   The EKG demonstrates:  Rhythm: normal sinus  Intervals: normal intervals  Axis: normal axis  QRS/Blocks:  incomplete RBBB  ST Changes: No STD/VALENTE. Isolated new TWI to III compared to previous. HEART Risk Score    Flowsheet Row Most Recent Value   Heart Score Risk Calculator    History 0 Filed at: 09/27/2023 0148   ECG 1 Filed at: 09/27/2023 0148   Age 0 Filed at: 09/27/2023 0148   Risk Factors 1 Filed at: 09/27/2023 0148   Troponin 0 Filed at: 09/27/2023 0148   HEART Score 2 Filed at: 09/27/2023 0148                PERC Rule for PE    Flowsheet Row Most Recent Value   PERC Rule for PE    Age >=50 0 Filed at: 09/27/2023 0130   HR >=100 0 Filed at: 09/27/2023 0130   O2 Sat on room air < 95% 0 Filed at: 09/27/2023 0130   History of PE or DVT 0 Filed at: 09/27/2023 0130   Recent trauma or surgery 0 Filed at: 09/27/2023 0130   Hemoptysis 0 Filed at: 09/27/2023 0130   Exogenous estrogen 0 Filed at: 09/27/2023 0130   Unilateral leg swelling 0 Filed at: 09/27/2023 0130   PERC Rule for PE Results 0 Filed at: 09/27/2023 0130                            Medical Decision Making  59-year-old female presents for evaluation of anxiety, also for guidance on taking medication. Patient had an EKG that was performed initially, on arrival she does have a new T wave inversion however this is isolated without reciprocal changes. Given this, will do a straight stick troponin and anticipate this to be negative. Symptoms have been ongoing for 12 hours now due to perform a delta Trope to assess for ACS. Patient is PERC negative for PE. Symptoms are likely due to patient's ongoing anxiety. We will give patient a dose of Ativan, she is advised she can discuss with her ordering provider however Klonopin may not be the right medication for her. For the time being she may use her previously prescribed Ativan as prescribed.     Amount and/or Complexity of Data Reviewed  Labs: ordered. Risk  Prescription drug management. Disposition  Final diagnoses:   Palpitations     Time reflects when diagnosis was documented in both MDM as applicable and the Disposition within this note     Time User Action Codes Description Comment    9/27/2023  1:15 AM Hyacinth Segovia Add [R00.2] Palpitations       ED Disposition     ED Disposition   Discharge    Condition   Stable    Date/Time   Wed Sep 27, 2023  1:48 AM    Comment   Mary Kimbrough discharge to home/self care. Follow-up Information     Follow up With Specialties Details Why Contact Info Additional Lauren Mae MD [facility] Medicine Schedule an appointment as soon as possible for a visit  To make appointment for reevaluation in 3-5 days. [address] [facility] Schedule an appointment as soon as possible for a visit  To establish care.  [address] [facility] [address]  [address] [facility] Cardiology [address] [facility], [address] [place], [place], [address]          Discharge Medication List as of 9/27/2023  1:48 AM      CONTINUE these medications which have NOT CHANGED    Details   ALPRAZolam Candance Cummings) 0.5 mg tablet Starting Fri 11/19/2021, Historical Med      benzonatate (TESSALON) 200 MG capsule Take 1 capsule (200 mg total) by mouth 3 (three) times a day as needed for cough, Starting Sun 4/30/2023, Normal      cholecalciferol (VITAMIN D3) 25 mcg (1,000 units) tablet Starting Mon 4/25/2022, Historical Med      escitalopram (LEXAPRO) 5 mg tablet Take 1 tablet (5 mg total) by mouth daily, Starting Thu 7/27/2023, Normal      ferrous sulfate 325 (65 Fe) mg tablet Take 325 mg by mouth daily with breakfast, Historical Med      fluticasone (FLONASE) 50 mcg/act nasal spray 2 sprays into each nostril daily, Starting Sun 4/30/2023, Normal hydrOXYzine HCL (ATARAX) 25 mg tablet Take 1 tablet (25 mg total) by mouth every 6 (six) hours as needed for anxiety, Starting Sun 7/16/2023, Normal      ibuprofen (MOTRIN) 800 mg tablet Starting Mon 6/26/2023, Historical Med      iron polysaccharides (FERREX) 150 mg capsule Take 150 mg by mouth daily, Starting Tue 7/20/2021, Until Wed 7/20/2022, Historical Med      loratadine (CLARITIN) 10 mg tablet Take 1 tablet (10 mg total) by mouth daily, Starting Sun 4/30/2023, Normal      Multiple Vitamin (MULTI-DAY PO) Take by mouth, Historical Med      Omega-3 Fatty Acids (FISH OIL PO) Take 2 g by mouth, Historical Med      omeprazole (PriLOSEC) 20 mg delayed release capsule Take 1 capsule (20 mg total) by mouth daily, Starting Sun 4/2/2023, Normal      pantoprazole (PROTONIX) 40 mg tablet Starting Wed 4/5/2023, Historical Med      QUEtiapine (SEROquel) 25 mg tablet Take 1 tablet (25 mg total) by mouth daily at bedtime, Starting Tue 8/1/2023, Normal                 PDMP Review       Value Time User    PDMP Reviewed  Yes 8/17/2023 10:48 AM Aditya Rizvi DO          ED Provider  Electronically Signed by           Ana Paula Cook DO  09/27/23 4029

## 2023-09-27 NOTE — DISCHARGE INSTRUCTIONS
You have been seen for palpitations. Return to the emergency department if you develop worsening chest discomfort, dyspnea or any other symptoms of concern. Please follow up with your PCP by calling the number provided.

## 2023-09-27 NOTE — ED NOTES
Pt refusing to be hooked up to any monitoring equipment at this time. Dr. Elsa Kaur made aware.       Bonilla Hernandez RN  09/27/23 7160

## 2023-09-27 NOTE — ED NOTES
Note delayed d/t pt care. Pt asking for ECG leads and cardiac leads to be removed, after receiving ECG. States they are "making her feel weird" This RN explained pt needs to stay hooked up to tele monitor. ECG leads removed. Pt agreeable to remain on cardiac monitor.         Andreea Del Angel RN  09/27/23 5499

## 2023-10-02 ENCOUNTER — OFFICE VISIT (OUTPATIENT)
Dept: CARDIOLOGY CLINIC | Facility: CLINIC | Age: 38
End: 2023-10-02
Payer: COMMERCIAL

## 2023-10-02 VITALS
DIASTOLIC BLOOD PRESSURE: 100 MMHG | WEIGHT: 136 LBS | HEART RATE: 104 BPM | HEIGHT: 64 IN | SYSTOLIC BLOOD PRESSURE: 130 MMHG | BODY MASS INDEX: 23.22 KG/M2

## 2023-10-02 DIAGNOSIS — R03.0 ELEVATED BP WITHOUT DIAGNOSIS OF HYPERTENSION: ICD-10-CM

## 2023-10-02 DIAGNOSIS — Z72.0 CURRENT TOBACCO USE: ICD-10-CM

## 2023-10-02 DIAGNOSIS — R00.2 PALPITATIONS: Primary | ICD-10-CM

## 2023-10-02 DIAGNOSIS — E61.1 IRON DEFICIENCY: ICD-10-CM

## 2023-10-02 DIAGNOSIS — I45.10 INCOMPLETE RBBB: ICD-10-CM

## 2023-10-02 PROCEDURE — 99244 OFF/OP CNSLTJ NEW/EST MOD 40: CPT | Performed by: INTERNAL MEDICINE

## 2023-10-02 RX ORDER — LORAZEPAM 1 MG/1
TABLET ORAL
COMMUNITY
Start: 2023-09-15

## 2023-10-02 RX ORDER — VENLAFAXINE HYDROCHLORIDE 37.5 MG/1
CAPSULE, EXTENDED RELEASE ORAL
COMMUNITY
Start: 2023-09-15

## 2023-10-02 NOTE — PROGRESS NOTES
Cardiology Consultation     Nuzhat Seymour  [id number]  1985  [facility] [place] [address] [address] [facility] [id number]      1. Palpitations  Ambulatory Referral to Cardiology    CBC and differential    TSH, 3rd generation with Free T4 reflex    Comprehensive metabolic panel    AMB event recorder    Echo complete w/ contrast if indicated      2. Current tobacco use        3. Elevated BP without diagnosis of hypertension        4. Iron deficiency        5. Incomplete RBBB            Discussion/Summary:  1. Intermittent palpitations  2. Current tobacco use  3. Iron deficiency  4. Elevated blood pressure without diagnosis hypertension  5. Incomplete right bundle branch block ECG    -Patient with incomplete right bundle branch pattern on ECG similar to as far back as May 2022  -We will check transthoracic echocardiogram to evaluate overall cardiac structure and function  -We will check 2-week ambulatory event monitor to evaluate overall ectopic burden  -Patient will follow with hematology for iron deficiency  -Patient will monitor home blood pressure readings and let our office know if significantly elevated greater than 130/80 mmHg for initiation of medical therapy  -Orthostatic vital signs in office today show any 120/90 mmHg rate 90 bpm, sitting blood pressure 130/100 mmHg, heart rate 104 bpm, standing 120/90 mmHg heart rate 98 bpm.  -We will see patient in 1 month or sooner if necessary once testing is completed  -We will check laboratory studies including TSH, CBC, CMP  -Patient counseled on the importance of dietary lifestyle modifications along with tobacco cessation  -Patient counseled if she were to have any warning or alarm type symptoms she is to seek emergency medical care immediately.       History of Present Illness:  -Patient is a 42-year-old female with history of documented iron deficiency, elevated blood pressure without diagnosed hypertension, former medical marijuana use, intermittent tobacco use, anxiety and depression who also had recent cholecystectomy scheduled 6/26/2023 and presents to the office today after emergency room evaluation for intermittent chest pain and palpitation. Patient notes more specifically it is for palpitations that cause for concern. She also discovered recently through chart review that she has a borderline ECG with incomplete right bundle branch block that she was unaware of. Currently in the office today she denies any chest pain, palpitations, lightness or dizziness, loss of consciousness, shortness of breath lower extremity edema, orthopnea or bendopnea. She notes she does get her symptoms of palpitations several times per week but denies any exertional symptoms and overall notes that she monitors her blood pressures at home blood pressures are very well controlled in the 120/80 mmHg range.  -Patient currently smokes 5 to 7 cigarettes/day, denies alcohol use and has no recent medical marijuana use. -Patient denies any known family history of heart disease.     Patient Active Problem List   Diagnosis   • Thrombosed external hemorrhoid   • Abdominal pain, acute   • Anxiety   • Elevated BP without diagnosis of hypertension   • Insomnia   • Tobacco use disorder   • Acute vaginitis   • Adnexal mass   • Displacement of cervical intervertebral disc   • Hypertension     Past Medical History:   Diagnosis Date   • Anxiety    • Depression      Social History     Socioeconomic History   • Marital status: Single     Spouse name: Not on file   • Number of children: Not on file   • Years of education: Not on file   • Highest education level: Not on file   Occupational History   • Not on file   Tobacco Use   • Smoking status: Every Day     Packs/day: 0.25     Years: 20.00     Total pack years: 5.00     Types: Cigarettes   • Smokeless tobacco: Never Vaping Use   • Vaping Use: Never used   Substance and Sexual Activity   • Alcohol use: Never   • Drug use: Not Currently     Types: Marijuana     Comment: Medical dennis   • Sexual activity: Not on file   Other Topics Concern   • Not on file   Social History Narrative   • Not on file     Social Determinants of Health     Financial Resource Strain: Not on file   Food Insecurity: Not on file   Transportation Needs: Not on file   Physical Activity: Not on file   Stress: Not on file   Social Connections: Not on file   Intimate Partner Violence: Not on file   Housing Stability: Not on file      History reviewed. No pertinent family history.   Past Surgical History:   Procedure Laterality Date   •  SECTION     • DILATION AND CURETTAGE, DIAGNOSTIC / THERAPEUTIC         Current Outpatient Medications:   •  ALPRAZolam (XANAX) 0.5 mg tablet, , Disp: , Rfl:   •  ferrous sulfate 325 (65 Fe) mg tablet, Take 325 mg by mouth daily with breakfast, Disp: , Rfl:   •  ibuprofen (MOTRIN) 800 mg tablet, , Disp: , Rfl:   •  LORazepam (ATIVAN) 1 mg tablet, , Disp: , Rfl:   •  Multiple Vitamin (MULTI-DAY PO), Take by mouth, Disp: , Rfl:   •  Omega-3 Fatty Acids (FISH OIL PO), Take 2 g by mouth, Disp: , Rfl:   •  venlafaxine (EFFEXOR-XR) 37.5 mg 24 hr capsule, , Disp: , Rfl:   •  benzonatate (TESSALON) 200 MG capsule, Take 1 capsule (200 mg total) by mouth 3 (three) times a day as needed for cough (Patient not taking: Reported on 2023), Disp: 20 capsule, Rfl: 0  •  cholecalciferol (VITAMIN D3) 25 mcg (1,000 units) tablet, , Disp: , Rfl:   •  escitalopram (LEXAPRO) 5 mg tablet, Take 1 tablet (5 mg total) by mouth daily (Patient not taking: Reported on 2023), Disp: 30 tablet, Rfl: 1  •  fluticasone (FLONASE) 50 mcg/act nasal spray, 2 sprays into each nostril daily (Patient not taking: Reported on 10/2/2023), Disp: 1 g, Rfl: 0  •  hydrOXYzine HCL (ATARAX) 25 mg tablet, Take 1 tablet (25 mg total) by mouth every 6 (six) hours as needed for anxiety (Patient not taking: Reported on 8/17/2023), Disp: 40 tablet, Rfl: 0  •  iron polysaccharides (FERREX) 150 mg capsule, Take 150 mg by mouth daily, Disp: , Rfl:   •  loratadine (CLARITIN) 10 mg tablet, Take 1 tablet (10 mg total) by mouth daily (Patient not taking: Reported on 10/2/2023), Disp: 30 tablet, Rfl: 0  •  omeprazole (PriLOSEC) 20 mg delayed release capsule, Take 1 capsule (20 mg total) by mouth daily (Patient not taking: Reported on 4/30/2023), Disp: 30 capsule, Rfl: 0  •  pantoprazole (PROTONIX) 40 mg tablet, , Disp: , Rfl:   •  QUEtiapine (SEROquel) 25 mg tablet, Take 1 tablet (25 mg total) by mouth daily at bedtime (Patient not taking: Reported on 8/17/2023), Disp: 30 tablet, Rfl: 1  Allergies   Allergen Reactions   • Metronidazole GI Intolerance   • Azithromycin Palpitations   • Tramadol Palpitations     Pt felt horrible          Labs:  Admission on 09/26/2023, Discharged on 09/27/2023   Component Date Value   • Ventricular Rate 09/26/2023 86    • Atrial Rate 09/26/2023 86    • SD Interval 09/26/2023 148    • QRSD Interval 09/26/2023 94    • QT Interval 09/26/2023 354    • QTC Interval 09/26/2023 423    • P Axis 09/26/2023 59    • QRS Axis 09/26/2023 43    • T Wave Axis 09/26/2023 -2    • hs TnI 0hr 09/27/2023 <2    Admission on 08/17/2023, Discharged on 08/17/2023   Component Date Value   • Sodium 08/17/2023 138    • Potassium 08/17/2023 4.3    • Chloride 08/17/2023 106    • CO2 08/17/2023 24    • ANION GAP 08/17/2023 8    • BUN 08/17/2023 7    • Creatinine 08/17/2023 0.58 (L)    • Glucose 08/17/2023 119    • Calcium 08/17/2023 9.3    • eGFR 08/17/2023 115    • Magnesium 08/17/2023 2.2    • TSH 3RD GENERATON 08/17/2023 0.538    • Free T4 08/17/2023 0.77         Imaging: No results found. Review of Systems:  Review of Systems   Constitutional: Negative for chills, diaphoresis, fatigue and fever. HENT: Negative for trouble swallowing and voice change.     Eyes: Negative for pain and redness. Respiratory: Negative for shortness of breath and wheezing. Cardiovascular: Negative for chest pain, palpitations and leg swelling. Gastrointestinal: Negative for abdominal pain, constipation, diarrhea, nausea and vomiting. Genitourinary: Negative for dysuria. Musculoskeletal: Negative for neck pain and neck stiffness. Skin: Negative for rash. Neurological: Negative for dizziness, syncope, light-headedness and headaches. Psychiatric/Behavioral: Negative for agitation and confusion. All other systems reviewed and are negative. Vitals:    10/02/23 1256   BP: 130/100   Pulse: 104   Weight: 61.7 kg (136 lb)   Height: 5' 3.5" (1.613 m)     Vitals:    10/02/23 1256   Weight: 61.7 kg (136 lb)     Height: 5' 3.5" (161.3 cm)     Physical Exam:  Physical Exam  Vitals reviewed. Constitutional:       General: She is not in acute distress. Appearance: Normal appearance. She is not diaphoretic. HENT:      Head: Normocephalic and atraumatic. Eyes:      General:         Right eye: No discharge. Left eye: No discharge. Neck:      Comments: Trachea midline, no JVD  Cardiovascular:      Rate and Rhythm: Normal rate and regular rhythm. Heart sounds: No friction rub. Pulmonary:      Effort: Pulmonary effort is normal. No respiratory distress. Breath sounds: No wheezing. Chest:      Chest wall: No tenderness. Abdominal:      General: Bowel sounds are normal.      Palpations: Abdomen is soft. Tenderness: There is no abdominal tenderness. There is no rebound. Musculoskeletal:      Right lower leg: No edema. Left lower leg: No edema. Skin:     General: Skin is warm and dry. Neurological:      Mental Status: She is alert. Comments: Awake, alert, able to answer questions appropriately, able to move extremities bilaterally.    Psychiatric:         Mood and Affect: Mood normal.         Behavior: Behavior normal.

## 2023-10-04 ENCOUNTER — OFFICE VISIT (OUTPATIENT)
Dept: URGENT CARE | Facility: CLINIC | Age: 38
End: 2023-10-04
Payer: COMMERCIAL

## 2023-10-04 VITALS
RESPIRATION RATE: 20 BRPM | OXYGEN SATURATION: 99 % | SYSTOLIC BLOOD PRESSURE: 138 MMHG | TEMPERATURE: 98 F | HEART RATE: 88 BPM | DIASTOLIC BLOOD PRESSURE: 97 MMHG

## 2023-10-04 DIAGNOSIS — K08.89 PAIN, DENTAL: Primary | ICD-10-CM

## 2023-10-04 DIAGNOSIS — K02.9 DENTAL CAVITIES: ICD-10-CM

## 2023-10-04 PROCEDURE — 99214 OFFICE O/P EST MOD 30 MIN: CPT | Performed by: NURSE PRACTITIONER

## 2023-10-04 RX ORDER — AMOXICILLIN 500 MG/1
500 CAPSULE ORAL EVERY 8 HOURS SCHEDULED
Qty: 21 CAPSULE | Refills: 0 | Status: SHIPPED | OUTPATIENT
Start: 2023-10-04 | End: 2023-10-11

## 2023-10-04 RX ORDER — CHLORHEXIDINE GLUCONATE ORAL RINSE 1.2 MG/ML
15 SOLUTION DENTAL 2 TIMES DAILY
Qty: 473 ML | Refills: 0 | Status: SHIPPED | OUTPATIENT
Start: 2023-10-04

## 2023-10-04 NOTE — PATIENT INSTRUCTIONS
You have been prescribed amoxicillin for possible infection and peridex for dental bacteria.   Take tylenol and motrin as needed for pain  Call your dentist for follow up   Follow up with your PCP in3-5 days  Go to the ED if symptoms worsen
<--- Click to Launch ICDx for PreOp, PostOp and Procedure

## 2023-10-04 NOTE — PROGRESS NOTES
[facility]        NAME: Rebeca Gomez is a 34 y.o. female  : 1985    MRN: [id number]  DATE: 2023  TIME: 8:51 PM    Assessment and Plan   Pain, dental [K08.89]  1. Pain, dental  amoxicillin (AMOXIL) 500 mg capsule    chlorhexidine (PERIDEX) 0.12 % solution      2. Dental cavities  amoxicillin (AMOXIL) 500 mg capsule    chlorhexidine (PERIDEX) 0.12 % solution            Patient Instructions       Follow up with PCP in 3-5 days. Proceed to  ER if symptoms worsen. You have been prescribed amoxicillin for possible infection and peridex for dental bacteria. Take tylenol and motrin as needed for pain  Call your dentist for follow up   Follow up with your PCP in3-5 days  Go to the ED if symptoms worsen        Chief Complaint     Chief Complaint   Patient presents with   • Dental Pain         History of Present Illness       This is a 34year old female who states has had dental pain on the right lower jaw for a few days. She denies abscess, fevers, chills. States taking tylenol and motrin w/o relief. Denies able to get into see her dentist at this time. Denies pregnancy. Review of Systems   Review of Systems   Constitutional: Negative. HENT: Positive for dental problem. Eyes: Negative. Respiratory: Negative. Cardiovascular: Negative. Gastrointestinal: Negative. Endocrine: Negative. Genitourinary: Negative. Musculoskeletal: Negative. Skin: Negative. Allergic/Immunologic: Negative. Neurological: Negative. Hematological: Negative. Psychiatric/Behavioral: Negative.           Current Medications       Current Outpatient Medications:   •  amoxicillin (AMOXIL) 500 mg capsule, Take 1 capsule (500 mg total) by mouth every 8 (eight) hours for 7 days, Disp: 21 capsule, Rfl: 0  •  chlorhexidine (PERIDEX) 0.12 % solution, Apply 15 mL to the mouth or throat 2 (two) times a day, Disp: 473 mL, Rfl: 0  •  ALPRAZolam (XANAX) 0.5 mg tablet, , Disp: , Rfl:   • benzonatate (TESSALON) 200 MG capsule, Take 1 capsule (200 mg total) by mouth 3 (three) times a day as needed for cough (Patient not taking: Reported on 7/14/2023), Disp: 20 capsule, Rfl: 0  •  cholecalciferol (VITAMIN D3) 25 mcg (1,000 units) tablet, , Disp: , Rfl:   •  escitalopram (LEXAPRO) 5 mg tablet, Take 1 tablet (5 mg total) by mouth daily (Patient not taking: Reported on 8/17/2023), Disp: 30 tablet, Rfl: 1  •  ferrous sulfate 325 (65 Fe) mg tablet, Take 325 mg by mouth daily with breakfast, Disp: , Rfl:   •  fluticasone (FLONASE) 50 mcg/act nasal spray, 2 sprays into each nostril daily (Patient not taking: Reported on 10/2/2023), Disp: 1 g, Rfl: 0  •  hydrOXYzine HCL (ATARAX) 25 mg tablet, Take 1 tablet (25 mg total) by mouth every 6 (six) hours as needed for anxiety (Patient not taking: Reported on 8/17/2023), Disp: 40 tablet, Rfl: 0  •  ibuprofen (MOTRIN) 800 mg tablet, , Disp: , Rfl:   •  iron polysaccharides (FERREX) 150 mg capsule, Take 150 mg by mouth daily, Disp: , Rfl:   •  loratadine (CLARITIN) 10 mg tablet, Take 1 tablet (10 mg total) by mouth daily (Patient not taking: Reported on 10/2/2023), Disp: 30 tablet, Rfl: 0  •  LORazepam (ATIVAN) 1 mg tablet, , Disp: , Rfl:   •  Multiple Vitamin (MULTI-DAY PO), Take by mouth, Disp: , Rfl:   •  Omega-3 Fatty Acids (FISH OIL PO), Take 2 g by mouth, Disp: , Rfl:   •  omeprazole (PriLOSEC) 20 mg delayed release capsule, Take 1 capsule (20 mg total) by mouth daily (Patient not taking: Reported on 4/30/2023), Disp: 30 capsule, Rfl: 0  •  pantoprazole (PROTONIX) 40 mg tablet, , Disp: , Rfl:   •  QUEtiapine (SEROquel) 25 mg tablet, Take 1 tablet (25 mg total) by mouth daily at bedtime (Patient not taking: Reported on 8/17/2023), Disp: 30 tablet, Rfl: 1  •  venlafaxine (EFFEXOR-XR) 37.5 mg 24 hr capsule, , Disp: , Rfl:     Current Allergies     Allergies as of 10/04/2023 - Reviewed 10/04/2023   Allergen Reaction Noted   • Metronidazole GI Intolerance 03/09/2018 • Azithromycin Palpitations 2017   • Tramadol Palpitations 2016            The following portions of the patient's history were reviewed and updated as appropriate: allergies, current medications, past family history, past medical history, past social history, past surgical history and problem list.     Past Medical History:   Diagnosis Date   • Anxiety    • Depression        Past Surgical History:   Procedure Laterality Date   •  SECTION     • DILATION AND CURETTAGE, DIAGNOSTIC / THERAPEUTIC         History reviewed. No pertinent family history. Medications have been verified. Objective   /97   Pulse 88   Temp 98 °F (36.7 °C)   Resp 20   SpO2 99%   No LMP recorded. Physical Exam     Physical Exam  Vitals and nursing note reviewed. Constitutional:       General: She is not in acute distress. Appearance: Normal appearance. She is normal weight. She is not ill-appearing, toxic-appearing or diaphoretic. HENT:      Head: Normocephalic and atraumatic. Nose: Nose normal.      Mouth/Throat:      Mouth: Mucous membranes are moist.      Pharynx: No posterior oropharyngeal erythema. Eyes:      Extraocular Movements: Extraocular movements intact. Cardiovascular:      Rate and Rhythm: Normal rate and regular rhythm. Pulses: Normal pulses. Heart sounds: Normal heart sounds. Pulmonary:      Effort: Pulmonary effort is normal.      Breath sounds: Normal breath sounds. Musculoskeletal:         General: Normal range of motion. Cervical back: Normal range of motion and neck supple. Skin:     General: Skin is warm and dry. Capillary Refill: Capillary refill takes less than 2 seconds. Neurological:      General: No focal deficit present. Mental Status: She is alert and oriented to person, place, and time. Psychiatric:         Mood and Affect: Mood normal.         Behavior: Behavior normal.         Thought Content:  Thought content normal.         Judgment: Judgment normal.

## 2023-10-13 ENCOUNTER — HOSPITAL ENCOUNTER (OUTPATIENT)
Dept: NON INVASIVE DIAGNOSTICS | Facility: CLINIC | Age: 38
Discharge: HOME | End: 2023-10-13
Payer: COMMERCIAL

## 2023-10-13 VITALS
SYSTOLIC BLOOD PRESSURE: 134 MMHG | DIASTOLIC BLOOD PRESSURE: 64 MMHG | WEIGHT: 136 LBS | HEIGHT: 64 IN | BODY MASS INDEX: 23.22 KG/M2 | HEART RATE: 71 BPM

## 2023-10-13 DIAGNOSIS — R00.2 PALPITATIONS: ICD-10-CM

## 2023-10-13 LAB
AORTIC ROOT: 3 CM
APICAL FOUR CHAMBER EJECTION FRACTION: 65 %
AV LVOT MEAN GRADIENT: 3 MMHG
AV LVOT PEAK GRADIENT: 6 MMHG
DOP CALC LVOT PEAK VEL VTI: 25.85 CM
DOP CALC LVOT PEAK VEL: 1.18 M/S
E WAVE DECELERATION TIME: 254 MS
E/A RATIO: 1.14
FRACTIONAL SHORTENING: 29 (ref 28–44)
INTERVENTRICULAR SEPTUM IN DIASTOLE (PARASTERNAL SHORT AXIS VIEW): 0.8 CM
INTERVENTRICULAR SEPTUM: 0.8 CM (ref 0.6–1.1)
LAAS-AP2: 15.3 CM2
LAAS-AP4: 11.9 CM2
LEFT ATRIUM SIZE: 3 CM
LEFT ATRIUM VOLUME (MOD BIPLANE): 35 ML
LEFT ATRIUM VOLUME INDEX (MOD BIPLANE): 21.2 ML/M2
LEFT INTERNAL DIMENSION IN SYSTOLE: 3.2 CM (ref 2.1–4)
LEFT VENTRICULAR INTERNAL DIMENSION IN DIASTOLE: 4.5 CM (ref 3.5–6)
LEFT VENTRICULAR POSTERIOR WALL IN END DIASTOLE: 0.8 CM
LEFT VENTRICULAR STROKE VOLUME: 52 ML
LVSV (TEICH): 52 ML
MV E'TISSUE VEL-SEP: 15 CM/S
MV PEAK A VEL: 0.74 M/S
MV PEAK E VEL: 84 CM/S
MV STENOSIS PRESSURE HALF TIME: 74 MS
MV VALVE AREA P 1/2 METHOD: 2.97
RIGHT ATRIUM AREA SYSTOLE A4C: 9.7 CM2
RIGHT VENTRICLE ID DIMENSION: 2.4 CM
SL CV LEFT ATRIUM LENGTH A2C: 4.8 CM
SL CV LV EF: 65
SL CV PED ECHO LEFT VENTRICLE DIASTOLIC VOLUME (MOD BIPLANE) 2D: 94 ML
SL CV PED ECHO LEFT VENTRICLE SYSTOLIC VOLUME (MOD BIPLANE) 2D: 42 ML
TR MAX PG: 19 MMHG
TR PEAK VELOCITY: 2.2 M/S
TRICUSPID ANNULAR PLANE SYSTOLIC EXCURSION: 2.4 CM
TRICUSPID VALVE PEAK REGURGITATION VELOCITY: 2.16 M/S

## 2023-10-13 PROCEDURE — 93306 TTE W/DOPPLER COMPLETE: CPT | Performed by: INTERNAL MEDICINE

## 2023-10-13 PROCEDURE — 93306 TTE W/DOPPLER COMPLETE: CPT

## 2023-10-15 ENCOUNTER — OFFICE VISIT (OUTPATIENT)
Dept: URGENT CARE | Facility: CLINIC | Age: 38
End: 2023-10-15
Payer: COMMERCIAL

## 2023-10-15 VITALS
OXYGEN SATURATION: 99 % | DIASTOLIC BLOOD PRESSURE: 93 MMHG | RESPIRATION RATE: 18 BRPM | TEMPERATURE: 98.6 F | HEART RATE: 93 BPM | SYSTOLIC BLOOD PRESSURE: 162 MMHG

## 2023-10-15 DIAGNOSIS — F41.9 ANXIETY: ICD-10-CM

## 2023-10-15 DIAGNOSIS — R42 DIZZINESS AND GIDDINESS: Primary | ICD-10-CM

## 2023-10-15 LAB — GLUCOSE SERPL-MCNC: 124 MG/DL (ref 65–140)

## 2023-10-15 PROCEDURE — 99213 OFFICE O/P EST LOW 20 MIN: CPT | Performed by: FAMILY MEDICINE

## 2023-10-15 PROCEDURE — 82948 REAGENT STRIP/BLOOD GLUCOSE: CPT | Performed by: PHYSICIAN ASSISTANT

## 2023-10-16 NOTE — PROGRESS NOTES
[facility]    NAME: Pancho Mendez is a 34 y.o. female  : 1985    MRN: [id number]  DATE: October 15, 2023  TIME: 8:15 PM    Assessment and Plan   Dizziness and giddiness [R42]  1. Dizziness and giddiness        2. Anxiety            Patient Instructions     Patient Instructions   Blood sugar is fine. Recommend follow up with pcp, consider iron infusions for low ferritin  Discussed possible treatment with non-benzo for anxiety. Patient to discuss with pcp. Chief Complaint     Chief Complaint   Patient presents with    Dizziness     Pt c/o dizziness on and off for three months. History of Present Illness   34year old female here with complaint of dizziness. Has been having this off and on since having her gallbladder out. Has had numerous doctor's visits for this complaint. States that she needs iron infusions and maybe this is causing the dizziness. Ferritin level has been low. Has anxiety and takes ativan for this as needed. Was taking xanax but her doctor recently switched the medication. Wanted to have her blood sugar checked. Fingersitck recorded as 127. Denies feeling like she is sick. No cold symptoms. Dizziness just came on all of a sudden while she was at a pumpkin patch with her kids. Review of Systems   Review of Systems   Constitutional:  Negative for appetite change, chills and fever. HENT:  Negative for congestion, ear discharge, ear pain, facial swelling, postnasal drip, sinus pressure, sneezing and sore throat. Respiratory:  Negative for cough, shortness of breath and wheezing. Neurological:  Positive for dizziness. Negative for headaches.        Current Medications     Current Outpatient Medications:     ALPRAZolam (XANAX) 0.5 mg tablet, , Disp: , Rfl:     benzonatate (TESSALON) 200 MG capsule, Take 1 capsule (200 mg total) by mouth 3 (three) times a day as needed for cough (Patient not taking: Reported on 2023), Disp: 20 capsule, Rfl: 0 chlorhexidine (PERIDEX) 0.12 % solution, Apply 15 mL to the mouth or throat 2 (two) times a day, Disp: 473 mL, Rfl: 0    cholecalciferol (VITAMIN D3) 25 mcg (1,000 units) tablet, , Disp: , Rfl:     escitalopram (LEXAPRO) 5 mg tablet, Take 1 tablet (5 mg total) by mouth daily (Patient not taking: Reported on 8/17/2023), Disp: 30 tablet, Rfl: 1    ferrous sulfate 325 (65 Fe) mg tablet, Take 325 mg by mouth daily with breakfast, Disp: , Rfl:     fluticasone (FLONASE) 50 mcg/act nasal spray, 2 sprays into each nostril daily (Patient not taking: Reported on 10/2/2023), Disp: 1 g, Rfl: 0    hydrOXYzine HCL (ATARAX) 25 mg tablet, Take 1 tablet (25 mg total) by mouth every 6 (six) hours as needed for anxiety (Patient not taking: Reported on 8/17/2023), Disp: 40 tablet, Rfl: 0    ibuprofen (MOTRIN) 800 mg tablet, , Disp: , Rfl:     iron polysaccharides (FERREX) 150 mg capsule, Take 150 mg by mouth daily, Disp: , Rfl:     loratadine (CLARITIN) 10 mg tablet, Take 1 tablet (10 mg total) by mouth daily (Patient not taking: Reported on 10/2/2023), Disp: 30 tablet, Rfl: 0    LORazepam (ATIVAN) 1 mg tablet, , Disp: , Rfl:     Multiple Vitamin (MULTI-DAY PO), Take by mouth, Disp: , Rfl:     Omega-3 Fatty Acids (FISH OIL PO), Take 2 g by mouth, Disp: , Rfl:     omeprazole (PriLOSEC) 20 mg delayed release capsule, Take 1 capsule (20 mg total) by mouth daily (Patient not taking: Reported on 4/30/2023), Disp: 30 capsule, Rfl: 0    pantoprazole (PROTONIX) 40 mg tablet, , Disp: , Rfl:     QUEtiapine (SEROquel) 25 mg tablet, Take 1 tablet (25 mg total) by mouth daily at bedtime (Patient not taking: Reported on 8/17/2023), Disp: 30 tablet, Rfl: 1    venlafaxine (EFFEXOR-XR) 37.5 mg 24 hr capsule, , Disp: , Rfl:     Current Allergies     Allergies as of 10/15/2023 - Reviewed 10/15/2023   Allergen Reaction Noted    Metronidazole GI Intolerance 03/09/2018    Azithromycin Palpitations 04/03/2017    Tramadol Palpitations 04/04/2016          The following portions of the patient's history were reviewed and updated as appropriate: allergies, current medications, past family history, past medical history, past social history, past surgical history and problem list.   Past Medical History:   Diagnosis Date    Anxiety     Depression      Past Surgical History:   Procedure Laterality Date     SECTION      DILATION AND CURETTAGE, DIAGNOSTIC / THERAPEUTIC       No family history on file. Social History     Socioeconomic History    Marital status: Single     Spouse name: Not on file    Number of children: Not on file    Years of education: Not on file    Highest education level: Not on file   Occupational History    Not on file   Tobacco Use    Smoking status: Every Day     Packs/day: 0.25     Years: 20.00     Total pack years: 5.00     Types: Cigarettes    Smokeless tobacco: Never   Vaping Use    Vaping Use: Never used   Substance and Sexual Activity    Alcohol use: Never    Drug use: Not Currently     Types: Marijuana     Comment: Medical marijana    Sexual activity: Not on file   Other Topics Concern    Not on file   Social History Narrative    Not on file     Social Determinants of Health     Financial Resource Strain: Not on file   Food Insecurity: Not on file   Transportation Needs: Not on file   Physical Activity: Not on file   Stress: Not on file   Social Connections: Not on file   Intimate Partner Violence: Not on file   Housing Stability: Not on file     Medications have been verified. Objective   /93   Pulse 93   Temp 98.6 °F (37 °C)   Resp 18   SpO2 99%      Physical Exam   Physical Exam  Vitals and nursing note reviewed. Constitutional:       General: She is not in acute distress. Appearance: She is well-developed. HENT:      Head: Normocephalic and atraumatic. Right Ear: Tympanic membrane normal.      Left Ear: Tympanic membrane normal.      Nose: Nose normal. No mucosal edema or rhinorrhea.       Right Sinus: No maxillary sinus tenderness or frontal sinus tenderness. Left Sinus: No maxillary sinus tenderness or frontal sinus tenderness. Mouth/Throat:      Pharynx: No oropharyngeal exudate or posterior oropharyngeal erythema. Eyes:      Conjunctiva/sclera: Conjunctivae normal.   Cardiovascular:      Rate and Rhythm: Normal rate and regular rhythm. Heart sounds: Normal heart sounds. No murmur heard.

## 2023-11-03 ENCOUNTER — OFFICE VISIT (OUTPATIENT)
Dept: CARDIOLOGY CLINIC | Facility: CLINIC | Age: 38
End: 2023-11-03
Payer: COMMERCIAL

## 2023-11-03 VITALS
BODY MASS INDEX: 24.59 KG/M2 | SYSTOLIC BLOOD PRESSURE: 120 MMHG | HEART RATE: 84 BPM | HEIGHT: 63 IN | DIASTOLIC BLOOD PRESSURE: 80 MMHG | WEIGHT: 138.8 LBS

## 2023-11-03 DIAGNOSIS — R03.0 ELEVATED BP WITHOUT DIAGNOSIS OF HYPERTENSION: Primary | ICD-10-CM

## 2023-11-03 DIAGNOSIS — F17.200 TOBACCO USE DISORDER: ICD-10-CM

## 2023-11-03 DIAGNOSIS — I45.10 INCOMPLETE RBBB: ICD-10-CM

## 2023-11-03 DIAGNOSIS — E61.1 IRON DEFICIENCY: ICD-10-CM

## 2023-11-03 DIAGNOSIS — R00.2 INTERMITTENT PALPITATIONS: ICD-10-CM

## 2023-11-03 PROCEDURE — 99214 OFFICE O/P EST MOD 30 MIN: CPT | Performed by: INTERNAL MEDICINE

## 2023-11-03 NOTE — PROGRESS NOTES
Cardiology Follow Up    Lewis Soria  [id number]  1985  [facility] [place]  [address] [place] [address]      1. Elevated BP without diagnosis of hypertension        2. Tobacco use disorder        3. Iron deficiency        4. Intermittent palpitations        5. Incomplete RBBB            Discussion/Summary:  1. Intermittent palpitations-improving  2. Current tobacco use  3. Iron deficiency  4. Elevated blood pressure without diagnosis hypertension  5. Incomplete right bundle branch block    -Transthoracic echocardiogram 10/13/2023 showing left ventricular systolic function normal submitted LVEF 65% with normal diastolic parameters  -Patient wishes to trial shorter duration monitor than 2-week previously prescribed therefore we will trial 48-hour Holter monitor for overall ectopic burden evaluation  -Patient counseled on dietary and lifestyle modifications including following a low-salt, low-fat, heart healthy diet with continued physical activity and monitoring of home blood pressure readings and letting our office know if significantly elevated greater than 130s/80s of Hg for initiation of medical therapy  -Patient counseled on the importance of tobacco cessation which she is actively working towards  -I will see patient in 6 months or sooner if necessary  -Patient counseled on follow-up with her primary care physician for continued treatment and evaluation of iron deficiency  -Patient counseled if she were to have any warning or alarm type symptoms she is to seek emergency medical care immediately.       History of Present Illness:  -Patient is a 49-year-old female with history of documented iron deficiency, elevated blood pressure without diagnosis hypertension, former medical marijuana use and intermittent tobacco use along with anxiety and depression who also had cholecystectomy 6/26/2023 and had presented to the office in October 2023 after emergency room evaluation for intermittent chest pain and palpitations. Patient had noted some episodes with intermittent palpitations and on ECG was found to have incomplete right bundle branch pattern.  -Since last office visit patient does note improvement in symptoms and currently in office today denies any active chest pain, palpitations, lightheadedness or dizziness, loss of consciousness, shortness of breath, lower extremity NIKOLAY, orthopnea or bendopnea. She notes that as her anxiety has been improving her palpitations do seem to be less frequent. Patient notes blood pressures at home been well controlled.     Patient Active Problem List   Diagnosis    Thrombosed external hemorrhoid    Abdominal pain, acute    Anxiety    Elevated BP without diagnosis of hypertension    Insomnia    Tobacco use disorder    Acute vaginitis    Adnexal mass    Displacement of cervical intervertebral disc    Hypertension     Past Medical History:   Diagnosis Date    Anxiety     Depression      Social History     Socioeconomic History    Marital status: Single     Spouse name: Not on file    Number of children: Not on file    Years of education: Not on file    Highest education level: Not on file   Occupational History    Not on file   Tobacco Use    Smoking status: Every Day     Packs/day: 0.25     Years: 20.00     Total pack years: 5.00     Types: Cigarettes    Smokeless tobacco: Never   Vaping Use    Vaping Use: Never used   Substance and Sexual Activity    Alcohol use: Never    Drug use: Not Currently     Types: Marijuana     Comment: Medical marijana    Sexual activity: Not on file   Other Topics Concern    Not on file   Social History Narrative    Not on file     Social Determinants of Health     Financial Resource Strain: Not on file   Food Insecurity: Not on file   Transportation Needs: Not on file   Physical Activity: Not on file   Stress: Not on file   Social Connections: Not on file   Intimate Partner Violence: Not on file   Housing Stability: Not on file      History reviewed. No pertinent family history.   Past Surgical History:   Procedure Laterality Date     SECTION      DILATION AND CURETTAGE, DIAGNOSTIC / THERAPEUTIC         Current Outpatient Medications:     chlorhexidine (PERIDEX) 0.12 % solution, Apply 15 mL to the mouth or throat 2 (two) times a day, Disp: 473 mL, Rfl: 0    ferrous sulfate 325 (65 Fe) mg tablet, Take 325 mg by mouth daily with breakfast, Disp: , Rfl:     ibuprofen (MOTRIN) 800 mg tablet, , Disp: , Rfl:     LORazepam (ATIVAN) 1 mg tablet, , Disp: , Rfl:     Multiple Vitamin (MULTI-DAY PO), Take by mouth, Disp: , Rfl:     Omega-3 Fatty Acids (FISH OIL PO), Take 2 g by mouth, Disp: , Rfl:     ALPRAZolam (XANAX) 0.5 mg tablet, , Disp: , Rfl:     benzonatate (TESSALON) 200 MG capsule, Take 1 capsule (200 mg total) by mouth 3 (three) times a day as needed for cough (Patient not taking: Reported on 2023), Disp: 20 capsule, Rfl: 0    cholecalciferol (VITAMIN D3) 25 mcg (1,000 units) tablet, , Disp: , Rfl:     escitalopram (LEXAPRO) 5 mg tablet, Take 1 tablet (5 mg total) by mouth daily (Patient not taking: Reported on 2023), Disp: 30 tablet, Rfl: 1    fluticasone (FLONASE) 50 mcg/act nasal spray, 2 sprays into each nostril daily (Patient not taking: Reported on 10/2/2023), Disp: 1 g, Rfl: 0    hydrOXYzine HCL (ATARAX) 25 mg tablet, Take 1 tablet (25 mg total) by mouth every 6 (six) hours as needed for anxiety (Patient not taking: Reported on 2023), Disp: 40 tablet, Rfl: 0    iron polysaccharides (FERREX) 150 mg capsule, Take 150 mg by mouth daily (Patient not taking: Reported on 11/3/2023), Disp: , Rfl:     loratadine (CLARITIN) 10 mg tablet, Take 1 tablet (10 mg total) by mouth daily (Patient not taking: Reported on 10/2/2023), Disp: 30 tablet, Rfl: 0    omeprazole (PriLOSEC) 20 mg delayed release capsule, Take 1 capsule (20 mg total) by mouth daily (Patient not taking: Reported on 4/30/2023), Disp: 30 capsule, Rfl: 0    pantoprazole (PROTONIX) 40 mg tablet, , Disp: , Rfl:     QUEtiapine (SEROquel) 25 mg tablet, Take 1 tablet (25 mg total) by mouth daily at bedtime (Patient not taking: Reported on 8/17/2023), Disp: 30 tablet, Rfl: 1    venlafaxine (EFFEXOR-XR) 37.5 mg 24 hr capsule, , Disp: , Rfl:   Allergies   Allergen Reactions    Metronidazole GI Intolerance    Azithromycin Palpitations    Tramadol Palpitations     Pt felt horrible          Labs:  Office Visit on 10/15/2023   Component Date Value    POC Glucose 10/15/2023 [address] Outpatient Visit on 10/13/2023   Component Date Value    A4C EF 10/13/2023 65     LVIDd 10/13/2023 4.50     LVIDS 10/13/2023 3.20     IVSd 10/13/2023 0.80     LVPWd 10/13/2023 0.80     FS 10/13/2023 29     MV E' Tissue Velocity Se* 10/13/2023 15     LA Volume Index (BP) 10/13/2023 21.2     E/A ratio 10/13/2023 1.14     E wave deceleration time 10/13/2023 254     MV Peak E Braulio 10/13/2023 84     MV Peak A Braulio 10/13/2023 0.74     AV LVOT peak gradient 10/13/2023 6     LVOT peak VTI 10/13/2023 25.85     LVOT peak braulio 10/13/2023 1.18     RVID d 10/13/2023 2.4     Tricuspid annular plane * 10/13/2023 2.40     LA size 10/13/2023 3     LA length (A2C) 10/13/2023 4.80     LA volume (BP) 10/13/2023 35     RAA A4C 10/13/2023 9.7     LVOT mn grad 10/13/2023 3.0     MV stenosis pressure 1/2* 10/13/2023 74     MV valve area p 1/2 meth* 10/13/2023 2.97     TR Peak Braulio 10/13/2023 2.2     Triscuspid Valve Regurgi* 10/13/2023 19.0     Ao root 10/13/2023 3.00     Tricuspid valve peak reg* 10/13/2023 2.16     Left ventricular stroke * 10/13/2023 52.00     IVS 10/13/2023 0.8     LEFT VENTRICLE SYSTOLIC * 72/53/6468 42     LV DIASTOLIC VOLUME (MOD* 83/80/3478 94     Left Atrium Area-systoli* 10/13/2023 11.9     Left Atrium Area-systoli* 10/13/2023 15.3     LVSV, 2D 10/13/2023 52     LV EF 10/13/2023 65 Admission on 09/26/2023, Discharged on 09/27/2023   Component Date Value    Ventricular Rate 09/26/2023 86     Atrial Rate 09/26/2023 86     CA Interval 09/26/2023 148     QRSD Interval 09/26/2023 94     QT Interval 09/26/2023 354     QTC Interval 09/26/2023 423     P Axis 09/26/2023 59     QRS [place] 09/26/2023 43     T Wave [place] 09/26/2023 -2     hs TnI 0hr 09/27/2023 <2         Imaging: Echo complete w/ contrast if indicated    Result Date: 10/13/2023  Narrative:   Left Ventricle: Left ventricular cavity size is normal. Wall thickness is normal. The left ventricular ejection fraction is 65%. Systolic function is normal. Wall motion is normal. Diastolic function is normal.       Review of Systems:  Review of Systems   Constitutional:  Negative for chills, diaphoresis, fatigue and fever. HENT:  Negative for trouble swallowing and voice change. Eyes:  Negative for pain and redness. Respiratory:  Negative for shortness of breath and wheezing. Cardiovascular:  Negative for chest pain, palpitations and leg swelling. Gastrointestinal:  Negative for abdominal pain, constipation, diarrhea, nausea and vomiting. Genitourinary:  Negative for dysuria. Musculoskeletal:  Negative for neck pain and neck stiffness. Skin:  Negative for rash. Neurological:  Negative for dizziness, syncope, light-headedness and headaches. Psychiatric/Behavioral:  Negative for agitation and confusion. All other systems reviewed and are negative. Vitals:    11/03/23 1453   BP: 120/80   BP Location: Left arm   Patient Position: Sitting   Pulse: 84   Weight: 63 kg (138 lb 12.8 oz)   Height: 5' 3" (1.6 m)     Vitals:    11/03/23 1453   Weight: 63 kg (138 lb 12.8 oz)     Height: 5' 3" (160 cm)     Physical Exam:   Physical Exam  Vitals reviewed. Constitutional:       General: She is not in acute distress. Appearance: Normal appearance. She is not diaphoretic. HENT:      Head: Normocephalic and atraumatic.    Eyes: General:         Right eye: No discharge. Left eye: No discharge. Neck:      Comments: Trachea midline, no JVD  Cardiovascular:      Rate and Rhythm: Normal rate and regular rhythm. Heart sounds: No friction rub. Pulmonary:      Effort: Pulmonary effort is normal. No respiratory distress. Breath sounds: No wheezing. Chest:      Chest wall: No tenderness. Abdominal:      General: Bowel sounds are normal.      Palpations: Abdomen is soft. Tenderness: There is no abdominal tenderness. There is no rebound. Musculoskeletal:      Right lower leg: No edema. Left lower leg: No edema. Skin:     General: Skin is warm and dry. Neurological:      Mental Status: She is alert.       Comments: Awake, alert, able to answer questions appropriately, able to move extremities bilaterally   Psychiatric:         Mood and Affect: Mood normal.

## 2024-01-18 ENCOUNTER — TELEPHONE (OUTPATIENT)
Dept: CARDIOLOGY CLINIC | Facility: CLINIC | Age: 39
End: 2024-01-18

## 2024-01-18 NOTE — TELEPHONE ENCOUNTER
Called patient to inquire about the status of her [employer] monitor.  She received it in October and did not open it or return it.   I told her to mail it back to [employer] as soon as possible.

## 2024-05-29 ENCOUNTER — OFFICE VISIT (OUTPATIENT)
Dept: URGENT CARE | Facility: CLINIC | Age: 39
End: 2024-05-29
Payer: COMMERCIAL

## 2024-05-29 ENCOUNTER — APPOINTMENT (OUTPATIENT)
Dept: RADIOLOGY | Facility: CLINIC | Age: 39
End: 2024-05-29
Payer: COMMERCIAL

## 2024-05-29 VITALS
OXYGEN SATURATION: 97 % | RESPIRATION RATE: 18 BRPM | HEART RATE: 81 BPM | TEMPERATURE: 98.1 F | SYSTOLIC BLOOD PRESSURE: 133 MMHG | DIASTOLIC BLOOD PRESSURE: 81 MMHG

## 2024-05-29 DIAGNOSIS — M79.671 RIGHT FOOT PAIN: ICD-10-CM

## 2024-05-29 DIAGNOSIS — M79.5 SOFT TISSUES FOREIGN BODY: Primary | ICD-10-CM

## 2024-05-29 PROCEDURE — 99213 OFFICE O/P EST LOW 20 MIN: CPT | Performed by: PHYSICIAN ASSISTANT

## 2024-05-29 PROCEDURE — 73630 X-RAY EXAM OF FOOT: CPT

## 2024-05-29 RX ORDER — AMOXICILLIN 500 MG/1
500 CAPSULE ORAL EVERY 8 HOURS SCHEDULED
Qty: 30 CAPSULE | Refills: 0 | Status: SHIPPED | OUTPATIENT
Start: 2024-05-29 | End: 2024-06-08

## 2024-05-29 NOTE — PROGRESS NOTES
[facility]        NAME: Olya Kimbrough is a 38 y.o. female  : 1985    MRN: [id number]  DATE: May 29, 2024  TIME: 11:53 AM    /81   Pulse 81   Temp 98.1 °F (36.7 °C)   Resp 18   SpO2 97%     Assessment and Plan   Soft tissues foreign body [M79.5]  1. Soft tissues foreign body  XR foot 3+ vw right    amoxicillin (AMOXIL) 500 mg capsule            Patient Instructions       Follow up with PCP in 3-5 days.  Proceed to  ER if symptoms worsen.    Chief Complaint     Chief Complaint   Patient presents with    Foot Pain     Right foot lump was on vacation not sure if hurt then          History of Present Illness       Pt with lump on ball of foot since beach 2024  getting more irritating         Review of Systems   Review of Systems   Constitutional: Negative.    HENT: Negative.     Eyes: Negative.    Respiratory: Negative.     Cardiovascular: Negative.    Gastrointestinal: Negative.    Endocrine: Negative.    Genitourinary: Negative.    Musculoskeletal: Negative.    Skin: Negative.    Allergic/Immunologic: Negative.    Neurological: Negative.    Hematological: Negative.    Psychiatric/Behavioral: Negative.     All other systems reviewed and are negative.        Current Medications       Current Outpatient Medications:     amoxicillin (AMOXIL) 500 mg capsule, Take 1 capsule (500 mg total) by mouth every 8 (eight) hours for 10 days, Disp: 30 capsule, Rfl: 0    ALPRAZolam (XANAX) 0.5 mg tablet, , Disp: , Rfl:     benzonatate (TESSALON) 200 MG capsule, Take 1 capsule (200 mg total) by mouth 3 (three) times a day as needed for cough (Patient not taking: Reported on 2023), Disp: 20 capsule, Rfl: 0    chlorhexidine (PERIDEX) 0.12 % solution, Apply 15 mL to the mouth or throat 2 (two) times a day, Disp: 473 mL, Rfl: 0    cholecalciferol (VITAMIN D3) 25 mcg (1,000 units) tablet, , Disp: , Rfl:     escitalopram (LEXAPRO) 5 mg tablet, Take 1 tablet (5 mg total) by mouth daily (Patient not taking:  Reported on 8/17/2023), Disp: 30 tablet, Rfl: 1    ferrous sulfate 325 (65 Fe) mg tablet, Take 325 mg by mouth daily with breakfast, Disp: , Rfl:     fluticasone (FLONASE) 50 mcg/act nasal spray, 2 sprays into each nostril daily (Patient not taking: Reported on 10/2/2023), Disp: 1 g, Rfl: 0    hydrOXYzine HCL (ATARAX) 25 mg tablet, Take 1 tablet (25 mg total) by mouth every 6 (six) hours as needed for anxiety (Patient not taking: Reported on 8/17/2023), Disp: 40 tablet, Rfl: 0    ibuprofen (MOTRIN) 800 mg tablet, , Disp: , Rfl:     iron polysaccharides (FERREX) 150 mg capsule, Take 150 mg by mouth daily (Patient not taking: Reported on 11/3/2023), Disp: , Rfl:     loratadine (CLARITIN) 10 mg tablet, Take 1 tablet (10 mg total) by mouth daily (Patient not taking: Reported on 10/2/2023), Disp: 30 tablet, Rfl: 0    LORazepam (ATIVAN) 1 mg tablet, , Disp: , Rfl:     Multiple Vitamin (MULTI-DAY PO), Take by mouth, Disp: , Rfl:     Omega-3 Fatty Acids (FISH OIL PO), Take 2 g by mouth, Disp: , Rfl:     omeprazole (PriLOSEC) 20 mg delayed release capsule, Take 1 capsule (20 mg total) by mouth daily (Patient not taking: Reported on 4/30/2023), Disp: 30 capsule, Rfl: 0    pantoprazole (PROTONIX) 40 mg tablet, , Disp: , Rfl:     QUEtiapine (SEROquel) 25 mg tablet, Take 1 tablet (25 mg total) by mouth daily at bedtime (Patient not taking: Reported on 8/17/2023), Disp: 30 tablet, Rfl: 1    venlafaxine (EFFEXOR-XR) 37.5 mg 24 hr capsule, , Disp: , Rfl:     Current Allergies     Allergies as of 05/29/2024 - Reviewed 05/29/2024   Allergen Reaction Noted    Metronidazole GI Intolerance 03/09/2018    Azithromycin Palpitations 04/03/2017    Tramadol Palpitations 04/04/2016            The following portions of the patient's history were reviewed and updated as appropriate: allergies, current medications, past family history, past medical history, past social history, past surgical history and problem list.     Past Medical History:    Diagnosis Date    Anxiety     Depression        Past Surgical History:   Procedure Laterality Date     SECTION      DILATION AND CURETTAGE, DIAGNOSTIC / THERAPEUTIC         No family history on file.      Medications have been verified.        Objective   /81   Pulse 81   Temp 98.1 °F (36.7 °C)   Resp 18   SpO2 97%        Physical Exam     Physical Exam  Vitals and nursing note reviewed.   Constitutional:       Appearance: Normal appearance. She is normal weight.      Comments: Pt has made an appt  with podiatrist     Pt prefers amoxil over keflex   pt will keep podiatry appt    HENT:      Head: Normocephalic and atraumatic.   Cardiovascular:      Rate and Rhythm: Normal rate and regular rhythm.      Pulses: Normal pulses.      Heart sounds: Normal heart sounds.   Pulmonary:      Breath sounds: Normal breath sounds.   Abdominal:      Palpations: Abdomen is soft.   Musculoskeletal:      Cervical back: Normal range of motion and neck supple.      Comments: Right ball of foot  pea size nodule, ?f/o visualized  + slight ternderness minimal erythema    Neurological:      Mental Status: She is alert and oriented to person, place, and time.

## 2024-05-29 NOTE — LETTER
May 29, 2024     Patient: Olya Kimbrough   YOB: 1985   Date of Visit: 5/29/2024       To Whom It May Concern:    Olya was seen in my office today    If you have any questions or concerns, please don't hesitate to call.         Sincerely,        Efraín Polk Jr, PA-C    CC: No Recipients

## 2024-06-12 ENCOUNTER — TELEPHONE (OUTPATIENT)
Dept: PODIATRY | Facility: CLINIC | Age: 39
End: 2024-06-12

## 2024-06-12 ENCOUNTER — OFFICE VISIT (OUTPATIENT)
Dept: PODIATRY | Facility: CLINIC | Age: 39
End: 2024-06-12
Payer: COMMERCIAL

## 2024-06-12 VITALS
SYSTOLIC BLOOD PRESSURE: 122 MMHG | WEIGHT: 143 LBS | BODY MASS INDEX: 25.33 KG/M2 | HEART RATE: 85 BPM | DIASTOLIC BLOOD PRESSURE: 83 MMHG

## 2024-06-12 DIAGNOSIS — M79.671 RIGHT FOOT PAIN: Primary | ICD-10-CM

## 2024-06-12 DIAGNOSIS — B07.0 PLANTAR WARTS: ICD-10-CM

## 2024-06-12 PROCEDURE — 99202 OFFICE O/P NEW SF 15 MIN: CPT | Performed by: PODIATRIST

## 2024-06-12 PROCEDURE — 17110 DESTRUCTION B9 LES UP TO 14: CPT | Performed by: PODIATRIST

## 2024-06-12 NOTE — PROGRESS NOTES
Ambulatory Visit  Name: Olya Kimbrough      : 1985      MRN: [id number]  Encounter Provider: Stu Rader DPM  Encounter Date: 2024   Encounter department: [facility] [place]    Assessment & Plan   1. Right foot pain  2. Plantar warts    -Wart treatment as below  - Cancer and apply  - Return to my next available for continued care  - I discussed wart recurrence I also discussed topical management with the use of Mediplast    Lesion Destruction    Date/Time: 2024 11:00 AM    Performed by: Stu Rader DPM  Authorized by: Stu Rader DPM  Universal Protocol:  Consent: Verbal consent obtained.  Risks and benefits: risks, benefits and alternatives were discussed  Consent given by: patient  Patient understanding: patient states understanding of the procedure being performed  Patient consent: the patient's understanding of the procedure matches consent given  Patient identity confirmed: verbally with patient    Procedure Details - Lesion Destruction:     Number of Lesions:  1  Lesion 1:     Body area:  Lower extremity    Lower extremity location:  R foot    Malignancy: benign lesion      Destruction method: chemical removal        History of Present Illness     Olya Kimbrough is a 38 y.o. female who presents with evaluation and management of her painful right foot due to the injury sustained on  while she was on a cruise.  She notes a small painful lesion on the plantar aspect of the right foot.  She was told that she has nothing in her foot but it feels like there is something in her foot    Review of Systems   Constitutional:  Negative for chills and fever.   HENT:  Negative for ear pain and sore throat.    Eyes:  Negative for pain and visual disturbance.   Respiratory:  Negative for cough and shortness of breath.    Cardiovascular:  Negative for chest pain and palpitations.   Gastrointestinal:  Negative for abdominal pain and vomiting.    Genitourinary:  Negative for dysuria and hematuria.   Musculoskeletal:  Negative for arthralgias and back pain.   Skin:  Negative for color change and rash.   Neurological:  Negative for seizures and syncope.   All other systems reviewed and are negative.      Objective     /83 (BP Location: Right arm, Patient Position: Sitting, Cuff Size: Standard)   Pulse 85   Wt 64.9 kg (143 lb)   BMI 25.33 kg/m²     Physical Exam  Vitals reviewed.   Constitutional:       Appearance: Normal appearance.   HENT:      Head: Normocephalic and atraumatic.      Nose: Nose normal.      Mouth/Throat:      Mouth: Mucous membranes are moist.   Cardiovascular:      Pulses: Normal pulses.   Pulmonary:      Effort: Pulmonary effort is normal.   Musculoskeletal:      Comments: Pain with palpation with direct and also side-to-side compression.  Callosity around the lesion.  No open lesions.  Mild pinpoint bleeding following debridement.   Skin:     Capillary Refill: Capillary refill takes less than 2 seconds.   Neurological:      Mental Status: She is alert.       Administrative Statements

## 2024-06-12 NOTE — TELEPHONE ENCOUNTER
Caller: Olya Kimbrough    Doctor and/or Office: Stu Rader DPM    #: [phone]    Escalation: Olya was seen by Dr. Rader today.  I went into her chart so that I could tell her the name of the treatment that Dr. Rader used.

## 2024-06-12 NOTE — PATIENT INSTRUCTIONS
Instructions for Patients Undergoing Catherone Therapy for Verruca (Warts)    It is normal for acid therapy to cause the surrounding skin to get soft and white.  It is normal for acid therapy to cause some burning and tenderness after each application.  The final phase of the acid therapy is to cause a painful blister. Sometimes this happens after only one application of the Cantherone but more often after 2-4 applications. This is normal.  The skin will be whitish and the wart lesion will be swollen and tender and sometime have some drainage. Typically when this happens the wart will be gone. It is not necessary to go the ER unless there is redness all around the lesion or you have a fever. It's not typically necessary to have a culture or start antibiotics.   You can stop applying the acid at that point and you make soak the foot in Epsom salts and keep covered with a band-aid in between soaks. You doctor will open the blister at the next visit and the pain will resolve and usually the wart will be noted to be gone.

## 2024-07-08 ENCOUNTER — OFFICE VISIT (OUTPATIENT)
Dept: PODIATRY | Facility: CLINIC | Age: 39
End: 2024-07-08
Payer: COMMERCIAL

## 2024-07-08 ENCOUNTER — TELEPHONE (OUTPATIENT)
Age: 39
End: 2024-07-08

## 2024-07-08 VITALS — DIASTOLIC BLOOD PRESSURE: 68 MMHG | HEART RATE: 60 BPM | SYSTOLIC BLOOD PRESSURE: 98 MMHG

## 2024-07-08 DIAGNOSIS — B07.0 PLANTAR WARTS: Primary | ICD-10-CM

## 2024-07-08 PROCEDURE — 99212 OFFICE O/P EST SF 10 MIN: CPT | Performed by: PODIATRIST

## 2024-07-08 NOTE — TELEPHONE ENCOUNTER
Caller: Olya Kimbrough    Doctor and/or Office: Dr. Mata/Efrain    #: [phone]    Escalation: Care Patient requesting a work note to be placed in her mychart stating she was seen in our office today. She went to work late. Thank you

## 2024-07-08 NOTE — PROGRESS NOTES
Assessment/Plan:      Diagnoses and all orders for this visit:    Plantar warts      Resolved, reappoint as needed    Subjective:     Patient ID: Olya Kimbrough is a 39 y.o. female.    PAtient had plantar wart treated with cantharone, it was sore. She is here to have it checked.         Review of Systems      Objective:     Physical Exam  Vitals reviewed.   Skin:     Findings: No lesion (verruca resolved, skin lines normal, no paprika sign).   Neurological:      Mental Status: She is alert.

## 2024-07-21 ENCOUNTER — OFFICE VISIT (OUTPATIENT)
Dept: URGENT CARE | Facility: CLINIC | Age: 39
End: 2024-07-21
Payer: COMMERCIAL

## 2024-07-21 VITALS
DIASTOLIC BLOOD PRESSURE: 98 MMHG | BODY MASS INDEX: 26.08 KG/M2 | OXYGEN SATURATION: 98 % | SYSTOLIC BLOOD PRESSURE: 158 MMHG | TEMPERATURE: 98.2 F | RESPIRATION RATE: 20 BRPM | WEIGHT: 147.2 LBS | HEIGHT: 63 IN | HEART RATE: 88 BPM

## 2024-07-21 DIAGNOSIS — F41.9 ANXIETY: Primary | ICD-10-CM

## 2024-07-21 PROCEDURE — 99213 OFFICE O/P EST LOW 20 MIN: CPT | Performed by: PHYSICIAN ASSISTANT

## 2024-07-21 RX ORDER — LORAZEPAM 1 MG/1
1 TABLET ORAL DAILY PRN
Qty: 4 TABLET | Refills: 0 | Status: SHIPPED | OUTPATIENT
Start: 2024-07-21

## 2024-07-21 NOTE — PROGRESS NOTES
[facility]    NAME: Olya Kimbrough is a 39 y.o. female  : 1985    MRN: [id number]  DATE: 2024  TIME: 1:55 PM    Assessment and Plan   Anxiety [F41.9]  1. Anxiety  LORazepam (ATIVAN) 1 mg tablet          Patient Instructions     Patient Instructions   Patient was given 4 Ativan so that way she could take up to 1 a day as needed for anxiety until she sees her PCP on 2024.  I did explain to the patient that we typically do not prescribe these but I do not want to see her going through withdrawal until she sees him in 4 days.  She should continue to follow-up with her PCP for her anxiety on a regular basis.  PDMP was reviewed    Chief Complaint     Chief Complaint   Patient presents with    Anxiety     Increase in anxiety lately, requiring increase dosage of medication lately. Out of Ativan.        History of Present Illness   Olya is a 39-year-old female here with complaint of anxiety.  Patient states that she has been on Ativan prescribed by her PCP she takes 1 mg 1-2 times a day.  Last prescription was for 40 pills and was prescribed on 2024.  She was given a 20-day supply and typically she will make that last month but she had was feeling a little bit more anxious and needs to take 2 a day instead of just 1.  She states that they were away on vacation and she did not get to call her PCP last week for refill and is now out of her medication.  She is scheduled for an appointment with him on 2024.  Which is in 4 days.  PDMP was reviewed and it does match the history given by patient.  She has not had any other prescriptions filled in any other pharmacies or by any other providers .        Review of Systems   Review of Systems   Constitutional:  Negative for appetite change, chills and fever.   HENT:  Negative for congestion, ear discharge, ear pain, facial swelling, postnasal drip, sinus pressure, sneezing and sore throat.    Respiratory:  Negative for cough, shortness of breath  and wheezing.    Neurological:  Negative for headaches.   Psychiatric/Behavioral:  The patient is nervous/anxious.        Current Medications     Current Outpatient Medications:     ferrous sulfate 325 (65 Fe) mg tablet, Take 325 mg by mouth daily with breakfast, Disp: , Rfl:     LORazepam (ATIVAN) 1 mg tablet, , Disp: , Rfl:     LORazepam (ATIVAN) 1 mg tablet, Take 1 tablet (1 mg total) by mouth daily as needed for anxiety, Disp: 4 tablet, Rfl: 0    Multiple Vitamin (MULTI-DAY PO), Take by mouth, Disp: , Rfl:     Omega-3 Fatty Acids (FISH OIL PO), Take 2 g by mouth, Disp: , Rfl:     ALPRAZolam (XANAX) 0.5 mg tablet, , Disp: , Rfl:     benzonatate (TESSALON) 200 MG capsule, Take 1 capsule (200 mg total) by mouth 3 (three) times a day as needed for cough (Patient not taking: Reported on 7/14/2023), Disp: 20 capsule, Rfl: 0    chlorhexidine (PERIDEX) 0.12 % solution, Apply 15 mL to the mouth or throat 2 (two) times a day (Patient not taking: Reported on 7/21/2024), Disp: 473 mL, Rfl: 0    cholecalciferol (VITAMIN D3) 25 mcg (1,000 units) tablet, , Disp: , Rfl:     escitalopram (LEXAPRO) 5 mg tablet, Take 1 tablet (5 mg total) by mouth daily (Patient not taking: Reported on 8/17/2023), Disp: 30 tablet, Rfl: 1    fluticasone (FLONASE) 50 mcg/act nasal spray, 2 sprays into each nostril daily (Patient not taking: Reported on 10/2/2023), Disp: 1 g, Rfl: 0    hydrOXYzine HCL (ATARAX) 25 mg tablet, Take 1 tablet (25 mg total) by mouth every 6 (six) hours as needed for anxiety (Patient not taking: Reported on 8/17/2023), Disp: 40 tablet, Rfl: 0    ibuprofen (MOTRIN) 800 mg tablet, , Disp: , Rfl:     iron polysaccharides (FERREX) 150 mg capsule, Take 150 mg by mouth daily (Patient not taking: Reported on 11/3/2023), Disp: , Rfl:     loratadine (CLARITIN) 10 mg tablet, Take 1 tablet (10 mg total) by mouth daily (Patient not taking: Reported on 10/2/2023), Disp: 30 tablet, Rfl: 0    omeprazole (PriLOSEC) 20 mg delayed release  capsule, Take 1 capsule (20 mg total) by mouth daily (Patient not taking: Reported on 2023), Disp: 30 capsule, Rfl: 0    pantoprazole (PROTONIX) 40 mg tablet, , Disp: , Rfl:     QUEtiapine (SEROquel) 25 mg tablet, Take 1 tablet (25 mg total) by mouth daily at bedtime (Patient not taking: Reported on 2023), Disp: 30 tablet, Rfl: 1    venlafaxine (EFFEXOR-XR) 37.5 mg 24 hr capsule, , Disp: , Rfl:     Current Allergies     Allergies as of 2024 - Reviewed 2024   Allergen Reaction Noted    Metronidazole GI Intolerance 2018    Azithromycin Palpitations 2017    Tramadol Palpitations 2016          The following portions of the patient's history were reviewed and updated as appropriate: allergies, current medications, past family history, past medical history, past social history, past surgical history and problem list.   Past Medical History:   Diagnosis Date    Anxiety     Depression      Past Surgical History:   Procedure Laterality Date     SECTION      DILATION AND CURETTAGE, DIAGNOSTIC / THERAPEUTIC       History reviewed. No pertinent family history.  Social History     Socioeconomic History    Marital status: Single     Spouse name: Not on file    Number of children: Not on file    Years of education: Not on file    Highest education level: Not on file   Occupational History    Not on file   Tobacco Use    Smoking status: Every Day     Current packs/day: 0.25     Average packs/day: 0.3 packs/day for 20.0 years (5.0 ttl pk-yrs)     Types: Cigarettes    Smokeless tobacco: Never   Vaping Use    Vaping status: Never Used   Substance and Sexual Activity    Alcohol use: Never    Drug use: Not Currently     Types: Marijuana     Comment: Medical marijana    Sexual activity: Not on file   Other Topics Concern    Not on file   Social History Narrative    Not on file     Social Determinants of Health     Financial Resource Strain: Low Risk  (2023)    Received from [facility], [facility]    Overall Financial Resource Strain (CARDIA)     Difficulty of Paying Living Expenses: Not very hard   Food Insecurity: No Food Insecurity (6/25/2023)    Received from [facility], [facility]    Hunger Vital Sign     Worried About Running Out of Food in the Last Year: Never true     Ran Out of Food in the Last Year: Never true   Transportation Needs: No Transportation Needs (6/25/2023)    Received from [facility], [facility]    PRAPARE - Transportation     Lack of Transportation (Medical): No     Lack of Transportation (Non-Medical): No   Physical Activity: Not on file   Stress: Not on file   Social Connections: Not on file   Intimate Partner Violence: Not At Risk (6/25/2023)    Received from [facility], [facility]    Humiliation, Afraid, Rape, and Kick questionnaire     Fear of Current or Ex-Partner: No     Emotionally Abused: No     Physically Abused: No     Sexually Abused: No   Housing Stability: Low Risk  (6/25/2023)    Received from [facility], [facility]    Housing Stability Vital Sign     Unable to Pay for Housing in the Last Year: No     Number of Places Lived in the Last Year: 1     Unstable Housing in the Last Year: No     Medications have been verified.    Objective   /98   Pulse 88   Temp 98.2 °F (36.8 °C)   Resp 20   Ht 5' 3\" (1.6 m)   Wt 66.8 kg (147 lb 3.2 oz)   SpO2 98%   BMI 26.08 kg/m²      Physical Exam   Physical Exam  Vitals and nursing note reviewed.   Constitutional:       General: She is not in acute distress.     Appearance: She is well-developed.   HENT:      Head: Normocephalic and atraumatic.      Right Ear: Tympanic membrane normal.      Left Ear: Tympanic membrane normal.      Nose: Nose normal. No mucosal edema or rhinorrhea.      Right Sinus: No maxillary sinus tenderness or " frontal sinus tenderness.      Left Sinus: No maxillary sinus tenderness or frontal sinus tenderness.      Mouth/Throat:      Mouth: Oropharynx is clear and moist.      Pharynx: No oropharyngeal exudate, posterior oropharyngeal edema or posterior oropharyngeal erythema.   Eyes:      Conjunctiva/sclera: Conjunctivae normal.   Cardiovascular:      Rate and Rhythm: Normal rate and regular rhythm.      Heart sounds: Normal heart sounds. No murmur heard.  Psychiatric:         Behavior: Behavior normal.

## 2024-07-21 NOTE — PATIENT INSTRUCTIONS
Patient was given 4 Ativan so that way she could take up to 1 a day as needed for anxiety until she sees her PCP on 7/25/2024.  I did explain to the patient that we typically do not prescribe these but I do not want to see her going through withdrawal until she sees him in 4 days.  She should continue to follow-up with her PCP for her anxiety on a regular basis.  PDMP was reviewed

## 2024-07-23 ENCOUNTER — DOCUMENTATION (OUTPATIENT)
Dept: BEHAVIORAL/MENTAL HEALTH CLINIC | Facility: CLINIC | Age: 39
End: 2024-07-23

## 2024-07-23 DIAGNOSIS — F41.1 GAD (GENERALIZED ANXIETY DISORDER): Primary | ICD-10-CM

## 2024-07-23 NOTE — PROGRESS NOTES
Psychotherapy Discharge Summary    Preferred Name: Olya Kimbrough  YOB: 1985    Admission date to psychotherapy: 07/26/23    Referred by: [facility] in [place]    Presenting Problem: Per [facility] CIS 07/14/23: Pt stated that she is experience severe anxiety and has been unable to sleep for the past two weeks. Pt recently underwent gallbladder surgery and says her medications that she has been taking for several years were no longer working. Pt received different medications from PCP that also did not help and ended up going to the ED for continued panic attacks. Patient reported that she is having trouble caring for herself and three children at this time. She is concerned about her physical health and what would happen to her family if she was not around. Pt was very lethargic and tearful throughout the conversations. Pt reported that she has not received any type of  treatment except for one therapy session she received through [facility] the previous week. No SI, HI, Ah nor VH past or presents. Pt reports stressors with current and previous  at this time. No other stressors reported.     Course of treatment included : individual therapy     Progress/Outcome of Treatment Goals (brief summary of course of treatment) Olya attended 2 psychotherapy appointments; therefore, no progress was made     Treatment Complications (if any): inactivity in treatment    Treatment Progress: fair    Current [facility] Psychiatric Provider: n/a    Discharge Medications include: n/a    Discharge Date: 07/23/24    Discharge Diagnosis:   1. STACY (generalized anxiety disorder)            Criteria for Discharge:  inactivity in treatment    Aftercare recommendations include (include specific referral names and phone numbers, if appropriate): n/a    Prognosis: fair

## 2024-09-30 ENCOUNTER — OFFICE VISIT (OUTPATIENT)
Dept: URGENT CARE | Facility: CLINIC | Age: 39
End: 2024-09-30
Payer: COMMERCIAL

## 2024-09-30 VITALS
OXYGEN SATURATION: 98 % | HEART RATE: 80 BPM | DIASTOLIC BLOOD PRESSURE: 84 MMHG | SYSTOLIC BLOOD PRESSURE: 139 MMHG | RESPIRATION RATE: 18 BRPM | TEMPERATURE: 98.3 F

## 2024-09-30 DIAGNOSIS — R52 GENERALIZED BODY ACHES: Primary | ICD-10-CM

## 2024-09-30 PROCEDURE — 99213 OFFICE O/P EST LOW 20 MIN: CPT | Performed by: STUDENT IN AN ORGANIZED HEALTH CARE EDUCATION/TRAINING PROGRAM

## 2024-09-30 PROCEDURE — 87636 SARSCOV2 & INF A&B AMP PRB: CPT | Performed by: STUDENT IN AN ORGANIZED HEALTH CARE EDUCATION/TRAINING PROGRAM

## 2024-09-30 NOTE — LETTER
September 30, 2024     Patient: Olya Kimbrough   YOB: 1985   Date of Visit: 9/30/2024       To Whom It May Concern:    It is my medical opinion that Olya Kimbrough may return to work on 10/2/2024 .    If you have any questions or concerns, please don't hesitate to call.         Sincerely,        Eneida Blanchard,     CC: No Recipients

## 2024-09-30 NOTE — PROGRESS NOTES
[facility]        NAME: Olya Kimbrough is a 39 y.o. female  : 1985    MRN: [id number]  DATE: 2024  TIME: 10:34 AM    Assessment and Orders   Generalized body aches [R52]  1. Generalized body aches  Covid/Flu- Office Collect Normal    Covid/Flu- Office Collect Normal            Plan and Discussion      Symptoms and exam consistent with viral illness.  Patient requesting COVID Flu testing and will follow up with the results on her mychart. She understands that a positive test does not .      Risks and benefits discussed. Patient understands and agrees with the plan.     PATIENT INSTRUCTIONS    If tests have been performed at [facility], our office will contact you with results if changes need to be made to the care plan discussed with you at the visit.  You can review your full results on [facility].    Follow up with PCP.     If any of the following occur, please report to your nearest ED for evaluation or call 911.   Difficultly breathing or shortness of breath  Chest pain  Acutely worsening symptoms.         Chief Complaint     Chief Complaint   Patient presents with    Cold Like Symptoms    Cough     Since Thursday, body ache         History of Present Illness       Cough  This is a new problem. The current episode started in the past 7 days. The problem has been unchanged. Associated symptoms include myalgias. Pertinent negatives include no fever, nasal congestion, postnasal drip, rhinorrhea, shortness of breath, sweats or wheezing. There is no history of asthma or COPD.       Review of Systems   Review of Systems   Constitutional:  Negative for fever.   HENT:  Negative for postnasal drip and rhinorrhea.    Respiratory:  Positive for cough. Negative for shortness of breath and wheezing.    Musculoskeletal:  Positive for myalgias.         Current Medications       Current Outpatient Medications:     LORazepam (ATIVAN) 1 mg tablet, , Disp: , Rfl:     LORazepam  (ATIVAN) 1 mg tablet, Take 1 tablet (1 mg total) by mouth daily as needed for anxiety, Disp: 4 tablet, Rfl: 0    ALPRAZolam (XANAX) 0.5 mg tablet, , Disp: , Rfl:     benzonatate (TESSALON) 200 MG capsule, Take 1 capsule (200 mg total) by mouth 3 (three) times a day as needed for cough (Patient not taking: Reported on 7/14/2023), Disp: 20 capsule, Rfl: 0    chlorhexidine (PERIDEX) 0.12 % solution, Apply 15 mL to the mouth or throat 2 (two) times a day (Patient not taking: Reported on 7/21/2024), Disp: 473 mL, Rfl: 0    cholecalciferol (VITAMIN D3) 25 mcg (1,000 units) tablet, , Disp: , Rfl:     escitalopram (LEXAPRO) 5 mg tablet, Take 1 tablet (5 mg total) by mouth daily (Patient not taking: Reported on 9/30/2024), Disp: 30 tablet, Rfl: 1    ferrous sulfate 325 (65 Fe) mg tablet, Take 325 mg by mouth daily with breakfast (Patient not taking: Reported on 9/30/2024), Disp: , Rfl:     fluticasone (FLONASE) 50 mcg/act nasal spray, 2 sprays into each nostril daily (Patient not taking: Reported on 10/2/2023), Disp: 1 g, Rfl: 0    hydrOXYzine HCL (ATARAX) 25 mg tablet, Take 1 tablet (25 mg total) by mouth every 6 (six) hours as needed for anxiety (Patient not taking: Reported on 8/17/2023), Disp: 40 tablet, Rfl: 0    ibuprofen (MOTRIN) 800 mg tablet, , Disp: , Rfl:     iron polysaccharides (FERREX) 150 mg capsule, Take 150 mg by mouth daily (Patient not taking: Reported on 11/3/2023), Disp: , Rfl:     loratadine (CLARITIN) 10 mg tablet, Take 1 tablet (10 mg total) by mouth daily (Patient not taking: Reported on 10/2/2023), Disp: 30 tablet, Rfl: 0    Multiple Vitamin (MULTI-DAY PO), Take by mouth (Patient not taking: Reported on 9/30/2024), Disp: , Rfl:     Omega-3 Fatty Acids (FISH OIL PO), Take 2 g by mouth (Patient not taking: Reported on 9/30/2024), Disp: , Rfl:     omeprazole (PriLOSEC) 20 mg delayed release capsule, Take 1 capsule (20 mg total) by mouth daily (Patient not taking: Reported on 4/30/2023), Disp: 30  capsule, Rfl: 0    pantoprazole (PROTONIX) 40 mg tablet, , Disp: , Rfl:     QUEtiapine (SEROquel) 25 mg tablet, Take 1 tablet (25 mg total) by mouth daily at bedtime (Patient not taking: Reported on 2023), Disp: 30 tablet, Rfl: 1    venlafaxine (EFFEXOR-XR) 37.5 mg 24 hr capsule, , Disp: , Rfl:     Current Allergies     Allergies as of 2024 - Reviewed 2024   Allergen Reaction Noted    Metronidazole GI Intolerance 2018    Azithromycin Palpitations 2017    Tramadol Palpitations 2016            The following portions of the patient's history were reviewed and updated as appropriate: allergies, current medications, past family history, past medical history, past social history, past surgical history and problem list.     Past Medical History:   Diagnosis Date    Anxiety     Depression        Past Surgical History:   Procedure Laterality Date     SECTION      DILATION AND CURETTAGE, DIAGNOSTIC / THERAPEUTIC         History reviewed. No pertinent family history.      Medications have been verified.        Objective   /84   Pulse 80   Temp 98.3 °F (36.8 °C)   Resp 18   LMP 2024 (Approximate) Comment: denies preg  SpO2 98%   Patient's last menstrual period was 2024 (approximate).       Physical Exam     Physical Exam  Constitutional:       General: She is not in acute distress.     Appearance: She is not ill-appearing or toxic-appearing.   HENT:      Right Ear: Tympanic membrane and external ear normal.      Left Ear: Tympanic membrane and external ear normal.      Mouth/Throat:      Pharynx: No oropharyngeal exudate or posterior oropharyngeal erythema.   Cardiovascular:      Rate and Rhythm: Normal rate and regular rhythm.   Pulmonary:      Effort: Pulmonary effort is normal. No respiratory distress.      Breath sounds: No wheezing or rhonchi.   Neurological:      General: No focal deficit present.      Mental Status: She is alert and oriented to person,  place, and time.   Psychiatric:         Mood and Affect: Mood normal.         Behavior: Behavior normal.               Eneida Blanchard DO

## 2024-10-08 ENCOUNTER — OFFICE VISIT (OUTPATIENT)
Dept: PODIATRY | Facility: CLINIC | Age: 39
End: 2024-10-08
Payer: COMMERCIAL

## 2024-10-08 ENCOUNTER — OFFICE VISIT (OUTPATIENT)
Dept: URGENT CARE | Facility: CLINIC | Age: 39
End: 2024-10-08
Payer: COMMERCIAL

## 2024-10-08 VITALS
RESPIRATION RATE: 18 BRPM | TEMPERATURE: 98.1 F | OXYGEN SATURATION: 98 % | HEART RATE: 79 BPM | DIASTOLIC BLOOD PRESSURE: 98 MMHG | SYSTOLIC BLOOD PRESSURE: 141 MMHG

## 2024-10-08 DIAGNOSIS — R35.0 URINARY FREQUENCY: Primary | ICD-10-CM

## 2024-10-08 DIAGNOSIS — M79.671 RIGHT FOOT PAIN: Primary | ICD-10-CM

## 2024-10-08 DIAGNOSIS — L84 CALLUS: ICD-10-CM

## 2024-10-08 DIAGNOSIS — B07.0 PLANTAR WARTS: ICD-10-CM

## 2024-10-08 LAB
SL AMB  POCT GLUCOSE, UA: NEGATIVE
SL AMB LEUKOCYTE ESTERASE,UA: ABNORMAL
SL AMB POCT BILIRUBIN,UA: NEGATIVE
SL AMB POCT BLOOD,UA: ABNORMAL
SL AMB POCT CLARITY,UA: CLEAR
SL AMB POCT COLOR,UA: YELLOW
SL AMB POCT KETONES,UA: ABNORMAL
SL AMB POCT NITRITE,UA: NEGATIVE
SL AMB POCT PH,UA: 7.5
SL AMB POCT SPECIFIC GRAVITY,UA: 1
SL AMB POCT URINE PROTEIN: NEGATIVE
SL AMB POCT UROBILINOGEN: 0.2

## 2024-10-08 PROCEDURE — 99213 OFFICE O/P EST LOW 20 MIN: CPT | Performed by: PHYSICIAN ASSISTANT

## 2024-10-08 PROCEDURE — 87086 URINE CULTURE/COLONY COUNT: CPT | Performed by: PHYSICIAN ASSISTANT

## 2024-10-08 PROCEDURE — 99212 OFFICE O/P EST SF 10 MIN: CPT | Performed by: PODIATRIST

## 2024-10-08 PROCEDURE — 81002 URINALYSIS NONAUTO W/O SCOPE: CPT | Performed by: PHYSICIAN ASSISTANT

## 2024-10-08 NOTE — PROGRESS NOTES
Ambulatory Visit  Name: Olya Kimbrough      : 1985      MRN: [id number]  Encounter Provider: Douglas Layton DPM  Encounter Date: 10/8/2024   Encounter department: [facility]OD[facility] [place]    Assessment & Plan  Right foot pain         Callus       Debrided tyloma to dermal layer removing the central IPK and dispensing quarter inch felt pads with center cut out for the patient to use.  She was instructed to use the pad either on her skin or she can use it on a over-the-counter insert after wearing it for short period of time to create a delve to know where to place the pad.    Patient was informed that most likely the callus was secondary to wart treatment that she had sometime ago and developed the deformity.  She was told that the warts could come back again and most likely would be same general area where they previously had been.  Plantar warts       The plantar warts are resolved there is no pathology found in the tissue upon debridement.    Return if symptoms worsen or fail to improve.     History of Present Illness     Olya Kimbrough is a 39 y.o. female who presents with chief complaint of a painful lesion present on the bottom of her right foot.  Patient is concerned that she has developed warts again she has a history of being treated for that and was most recently treated in  and July.    History obtained from : patient  Review of Systems  Medical History Reviewed by provider this encounter:       Current Outpatient Medications on File Prior to Visit   Medication Sig Dispense Refill    cholecalciferol (VITAMIN D3) 25 mcg (1,000 units) tablet  (Patient not taking: Reported on 10/8/2024)      escitalopram (LEXAPRO) 5 mg tablet Take 1 tablet (5 mg total) by mouth daily (Patient not taking: Reported on 10/8/2024) 30 tablet 1    ferrous sulfate 325 (65 Fe) mg tablet Take 325 mg by mouth daily with breakfast (Patient not taking: Reported on 10/8/2024)      fluticasone (FLONASE) 50 mcg/act nasal  spray 2 sprays into each nostril daily (Patient not taking: Reported on 10/8/2024) 1 g 0    hydrOXYzine HCL (ATARAX) 25 mg tablet Take 1 tablet (25 mg total) by mouth every 6 (six) hours as needed for anxiety (Patient not taking: Reported on 10/8/2024) 40 tablet 0    ibuprofen (MOTRIN) 800 mg tablet  (Patient not taking: Reported on 10/8/2024)      loratadine (CLARITIN) 10 mg tablet Take 1 tablet (10 mg total) by mouth daily (Patient not taking: Reported on 10/8/2024) 30 tablet 0    LORazepam (ATIVAN) 1 mg tablet Take 1 tablet (1 mg total) by mouth daily as needed for anxiety 4 tablet 0    Multiple Vitamin (MULTI-DAY PO) Take by mouth (Patient not taking: Reported on 10/8/2024)      Omega-3 Fatty Acids (FISH OIL PO) Take 2 g by mouth      omeprazole (PriLOSEC) 20 mg delayed release capsule Take 1 capsule (20 mg total) by mouth daily (Patient not taking: Reported on 10/8/2024) 30 capsule 0    pantoprazole (PROTONIX) 40 mg tablet  (Patient not taking: Reported on 10/8/2024)      QUEtiapine (SEROquel) 25 mg tablet Take 1 tablet (25 mg total) by mouth daily at bedtime (Patient not taking: Reported on 10/8/2024) 30 tablet 1    venlafaxine (EFFEXOR-XR) 37.5 mg 24 hr capsule  (Patient not taking: Reported on 10/8/2024)      ALPRAZolam (XANAX) 0.5 mg tablet  (Patient not taking: Reported on 11/3/2023)      benzonatate (TESSALON) 200 MG capsule Take 1 capsule (200 mg total) by mouth 3 (three) times a day as needed for cough (Patient not taking: Reported on 7/14/2023) 20 capsule 0    chlorhexidine (PERIDEX) 0.12 % solution Apply 15 mL to the mouth or throat 2 (two) times a day (Patient not taking: Reported on 7/21/2024) 473 mL 0    iron polysaccharides (FERREX) 150 mg capsule Take 150 mg by mouth daily (Patient not taking: Reported on 11/3/2023)      LORazepam (ATIVAN) 1 mg tablet  (Patient not taking: Reported on 10/8/2024)       No current facility-administered medications on file prior to visit.      Social History      Tobacco Use    Smoking status: Every Day     Current packs/day: 0.25     Average packs/day: 0.3 packs/day for 20.0 years (5.0 ttl pk-yrs)     Types: Cigarettes    Smokeless tobacco: Never   Vaping Use    Vaping status: Never Used   Substance and Sexual Activity    Alcohol use: Never    Drug use: Yes     Types: Marijuana     Comment: Medical dennis    Sexual activity: Not on file         Objective     LMP 09/23/2024 (Approximate) Comment: denies preg    Physical Exam  Vascular status is 2/4 DP PT negative digital hair normal distal cooling immediate capillary refill right extremity    Derm there is hypertrophic tissue present in the area of the submet 3 on the right foot.  Upon debridement of the area central IPK was noted but no pinpoint bleeding was noted in the area and no interruption of skin lines were seen.  The size of the hypertrophic tissue lesion was approximately 0.8 x 0.8 cm.    Ortho no pathology is noted at this time.    Neuro light touch is present and intact on the right extremity.  Administrative Statements   I have spent a total time of 14 minutes in caring for this patient on the day of the visit/encounter including Risks and benefits of tx options, Instructions for management, Patient and family education, Importance of tx compliance, Documenting in the medical record, Reviewing / ordering tests, medicine, procedures  , and Obtaining or reviewing history  .

## 2024-10-08 NOTE — PROGRESS NOTES
[facility]    NAME: Olya Kimbrough is a 39 y.o. female  : 1985    MRN: [id number]  DATE: 2024  TIME: 6:23 PM    Assessment and Plan   Urinary frequency [R35.0]  1. Urinary frequency  POCT urine dip    Urine culture      Trace WBCs on dip and non hemolyzed blood.  No ketones    Patient Instructions     Patient Instructions   Will send urine for culture.  If positive will treat.      Chief Complaint     Chief Complaint   Patient presents with    Possible UTI     Had a urine dip, showed ketones and leukocytes with PCP visit today, wants a second opinion, c/o frequency        History of Present Illness   39-year-old female here with complaint of some urinary frequency.  Had a urine dip done at her family doctor today for med check and they told her that there were some ketones in her urine and white blood cells.  She is not having any burning with urination.  Patient does drink a lot of water.  No glucose in her urine.  States that she wanted to have a second opinion.        Review of Systems   Review of Systems   Constitutional:  Negative for activity change, appetite change, chills, fatigue and fever.   Respiratory:  Negative for cough.    Cardiovascular:  Negative for chest pain.   Gastrointestinal:  Negative for abdominal pain, constipation, diarrhea, nausea and vomiting.   Genitourinary:  Positive for frequency. Negative for difficulty urinating, dysuria, flank pain, hematuria and urgency.   Musculoskeletal:  Negative for back pain and myalgias.   All other systems reviewed and are negative.      Current Medications     Current Outpatient Medications:     LORazepam (ATIVAN) 1 mg tablet, Take 1 tablet (1 mg total) by mouth daily as needed for anxiety, Disp: 4 tablet, Rfl: 0    Omega-3 Fatty Acids (FISH OIL PO), Take 2 g by mouth, Disp: , Rfl:     ALPRAZolam (XANAX) 0.5 mg tablet, , Disp: , Rfl:     benzonatate (TESSALON) 200 MG capsule, Take 1 capsule (200 mg total) by mouth 3 (three) times  a day as needed for cough (Patient not taking: Reported on 7/14/2023), Disp: 20 capsule, Rfl: 0    chlorhexidine (PERIDEX) 0.12 % solution, Apply 15 mL to the mouth or throat 2 (two) times a day (Patient not taking: Reported on 7/21/2024), Disp: 473 mL, Rfl: 0    cholecalciferol (VITAMIN D3) 25 mcg (1,000 units) tablet, , Disp: , Rfl:     escitalopram (LEXAPRO) 5 mg tablet, Take 1 tablet (5 mg total) by mouth daily (Patient not taking: Reported on 10/8/2024), Disp: 30 tablet, Rfl: 1    ferrous sulfate 325 (65 Fe) mg tablet, Take 325 mg by mouth daily with breakfast (Patient not taking: Reported on 10/8/2024), Disp: , Rfl:     fluticasone (FLONASE) 50 mcg/act nasal spray, 2 sprays into each nostril daily (Patient not taking: Reported on 10/8/2024), Disp: 1 g, Rfl: 0    hydrOXYzine HCL (ATARAX) 25 mg tablet, Take 1 tablet (25 mg total) by mouth every 6 (six) hours as needed for anxiety (Patient not taking: Reported on 10/8/2024), Disp: 40 tablet, Rfl: 0    ibuprofen (MOTRIN) 800 mg tablet, , Disp: , Rfl:     iron polysaccharides (FERREX) 150 mg capsule, Take 150 mg by mouth daily (Patient not taking: Reported on 11/3/2023), Disp: , Rfl:     loratadine (CLARITIN) 10 mg tablet, Take 1 tablet (10 mg total) by mouth daily (Patient not taking: Reported on 10/8/2024), Disp: 30 tablet, Rfl: 0    LORazepam (ATIVAN) 1 mg tablet, , Disp: , Rfl:     Multiple Vitamin (MULTI-DAY PO), Take by mouth (Patient not taking: Reported on 10/8/2024), Disp: , Rfl:     omeprazole (PriLOSEC) 20 mg delayed release capsule, Take 1 capsule (20 mg total) by mouth daily (Patient not taking: Reported on 10/8/2024), Disp: 30 capsule, Rfl: 0    pantoprazole (PROTONIX) 40 mg tablet, , Disp: , Rfl:     QUEtiapine (SEROquel) 25 mg tablet, Take 1 tablet (25 mg total) by mouth daily at bedtime (Patient not taking: Reported on 10/8/2024), Disp: 30 tablet, Rfl: 1    venlafaxine (EFFEXOR-XR) 37.5 mg 24 hr capsule, , Disp: , Rfl:     Current Allergies      Allergies as of 10/08/2024 - Reviewed 10/08/2024   Allergen Reaction Noted    Metronidazole GI Intolerance 2018    Azithromycin Palpitations 2017    Tramadol Palpitations 2016          The following portions of the patient's history were reviewed and updated as appropriate: allergies, current medications, past family history, past medical history, past social history, past surgical history and problem list.   Past Medical History:   Diagnosis Date    Anxiety     Depression      Past Surgical History:   Procedure Laterality Date     SECTION      DILATION AND CURETTAGE, DIAGNOSTIC / THERAPEUTIC       No family history on file.  Social History     Socioeconomic History    Marital status: Single     Spouse name: Not on file    Number of children: Not on file    Years of education: Not on file    Highest education level: Not on file   Occupational History    Not on file   Tobacco Use    Smoking status: Every Day     Current packs/day: 0.25     Average packs/day: 0.3 packs/day for 20.0 years (5.0 ttl pk-yrs)     Types: Cigarettes    Smokeless tobacco: Never   Vaping Use    Vaping status: Never Used   Substance and Sexual Activity    Alcohol use: Never    Drug use: Yes     Types: Marijuana     Comment: Medical dennis    Sexual activity: Not on file   Other Topics Concern    Not on file   Social History Narrative    Not on file     Social Determinants of Health     Financial Resource Strain: Low Risk  (2023)    Received from [facility], [facility]    Overall Financial Resource Strain (CARDIA)     Difficulty of Paying Living Expenses: Not very hard   Food Insecurity: No Food Insecurity (2023)    Received from [facility], [facility]    Hunger Vital Sign     Worried About Running Out of Food in the Last Year: Never true     Ran Out of Food in the Last Year: Never true   Transportation Needs: No Transportation Needs  (6/25/2023)    [facility] from [facility], [facility]    PRAPARE - Transportation     Lack of Transportation (Medical): No     Lack of Transportation (Non-Medical): No   Physical Activity: Not on file   Stress: Not on file   Social Connections: Not on file   Intimate Partner Violence: Not At Risk (6/25/2023)    [facility] from [facility], [facility]    Humiliation, Afraid, Rape, and Kick questionnaire     Fear of Current or Ex-Partner: No     Emotionally Abused: No     Physically Abused: No     Sexually Abused: No   Housing Stability: Low Risk  (6/25/2023)    Received from [facility], [facility]    Housing Stability Vital Sign     Unable to Pay for Housing in the Last Year: No     Number of Places Lived in the Last Year: 1     Unstable Housing in the Last Year: No     Medications have been verified.    Objective   /98   Pulse 79   Temp 98.1 °F (36.7 °C)   Resp 18   LMP 09/23/2024 (Approximate) Comment: denies preg  SpO2 98%      Physical Exam   Physical Exam  Vitals and nursing note reviewed.   Constitutional:       General: She is not in acute distress.     Appearance: Normal appearance. She is well-developed.   HENT:      Head: Normocephalic and atraumatic.   Cardiovascular:      Rate and Rhythm: Normal rate and regular rhythm.   Pulmonary:      Effort: Pulmonary effort is normal. No respiratory distress.   Abdominal:      Tenderness: There is no abdominal tenderness. There is no CVA tenderness.

## 2024-10-09 LAB — BACTERIA UR CULT: NORMAL

## 2024-12-11 ENCOUNTER — OFFICE VISIT (OUTPATIENT)
Dept: URGENT CARE | Facility: CLINIC | Age: 39
End: 2024-12-11
Payer: COMMERCIAL

## 2024-12-11 VITALS
DIASTOLIC BLOOD PRESSURE: 78 MMHG | TEMPERATURE: 98.5 F | RESPIRATION RATE: 20 BRPM | BODY MASS INDEX: 26.22 KG/M2 | HEART RATE: 78 BPM | SYSTOLIC BLOOD PRESSURE: 130 MMHG | WEIGHT: 148 LBS | OXYGEN SATURATION: 98 %

## 2024-12-11 DIAGNOSIS — J02.9 ACUTE PHARYNGITIS, UNSPECIFIED ETIOLOGY: ICD-10-CM

## 2024-12-11 DIAGNOSIS — Z20.828 CONTACT WITH AND (SUSPECTED) EXPOSURE TO OTHER VIRAL COMMUNICABLE DISEASES: ICD-10-CM

## 2024-12-11 DIAGNOSIS — B34.9 ACUTE VIRAL SYNDROME: Primary | ICD-10-CM

## 2024-12-11 LAB — S PYO AG THROAT QL: NEGATIVE

## 2024-12-11 PROCEDURE — 99213 OFFICE O/P EST LOW 20 MIN: CPT | Performed by: NURSE PRACTITIONER

## 2024-12-11 PROCEDURE — 87070 CULTURE OTHR SPECIMN AEROBIC: CPT | Performed by: NURSE PRACTITIONER

## 2024-12-11 PROCEDURE — 87147 CULTURE TYPE IMMUNOLOGIC: CPT | Performed by: NURSE PRACTITIONER

## 2024-12-11 PROCEDURE — 87636 SARSCOV2 & INF A&B AMP PRB: CPT | Performed by: NURSE PRACTITIONER

## 2024-12-11 NOTE — PATIENT INSTRUCTIONS
Your strep A is negative. You have a throat culture pending. You are to download [facility] for the results in 3-4 days.  You will be notified if the results are + and an antibiotic will be called in for you.    You are to do warm salt water gargles 4 x daily.  Drink warm tea with honey and lemon.  Take tylenol or motrin as able for pain or fever.  Chloraseptic throat spray, cough drops.  Do not share utensils.  Change your tooth brush in 3 days.  Follow up with your PCP in 2-3 days  Go to the ED if symptoms worsen      You have a covid and flu test pending.  You are to mask until you get your results.   Take OTC cough, cold congestion products for symptoms.

## 2024-12-11 NOTE — LETTER
December 11, 2024     Patient: Olya Kimbrough   YOB: 1985   Date of Visit: 12/11/2024       To Whom It May Concern:    It is my medical opinion that Olya Kimbrough may return to work on 12/12 and 12/13 as long as she is wearing a mask until testing results are back.    If you have any questions or concerns, please don't hesitate to call.         Sincerely,        BRANDON Ware    CC: No Recipients

## 2024-12-11 NOTE — LETTER
December 11, 2024     Patient: Olya Kimbrough   YOB: 1985   Date of Visit: 12/11/2024       To Whom It May Concern:    It is my medical opinion that Olya Kimbrough may return to work on 12/16/2024 .    If you have any questions or concerns, please don't hesitate to call.         Sincerely,        BRANDON Ware    CC: No Recipients

## 2024-12-11 NOTE — PROGRESS NOTES
[facility]        NAME: Olya Kimbrough is a 39 y.o. female  : 1985    MRN: [id number]  DATE: 2024  TIME: 1:52 PM    Assessment and Plan   Acute viral syndrome [B34.9]  1. Acute viral syndrome        2. Acute pharyngitis, unspecified etiology  POCT rapid strepA    Throat culture    Throat culture      3. Contact with and (suspected) exposure to other viral communicable diseases  Covid/Flu- Office Collect Normal    Covid/Flu- Office Collect Normal            Patient Instructions       Follow up with PCP in 3-5 days.  Proceed to  ER if symptoms worsen.    If tests have been performed at [facility], our office will contact you with results if changes need to be made to the care plan discussed with you at the visit.  [facility]    Your strep A is negative. You have a throat culture pending. You are to download [facility] mychart for the results in 3-4 days.  You will be notified if the results are + and an antibiotic will be called in for you.    You are to do warm salt water gargles 4 x daily.  Drink warm tea with honey and lemon.  Take tylenol or motrin as able for pain or fever.  Chloraseptic throat spray, cough drops.  Do not share utensils.  Change your tooth brush in 3 days.  Follow up with your PCP in 2-3 days  Go to the ED if symptoms worsen      You have a covid and flu test pending.  You are to mask until you get your results.   Take OTC cough, cold congestion products for symptoms.           Chief Complaint   No chief complaint on file.        History of Present Illness       This is a 39 year old female who comes to care now with c/o cold symptoms x 3 days. She states has sorethroat, headache, bodyaches. She states she works at  and is unsure of exposure.  She state she has taken OTC w/o relief.   Denies n/v/d, pregnancy.  PMH is listed and reviewed.         Review of Systems   Review of Systems   Constitutional:  Positive for fatigue and  fever.   HENT:  Positive for congestion and sore throat.    Eyes: Negative.    Respiratory:  Positive for cough.    Cardiovascular: Negative.    Gastrointestinal: Negative.    Endocrine: Negative.    Genitourinary: Negative.    Musculoskeletal:  Positive for myalgias.   Skin: Negative.    Allergic/Immunologic: Negative.    Neurological: Negative.    Hematological: Negative.    Psychiatric/Behavioral: Negative.           Current Medications       Current Outpatient Medications:     ALPRAZolam (XANAX) 0.5 mg tablet, , Disp: , Rfl:     benzonatate (TESSALON) 200 MG capsule, Take 1 capsule (200 mg total) by mouth 3 (three) times a day as needed for cough (Patient not taking: Reported on 7/14/2023), Disp: 20 capsule, Rfl: 0    chlorhexidine (PERIDEX) 0.12 % solution, Apply 15 mL to the mouth or throat 2 (two) times a day (Patient not taking: Reported on 7/21/2024), Disp: 473 mL, Rfl: 0    cholecalciferol (VITAMIN D3) 25 mcg (1,000 units) tablet, , Disp: , Rfl:     escitalopram (LEXAPRO) 5 mg tablet, Take 1 tablet (5 mg total) by mouth daily (Patient not taking: Reported on 10/8/2024), Disp: 30 tablet, Rfl: 1    ferrous sulfate 325 (65 Fe) mg tablet, Take 325 mg by mouth daily with breakfast (Patient not taking: Reported on 10/8/2024), Disp: , Rfl:     fluticasone (FLONASE) 50 mcg/act nasal spray, 2 sprays into each nostril daily (Patient not taking: Reported on 10/8/2024), Disp: 1 g, Rfl: 0    hydrOXYzine HCL (ATARAX) 25 mg tablet, Take 1 tablet (25 mg total) by mouth every 6 (six) hours as needed for anxiety (Patient not taking: Reported on 10/8/2024), Disp: 40 tablet, Rfl: 0    ibuprofen (MOTRIN) 800 mg tablet, , Disp: , Rfl:     iron polysaccharides (FERREX) 150 mg capsule, Take 150 mg by mouth daily (Patient not taking: Reported on 11/3/2023), Disp: , Rfl:     loratadine (CLARITIN) 10 mg tablet, Take 1 tablet (10 mg total) by mouth daily (Patient not taking: Reported on 10/8/2024), Disp: 30 tablet, Rfl: 0     LORazepam (ATIVAN) 1 mg tablet, , Disp: , Rfl:     LORazepam (ATIVAN) 1 mg tablet, Take 1 tablet (1 mg total) by mouth daily as needed for anxiety, Disp: 4 tablet, Rfl: 0    Multiple Vitamin (MULTI-DAY PO), Take by mouth (Patient not taking: Reported on 10/8/2024), Disp: , Rfl:     Omega-3 Fatty Acids (FISH OIL PO), Take 2 g by mouth, Disp: , Rfl:     omeprazole (PriLOSEC) 20 mg delayed release capsule, Take 1 capsule (20 mg total) by mouth daily (Patient not taking: Reported on 10/8/2024), Disp: 30 capsule, Rfl: 0    pantoprazole (PROTONIX) 40 mg tablet, , Disp: , Rfl:     QUEtiapine (SEROquel) 25 mg tablet, Take 1 tablet (25 mg total) by mouth daily at bedtime (Patient not taking: Reported on 10/8/2024), Disp: 30 tablet, Rfl: 1    venlafaxine (EFFEXOR-XR) 37.5 mg 24 hr capsule, , Disp: , Rfl:     Current Allergies     Allergies as of 2024 - Reviewed 2024   Allergen Reaction Noted    Metronidazole GI Intolerance 2018    Azithromycin Palpitations 2017    Tramadol Palpitations 2016            The following portions of the patient's history were reviewed and updated as appropriate: allergies, current medications, past family history, past medical history, past social history, past surgical history and problem list.     Past Medical History:   Diagnosis Date    Anxiety     Depression        Past Surgical History:   Procedure Laterality Date     SECTION      DILATION AND CURETTAGE, DIAGNOSTIC / THERAPEUTIC         History reviewed. No pertinent family history.      Medications have been verified.        Objective   /78   Pulse 78   Temp 98.5 °F (36.9 °C)   Resp 20   Wt 67.1 kg (148 lb)   SpO2 98%   BMI 26.22 kg/m²   No LMP recorded.       Physical Exam     Physical Exam  Vitals and nursing note reviewed.   Constitutional:       General: She is not in acute distress.     Appearance: Normal appearance. She is normal weight. She is not ill-appearing, toxic-appearing or  diaphoretic.   HENT:      Head: Normocephalic and atraumatic.      Right Ear: Tympanic membrane and ear canal normal.      Left Ear: Tympanic membrane and ear canal normal.      Nose: No congestion or rhinorrhea.      Mouth/Throat:      Mouth: Mucous membranes are moist.      Pharynx: No oropharyngeal exudate or posterior oropharyngeal erythema.   Eyes:      Extraocular Movements: Extraocular movements intact.   Cardiovascular:      Rate and Rhythm: Normal rate and regular rhythm.      Pulses: Normal pulses.      Heart sounds: Murmur heard.   Pulmonary:      Effort: Pulmonary effort is normal. No respiratory distress.      Breath sounds: Normal breath sounds. No stridor. No wheezing, rhonchi or rales.   Chest:      Chest wall: No tenderness.   Musculoskeletal:         General: Normal range of motion.      Cervical back: Normal range of motion and neck supple.   Skin:     General: Skin is warm and dry.      Capillary Refill: Capillary refill takes less than 2 seconds.   Neurological:      General: No focal deficit present.      Mental Status: She is alert and oriented to person, place, and time.   Psychiatric:         Mood and Affect: Mood normal.         Thought Content: Thought content normal.         Judgment: Judgment normal.

## 2024-12-13 ENCOUNTER — RESULTS FOLLOW-UP (OUTPATIENT)
Dept: URGENT CARE | Facility: CLINIC | Age: 39
End: 2024-12-13

## 2024-12-13 ENCOUNTER — DOCUMENTATION (OUTPATIENT)
Dept: URGENT CARE | Facility: CLINIC | Age: 39
End: 2024-12-13

## 2024-12-13 LAB — BACTERIA THROAT CULT: ABNORMAL

## 2024-12-13 NOTE — PROGRESS NOTES
[place] called about her abnormal strep, I spoke with SOREN Suarez about it and told the patient that she doesn't need antibiotics at this time because it is a viral illness

## 2024-12-13 NOTE — RESULT ENCOUNTER NOTE
Throat Culture   Few Colonies of Beta Hemolytic Streptococcus NOT Group A Abnormal     See note in EMR by JUDY Hugo.    Pt spoke with her regarding strep results.   Education discussed on viral symptoms.

## 2024-12-26 ENCOUNTER — OFFICE VISIT (OUTPATIENT)
Dept: URGENT CARE | Facility: CLINIC | Age: 39
End: 2024-12-26
Payer: COMMERCIAL

## 2024-12-26 VITALS
WEIGHT: 149 LBS | BODY MASS INDEX: 26.39 KG/M2 | HEART RATE: 80 BPM | RESPIRATION RATE: 18 BRPM | OXYGEN SATURATION: 99 % | TEMPERATURE: 98.3 F | SYSTOLIC BLOOD PRESSURE: 130 MMHG | DIASTOLIC BLOOD PRESSURE: 80 MMHG

## 2024-12-26 DIAGNOSIS — J06.9 URI WITH COUGH AND CONGESTION: Primary | ICD-10-CM

## 2024-12-26 DIAGNOSIS — Z72.0 TOBACCO USE: ICD-10-CM

## 2024-12-26 DIAGNOSIS — J02.8 ACUTE PHARYNGITIS DUE TO OTHER SPECIFIED ORGANISMS: ICD-10-CM

## 2024-12-26 LAB — S PYO AG THROAT QL: NEGATIVE

## 2024-12-26 PROCEDURE — 87880 STREP A ASSAY W/OPTIC: CPT | Performed by: NURSE PRACTITIONER

## 2024-12-26 PROCEDURE — 99214 OFFICE O/P EST MOD 30 MIN: CPT | Performed by: NURSE PRACTITIONER

## 2024-12-26 PROCEDURE — 87070 CULTURE OTHR SPECIMN AEROBIC: CPT | Performed by: NURSE PRACTITIONER

## 2024-12-26 NOTE — PROGRESS NOTES
"  [facility]        NAME: Olya Kimbrough is a 39 y.o. female  : 1985    MRN: [id number]  DATE: 2024  TIME: 9:52 AM    Assessment and Plan   URI with cough and congestion [J06.9]  1. URI with cough and congestion  amoxicillin-clavulanate (AUGMENTIN) 875-125 mg per tablet      2. Acute pharyngitis due to other specified organisms  POCT rapid ANTIGEN strepA    amoxicillin-clavulanate (AUGMENTIN) 875-125 mg per tablet    Throat culture      3. Tobacco use  amoxicillin-clavulanate (AUGMENTIN) 875-125 mg per tablet            Patient Instructions       Follow up with PCP in 3-5 days.  Proceed to  ER if symptoms worsen.    If tests have been performed at [facility], our office will contact you with results if changes need to be made to the care plan discussed with you at the visit.  You can review your full results on [facility] MyChart.    Your strep A is negative.   You are to do warm salt water gargles 4 x daily.  Drink warm tea with honey and lemon.  Take tylenol or motrin as able for pain or fever.  Chloraseptic throat spray, cough drops.  Do not share utensils.  Change your tooth brush in 3 days.  Follow up with your PCP in 2-3 days  Go to the ED if symptoms worsen      Stop smoking    Your symptoms appear viral however you have requested antibiotics.  Antibiotics are prescribed for bacterial infection; your assessment today does not reveal bacterial infection.   You have been prescribed cefdinir which would cover strep and respiratory infection - take all as prescribed.     Cool mist humidifier, push fluids       Chief Complaint     Chief Complaint   Patient presents with    Sore Throat     X one week , had amoxicillin at home and took 5 days worth    Generalized Body Aches         History of Present Illness       This is a 39 year old female who comes to [facility] with cough, sorethroat and bodyaches since 2024.  She states she \"never got better after being seen then\".  She states she " has not taken anything for her symptoms for the cough or bodyaches. She does admit that she had 5 days of amoxicillin left from a previous prescription and took it. She states she felt better on the amoxicillin and now is sick again. She states her  is sick with similar.  She had a negative covid/flu on 12/11 and strep showed only a few colonies of not strep A.  She was diagnosed with viral syndrome.  Pt does smoke.  She denies pregnancy.   She denies vomiting/diarrhea or fevers.  She states she can't stand the sorethroat or cough.  PMH is listed and reviewed.     Sore Throat   Associated symptoms include coughing.   Generalized Body Aches  Associated symptoms include a sore throat and coughing.       Review of Systems   Review of Systems   Constitutional: Negative.    HENT:  Positive for sore throat.    Eyes: Negative.    Respiratory:  Positive for cough.    Cardiovascular: Negative.    Gastrointestinal: Negative.    Endocrine: Negative.    Genitourinary: Negative.    Musculoskeletal: Negative.    Skin: Negative.    Allergic/Immunologic: Negative.    Neurological: Negative.    Hematological: Negative.    Psychiatric/Behavioral: Negative.           Current Medications       Current Outpatient Medications:     amoxicillin-clavulanate (AUGMENTIN) 875-125 mg per tablet, Take 1 tablet by mouth every 12 (twelve) hours for 7 days, Disp: 14 tablet, Rfl: 0    ALPRAZolam (XANAX) 0.5 mg tablet, , Disp: , Rfl:     benzonatate (TESSALON) 200 MG capsule, Take 1 capsule (200 mg total) by mouth 3 (three) times a day as needed for cough (Patient not taking: Reported on 7/14/2023), Disp: 20 capsule, Rfl: 0    chlorhexidine (PERIDEX) 0.12 % solution, Apply 15 mL to the mouth or throat 2 (two) times a day (Patient not taking: Reported on 7/21/2024), Disp: 473 mL, Rfl: 0    cholecalciferol (VITAMIN D3) 25 mcg (1,000 units) tablet, , Disp: , Rfl:     escitalopram (LEXAPRO) 5 mg tablet, Take 1 tablet (5 mg total) by mouth daily  (Patient not taking: Reported on 10/8/2024), Disp: 30 tablet, Rfl: 1    ferrous sulfate 325 (65 Fe) mg tablet, Take 325 mg by mouth daily with breakfast (Patient not taking: Reported on 10/8/2024), Disp: , Rfl:     fluticasone (FLONASE) 50 mcg/act nasal spray, 2 sprays into each nostril daily (Patient not taking: Reported on 10/8/2024), Disp: 1 g, Rfl: 0    hydrOXYzine HCL (ATARAX) 25 mg tablet, Take 1 tablet (25 mg total) by mouth every 6 (six) hours as needed for anxiety (Patient not taking: Reported on 10/8/2024), Disp: 40 tablet, Rfl: 0    ibuprofen (MOTRIN) 800 mg tablet, , Disp: , Rfl:     iron polysaccharides (FERREX) 150 mg capsule, Take 150 mg by mouth daily (Patient not taking: Reported on 11/3/2023), Disp: , Rfl:     loratadine (CLARITIN) 10 mg tablet, Take 1 tablet (10 mg total) by mouth daily (Patient not taking: Reported on 10/8/2024), Disp: 30 tablet, Rfl: 0    LORazepam (ATIVAN) 1 mg tablet, , Disp: , Rfl:     LORazepam (ATIVAN) 1 mg tablet, Take 1 tablet (1 mg total) by mouth daily as needed for anxiety, Disp: 4 tablet, Rfl: 0    Multiple Vitamin (MULTI-DAY PO), Take by mouth (Patient not taking: Reported on 10/8/2024), Disp: , Rfl:     Omega-3 Fatty Acids (FISH OIL PO), Take 2 g by mouth, Disp: , Rfl:     omeprazole (PriLOSEC) 20 mg delayed release capsule, Take 1 capsule (20 mg total) by mouth daily (Patient not taking: Reported on 10/8/2024), Disp: 30 capsule, Rfl: 0    pantoprazole (PROTONIX) 40 mg tablet, , Disp: , Rfl:     QUEtiapine (SEROquel) 25 mg tablet, Take 1 tablet (25 mg total) by mouth daily at bedtime (Patient not taking: Reported on 10/8/2024), Disp: 30 tablet, Rfl: 1    venlafaxine (EFFEXOR-XR) 37.5 mg 24 hr capsule, , Disp: , Rfl:     Current Allergies     Allergies as of 12/26/2024 - Reviewed 12/26/2024   Allergen Reaction Noted    Metronidazole GI Intolerance 03/09/2018    Azithromycin Palpitations 04/03/2017    Tramadol Palpitations 04/04/2016            The following portions  of the patient's history were reviewed and updated as appropriate: allergies, current medications, past family history, past medical history, past social history, past surgical history and problem list.     Past Medical History:   Diagnosis Date    Anxiety     Depression        Past Surgical History:   Procedure Laterality Date     SECTION      DILATION AND CURETTAGE, DIAGNOSTIC / THERAPEUTIC         History reviewed. No pertinent family history.      Medications have been verified.        Objective   /80   Pulse 80   Temp 98.3 °F (36.8 °C)   Resp 18   Wt 67.6 kg (149 lb)   SpO2 99%   BMI 26.39 kg/m²   No LMP recorded.       Physical Exam     Physical Exam  Vitals and nursing note reviewed.   Constitutional:       General: She is not in acute distress.     Appearance: She is well-developed and normal weight. She is not ill-appearing, toxic-appearing or diaphoretic.   HENT:      Head: Normocephalic and atraumatic.      Right Ear: Tympanic membrane and ear canal normal.      Left Ear: Tympanic membrane and ear canal normal.      Nose: No congestion.      Mouth/Throat:      Mouth: Mucous membranes are moist. No oral lesions.      Pharynx: No pharyngeal swelling, oropharyngeal exudate, posterior oropharyngeal erythema or uvula swelling.      Tonsils: No tonsillar exudate or tonsillar abscesses.      Comments: Injected   + PND    Eyes:      Extraocular Movements:      Right eye: Normal extraocular motion.      Left eye: Normal extraocular motion.   Cardiovascular:      Rate and Rhythm: Normal rate and regular rhythm.      Heart sounds: Normal heart sounds. No murmur heard.  Pulmonary:      Effort: Pulmonary effort is normal. No respiratory distress.      Breath sounds: Normal breath sounds. No stridor. No wheezing, rhonchi or rales.      Comments: Dry cough   Chest:      Chest wall: No tenderness.   Musculoskeletal:      Cervical back: Normal range of motion and neck supple.   Lymphadenopathy:       "Cervical: No cervical adenopathy.   Skin:     General: Skin is warm and dry.      Capillary Refill: Capillary refill takes less than 2 seconds.   Neurological:      General: No focal deficit present.      Mental Status: She is alert and oriented to person, place, and time.   Psychiatric:         Mood and Affect: Mood normal.         Behavior: Behavior normal.         Attempted to get poct strep A (by provider), pt gagged and started to cough profusely.  Swab is dry.  Will have RN attempt swab.    RN able to get POCT strep swab.   Pt has declined CXR  /80   Pulse 80   Temp 98.3 °F (36.8 °C)   Resp 18   Wt 67.6 kg (149 lb)   SpO2 99%   BMI 26.39 kg/m²       Pt is heard speaking with nurse \"I know my body needs a stronger antibiotic\".  Pt is persistent on getting antibiotic.  She has declined prednisone she states \"it makes her very anxious\".    She is allergic to azithromycin.  Will prescribe Augmentin which would cover strep and respiratory infection.     Educated on the fact that pt has PND with sorethroat and dry cough.   Pt persists on abx.             "

## 2024-12-26 NOTE — PATIENT INSTRUCTIONS
Your strep A is negative.   You are to do warm salt water gargles 4 x daily.  Drink warm tea with honey and lemon.  Take tylenol or motrin as able for pain or fever.  Chloraseptic throat spray, cough drops.  Do not share utensils.  Change your tooth brush in 3 days.  Follow up with your PCP in 2-3 days  Go to the ED if symptoms worsen      Stop smoking    Your symptoms appear viral however you have requested antibiotics.  Antibiotics are prescribed for bacterial infection; your assessment today does not reveal bacterial infection.   You have been prescribed cefdinir which would cover strep and respiratory infection - take all as prescribed.   You have been prescribed augmentin. You have been prescribed an antibiotic - you are to take an oral probiotic and eat yogurt to avoid GI issues/diarrhea.      Cool mist humidifier, push fluids

## 2024-12-28 LAB — BACTERIA THROAT CULT: NORMAL

## 2025-02-10 ENCOUNTER — OFFICE VISIT (OUTPATIENT)
Dept: URGENT CARE | Facility: CLINIC | Age: 40
End: 2025-02-10
Payer: COMMERCIAL

## 2025-02-10 VITALS
BODY MASS INDEX: 25.69 KG/M2 | OXYGEN SATURATION: 97 % | WEIGHT: 145 LBS | TEMPERATURE: 98.4 F | DIASTOLIC BLOOD PRESSURE: 78 MMHG | SYSTOLIC BLOOD PRESSURE: 124 MMHG | HEART RATE: 81 BPM | HEIGHT: 63 IN | RESPIRATION RATE: 20 BRPM

## 2025-02-10 DIAGNOSIS — J02.9 SORE THROAT: Primary | ICD-10-CM

## 2025-02-10 LAB — S PYO AG THROAT QL: NEGATIVE

## 2025-02-10 PROCEDURE — 99213 OFFICE O/P EST LOW 20 MIN: CPT

## 2025-02-10 PROCEDURE — 87880 STREP A ASSAY W/OPTIC: CPT

## 2025-02-10 PROCEDURE — 87070 CULTURE OTHR SPECIMN AEROBIC: CPT

## 2025-02-10 NOTE — PROGRESS NOTES
[facility]        NAME: Olya Kimbrough is a 39 y.o. female  : 1985    MRN: [id number]  DATE: February 10, 2025  TIME: 5:16 PM    Assessment and Plan   Sore throat [J02.9]  1. Sore throat  POCT rapid ANTIGEN strepA    Throat culture    Throat culture        Rapid strep in office negative.  Will send throat culture.  Recommend supportive care at this time with close PCP follow-up for no improvement or worsening of symptoms.  Patient educated on red flag symptoms and when to proceed to the ED.  Patient agreeable and understands current treatment plan.  Work note provided.    Patient Instructions     Patient Instructions   Your rapid strep test in office was negative. No antibiotic is indicated at this time. However, a throat swab will be sent for definitive culture.     Results take approximately 24-48 hours to return and may be viewed on [facility]. Our office will reach out to you directly with any abnormal results or if changes need to be made to your plan of care. You may call us with any questions regarding your results.     In the meantime, you can try warm salt water gargles, ice chips, tea with honey, lozenges, and/or OTC chloraseptic spray. Tylenol and Ibuprofen may also be used to help alleviate throat pain.      If your symptoms do not improve with our current treatment plan or worsen, please schedule an appointment with your PCP or proceed to the ED.        Follow up with PCP in 3-5 days.  Proceed to  ER if symptoms worsen.    Chief Complaint     Chief Complaint   Patient presents with   • Sore Throat     Starting today, possible fever-unknown. Son +strep.          History of Present Illness       39-year-old female presents to the clinic for evaluation of sore throat x 1 day.  Patient reports she woke up this morning and her throat felt scratchy.   Her throat has progressively gotten more sore throughout the day.  She endorses some mild pain with swallowing and headache.  She denies  any fevers, chills, sinus congestion, ear pain, runny nose, shortness of breath, cough, chest pain, palpitations, nausea, vomiting, diarrhea, body aches or lightheadedness.  Patient denies taking any OTC medications for symptoms at this time.  Of note, patient reports her son is currently sick at home with strep.        Sore Throat   Associated symptoms include headaches and trouble swallowing (painful). Pertinent negatives include no congestion, coughing, diarrhea, ear pain, shortness of breath or vomiting.       Review of Systems   Review of Systems   Constitutional:  Negative for chills and fever.   HENT:  Positive for sore throat and trouble swallowing (painful). Negative for congestion, ear pain and rhinorrhea.    Respiratory:  Negative for cough and shortness of breath.    Cardiovascular:  Negative for chest pain and palpitations.   Gastrointestinal:  Negative for diarrhea, nausea and vomiting.   Genitourinary:  Negative for decreased urine volume.   Musculoskeletal:  Negative for arthralgias and myalgias.   Neurological:  Positive for headaches. Negative for light-headedness.         Current Medications       Current Outpatient Medications:   •  LORazepam (ATIVAN) 1 mg tablet, Take 1 tablet (1 mg total) by mouth daily as needed for anxiety, Disp: 4 tablet, Rfl: 0  •  Omega-3 Fatty Acids (FISH OIL PO), Take 2 g by mouth, Disp: , Rfl:   •  ALPRAZolam (XANAX) 0.5 mg tablet, , Disp: , Rfl:   •  benzonatate (TESSALON) 200 MG capsule, Take 1 capsule (200 mg total) by mouth 3 (three) times a day as needed for cough (Patient not taking: Reported on 7/14/2023), Disp: 20 capsule, Rfl: 0  •  chlorhexidine (PERIDEX) 0.12 % solution, Apply 15 mL to the mouth or throat 2 (two) times a day (Patient not taking: Reported on 7/21/2024), Disp: 473 mL, Rfl: 0  •  cholecalciferol (VITAMIN D3) 25 mcg (1,000 units) tablet, , Disp: , Rfl:   •  escitalopram (LEXAPRO) 5 mg tablet, Take 1 tablet (5 mg total) by mouth daily (Patient not  taking: Reported on 10/8/2024), Disp: 30 tablet, Rfl: 1  •  ferrous sulfate 325 (65 Fe) mg tablet, Take 325 mg by mouth daily with breakfast (Patient not taking: Reported on 10/8/2024), Disp: , Rfl:   •  fluticasone (FLONASE) 50 mcg/act nasal spray, 2 sprays into each nostril daily (Patient not taking: Reported on 10/8/2024), Disp: 1 g, Rfl: 0  •  hydrOXYzine HCL (ATARAX) 25 mg tablet, Take 1 tablet (25 mg total) by mouth every 6 (six) hours as needed for anxiety (Patient not taking: Reported on 10/8/2024), Disp: 40 tablet, Rfl: 0  •  ibuprofen (MOTRIN) 800 mg tablet, , Disp: , Rfl:   •  iron polysaccharides (FERREX) 150 mg capsule, Take 150 mg by mouth daily (Patient not taking: Reported on 11/3/2023), Disp: , Rfl:   •  loratadine (CLARITIN) 10 mg tablet, Take 1 tablet (10 mg total) by mouth daily (Patient not taking: Reported on 10/8/2024), Disp: 30 tablet, Rfl: 0  •  LORazepam (ATIVAN) 1 mg tablet, , Disp: , Rfl:   •  Multiple Vitamin (MULTI-DAY PO), Take by mouth (Patient not taking: Reported on 10/8/2024), Disp: , Rfl:   •  omeprazole (PriLOSEC) 20 mg delayed release capsule, Take 1 capsule (20 mg total) by mouth daily (Patient not taking: Reported on 10/8/2024), Disp: 30 capsule, Rfl: 0  •  pantoprazole (PROTONIX) 40 mg tablet, , Disp: , Rfl:   •  QUEtiapine (SEROquel) 25 mg tablet, Take 1 tablet (25 mg total) by mouth daily at bedtime (Patient not taking: Reported on 10/8/2024), Disp: 30 tablet, Rfl: 1  •  venlafaxine (EFFEXOR-XR) 37.5 mg 24 hr capsule, , Disp: , Rfl:     Current Allergies     Allergies as of 02/10/2025 - Reviewed 02/10/2025   Allergen Reaction Noted   • Metronidazole GI Intolerance 03/09/2018   • Azithromycin Palpitations 04/03/2017   • Tramadol Palpitations 04/04/2016            The following portions of the patient's history were reviewed and updated as appropriate: allergies, current medications, past family history, past medical history, past social history, past surgical history and  "problem list.     Past Medical History:   Diagnosis Date   • Anxiety    • Depression        Past Surgical History:   Procedure Laterality Date   •  SECTION     • DILATION AND CURETTAGE, DIAGNOSTIC / THERAPEUTIC         History reviewed. No pertinent family history.      Medications have been verified.        Objective   /78   Pulse 81   Temp 98.4 °F (36.9 °C)   Resp 20   Ht 5' 3\" (1.6 m)   Wt 65.8 kg (145 lb)   SpO2 97%   BMI 25.69 kg/m²        Physical Exam     Physical Exam  Vitals and nursing note reviewed.   Constitutional:       Appearance: She is well-developed.   HENT:      Head: Normocephalic and atraumatic.      Right Ear: Tympanic membrane and ear canal normal.      Left Ear: Tympanic membrane and ear canal normal.      Nose: No congestion or rhinorrhea.      Mouth/Throat:      Mouth: Mucous membranes are moist. No oral lesions.      Pharynx: Oropharynx is clear. Uvula midline. Posterior oropharyngeal erythema present. No pharyngeal swelling, oropharyngeal exudate or uvula swelling.      Tonsils: No tonsillar exudate or tonsillar abscesses. 1+ on the right. 1+ on the left.   Eyes:      Conjunctiva/sclera: Conjunctivae normal.   Cardiovascular:      Rate and Rhythm: Normal rate and regular rhythm.      Heart sounds: Normal heart sounds.   Pulmonary:      Effort: Pulmonary effort is normal.      Breath sounds: Normal breath sounds.   Abdominal:      General: Bowel sounds are normal.      Palpations: Abdomen is soft.   Lymphadenopathy:      Cervical: No cervical adenopathy.   Skin:     General: Skin is warm and dry.   Neurological:      General: No focal deficit present.      Mental Status: She is alert.   Psychiatric:         Mood and Affect: Mood normal.         Behavior: Behavior normal.                   "

## 2025-02-10 NOTE — PATIENT INSTRUCTIONS
Your rapid strep test in office was negative. No antibiotic is indicated at this time. However, a throat swab will be sent for definitive culture.     Results take approximately 24-48 hours to return and may be viewed on [facility]. Our office will reach out to you directly with any abnormal results or if changes need to be made to your plan of care. You may call us with any questions regarding your results.     In the meantime, you can try warm salt water gargles, ice chips, tea with honey, lozenges, and/or OTC chloraseptic spray. Tylenol and Ibuprofen may also be used to help alleviate throat pain.      If your symptoms do not improve with our current treatment plan or worsen, please schedule an appointment with your PCP or proceed to the ED.

## 2025-02-10 NOTE — LETTER
February 10, 2025     Patient: Olya Kimbrough   YOB: 1985   Date of Visit: 2/10/2025       To Whom it May Concern:    Olya Kimbrough was seen in my clinic on 2/10/2025. Please excuse her from work on 2/11/2025. She may return to work on 2/12/2025 .    If you have any questions or concerns, please don't hesitate to call.         Sincerely,          BRANDON Chiu        CC: No Recipients

## 2025-02-12 LAB — BACTERIA THROAT CULT: NORMAL

## 2025-03-17 ENCOUNTER — OFFICE VISIT (OUTPATIENT)
Dept: URGENT CARE | Facility: CLINIC | Age: 40
End: 2025-03-17
Payer: COMMERCIAL

## 2025-03-17 VITALS
DIASTOLIC BLOOD PRESSURE: 77 MMHG | HEART RATE: 78 BPM | RESPIRATION RATE: 20 BRPM | TEMPERATURE: 98 F | OXYGEN SATURATION: 98 % | SYSTOLIC BLOOD PRESSURE: 134 MMHG

## 2025-03-17 DIAGNOSIS — N30.01 ACUTE CYSTITIS WITH HEMATURIA: Primary | ICD-10-CM

## 2025-03-17 LAB
SL AMB  POCT GLUCOSE, UA: ABNORMAL
SL AMB LEUKOCYTE ESTERASE,UA: ABNORMAL
SL AMB POCT BILIRUBIN,UA: ABNORMAL
SL AMB POCT BLOOD,UA: ABNORMAL
SL AMB POCT CLARITY,UA: ABNORMAL
SL AMB POCT COLOR,UA: ABNORMAL
SL AMB POCT KETONES,UA: ABNORMAL
SL AMB POCT NITRITE,UA: ABNORMAL
SL AMB POCT PH,UA: 7
SL AMB POCT SPECIFIC GRAVITY,UA: 1015
SL AMB POCT URINE PROTEIN: 30
SL AMB POCT UROBILINOGEN: 0.2

## 2025-03-17 PROCEDURE — 81002 URINALYSIS NONAUTO W/O SCOPE: CPT

## 2025-03-17 PROCEDURE — 87186 SC STD MICRODIL/AGAR DIL: CPT

## 2025-03-17 PROCEDURE — 87147 CULTURE TYPE IMMUNOLOGIC: CPT

## 2025-03-17 PROCEDURE — 87086 URINE CULTURE/COLONY COUNT: CPT

## 2025-03-17 PROCEDURE — 99213 OFFICE O/P EST LOW 20 MIN: CPT

## 2025-03-17 PROCEDURE — 87077 CULTURE AEROBIC IDENTIFY: CPT

## 2025-03-17 RX ORDER — PHENAZOPYRIDINE HYDROCHLORIDE 200 MG/1
200 TABLET, FILM COATED ORAL
Qty: 10 TABLET | Refills: 0 | Status: CANCELLED | OUTPATIENT
Start: 2025-03-17

## 2025-03-17 RX ORDER — NITROFURANTOIN 25; 75 MG/1; MG/1
100 CAPSULE ORAL 2 TIMES DAILY
Qty: 14 CAPSULE | Refills: 0 | Status: SHIPPED | OUTPATIENT
Start: 2025-03-17 | End: 2025-03-24

## 2025-03-17 NOTE — LETTER
March 17, 2025     Patient: Olya Kimbrough  YOB: 1985  Date of Visit: 3/17/2025      To Whom it May Concern:    Olya Kimbrough is under my professional care. Olya was seen in my office on 3/17/2025. Olya may return to work on 3/17/2025 .    If you have any questions or concerns, please don't hesitate to call.         Sincerely,          BRANDON Middleton        CC: No Recipients

## 2025-03-17 NOTE — PROGRESS NOTES
"  [facility]        NAME: Olya Kimbrough is a 39 y.o. female  : 1985    MRN: [id number]  DATE: 2025  TIME: 8:26 AM    Assessment and Plan   Acute cystitis with hematuria [N30.01]  1. Acute cystitis with hematuria  POCT urine dip    Urine culture    Urine culture    nitrofurantoin (MACROBID) 100 mg capsule        Urine dip + leuks and nonhemolyzed blood  Pt reporting burning, urgency, bladder pressure. Symptoms started after she and her  ahd sex on Saturday. Pt reports getting BV a lot.   Pt reports she normally takes bactrim for her UTI symptoms when provider related she would be placed on macrobid. Pt reports she does not think she was ever on macrobid before and does not want to take an antibiotic that is new to her stating \"I'm weird with my antibiotics.\" Relayed to patient bactrim is not first line and I will be sending macrobid bid x7 days. We will send a urine culture and await final results. If results indicate a change is needed to abx we will call her and change the medication at that time. If we do not call she was instructed to complete the full course of the abx and follow up with gyn or pcp if symptoms persist.    Patient Instructions       Follow up with PCP in 3-5 days.  Proceed to  ER if symptoms worsen.    If tests are performed, our office will contact you with results only if changes need to made to the care plan discussed with you at the visit. [facility]    Chief Complaint     Chief Complaint   Patient presents with    Possible UTI         History of Present Illness       39-year-old female past medical history significant for anxiety and depression presents to this clinic with complaint of UTI symptoms.  She relates she and her  had sexual relations and she started with urinary symptoms afterward.  She describes urinary burning, urgency, frequency, and bladder pressure.  She denies fever, chills, flank pain, pain " in or around the kidney areas.        Review of Systems   Review of Systems   Constitutional:  Negative for chills and fever.   Genitourinary:  Positive for dyspareunia, dysuria, frequency and urgency. Negative for flank pain.         Current Medications       Current Outpatient Medications:     nitrofurantoin (MACROBID) 100 mg capsule, Take 1 capsule (100 mg total) by mouth 2 (two) times a day for 7 days, Disp: 14 capsule, Rfl: 0    ALPRAZolam (XANAX) 0.5 mg tablet, , Disp: , Rfl:     benzonatate (TESSALON) 200 MG capsule, Take 1 capsule (200 mg total) by mouth 3 (three) times a day as needed for cough (Patient not taking: Reported on 7/14/2023), Disp: 20 capsule, Rfl: 0    chlorhexidine (PERIDEX) 0.12 % solution, Apply 15 mL to the mouth or throat 2 (two) times a day (Patient not taking: Reported on 7/21/2024), Disp: 473 mL, Rfl: 0    cholecalciferol (VITAMIN D3) 25 mcg (1,000 units) tablet, , Disp: , Rfl:     escitalopram (LEXAPRO) 5 mg tablet, Take 1 tablet (5 mg total) by mouth daily (Patient not taking: Reported on 10/8/2024), Disp: 30 tablet, Rfl: 1    ferrous sulfate 325 (65 Fe) mg tablet, Take 325 mg by mouth daily with breakfast (Patient not taking: Reported on 10/8/2024), Disp: , Rfl:     fluticasone (FLONASE) 50 mcg/act nasal spray, 2 sprays into each nostril daily (Patient not taking: Reported on 10/8/2024), Disp: 1 g, Rfl: 0    hydrOXYzine HCL (ATARAX) 25 mg tablet, Take 1 tablet (25 mg total) by mouth every 6 (six) hours as needed for anxiety (Patient not taking: Reported on 10/8/2024), Disp: 40 tablet, Rfl: 0    ibuprofen (MOTRIN) 800 mg tablet, , Disp: , Rfl:     iron polysaccharides (FERREX) 150 mg capsule, Take 150 mg by mouth daily (Patient not taking: Reported on 11/3/2023), Disp: , Rfl:     loratadine (CLARITIN) 10 mg tablet, Take 1 tablet (10 mg total) by mouth daily (Patient not taking: Reported on 10/8/2024), Disp: 30 tablet, Rfl: 0    LORazepam (ATIVAN) 1 mg tablet, , Disp: , Rfl:      LORazepam (ATIVAN) 1 mg tablet, Take 1 tablet (1 mg total) by mouth daily as needed for anxiety, Disp: 4 tablet, Rfl: 0    Multiple Vitamin (MULTI-DAY PO), Take by mouth (Patient not taking: Reported on 10/8/2024), Disp: , Rfl:     Omega-3 Fatty Acids (FISH OIL PO), Take 2 g by mouth, Disp: , Rfl:     omeprazole (PriLOSEC) 20 mg delayed release capsule, Take 1 capsule (20 mg total) by mouth daily (Patient not taking: Reported on 10/8/2024), Disp: 30 capsule, Rfl: 0    pantoprazole (PROTONIX) 40 mg tablet, , Disp: , Rfl:     QUEtiapine (SEROquel) 25 mg tablet, Take 1 tablet (25 mg total) by mouth daily at bedtime (Patient not taking: Reported on 10/8/2024), Disp: 30 tablet, Rfl: 1    venlafaxine (EFFEXOR-XR) 37.5 mg 24 hr capsule, , Disp: , Rfl:     Current Allergies     Allergies as of 2025 - Reviewed 2025   Allergen Reaction Noted    Metronidazole GI Intolerance 2018    Azithromycin Palpitations 2017    Tramadol Palpitations 2016            The following portions of the patient's history were reviewed and updated as appropriate: allergies, current medications, past family history, past medical history, past social history, past surgical history and problem list.     Past Medical History:   Diagnosis Date    Anxiety     Depression        Past Surgical History:   Procedure Laterality Date     SECTION      DILATION AND CURETTAGE, DIAGNOSTIC / THERAPEUTIC         History reviewed. No pertinent family history.      Medications have been verified.        Objective   /77   Pulse 78   Temp 98 °F (36.7 °C)   Resp 20   SpO2 98%        Physical Exam     Physical Exam  Vitals and nursing note reviewed.   Constitutional:       Appearance: Normal appearance.   HENT:      Head: Normocephalic and atraumatic.   Cardiovascular:      Rate and Rhythm: Normal rate and regular rhythm.      Pulses: Normal pulses.      Heart sounds: Normal heart sounds.   Pulmonary:      Effort: Pulmonary  effort is normal.      Breath sounds: Normal breath sounds.   Abdominal:      General: Abdomen is protuberant. Bowel sounds are normal.      Palpations: Abdomen is soft.      Tenderness: There is abdominal tenderness (pressure) in the suprapubic area.   Musculoskeletal:         General: Normal range of motion.      Cervical back: Normal range of motion and neck supple.   Skin:     General: Skin is warm and dry.      Capillary Refill: Capillary refill takes less than 2 seconds.   Neurological:      General: No focal deficit present.      Mental Status: She is alert.

## 2025-03-18 ENCOUNTER — RESULTS FOLLOW-UP (OUTPATIENT)
Dept: URGENT CARE | Facility: CLINIC | Age: 40
End: 2025-03-18

## 2025-03-18 DIAGNOSIS — N30.00 ACUTE CYSTITIS WITHOUT HEMATURIA: Primary | ICD-10-CM

## 2025-03-19 LAB
BACTERIA UR CULT: ABNORMAL
BACTERIA UR CULT: ABNORMAL

## 2025-03-19 NOTE — RESULT ENCOUNTER NOTE
Pt called and wanted to discuss urine results.   Urine Culture   >100,000 cfu/ml Escherichia coli Abnormal   <10,000 cfu/ml Beta Hemolytic Streptococcus Group B Abnormal   Pt was prescribed macrobid    LM on VM for pt to return call

## 2025-03-19 NOTE — TELEPHONE ENCOUNTER
Pt called and both bacterias were discussed with her.  She is requesting something that covers both bacteria.  Augmentin is the closest but is only intermediate - pt is agreeable to this.   She is instructed to stop nitrofurantoin and follow up with PCP.   She verbalizes understanding.

## 2025-04-29 ENCOUNTER — OFFICE VISIT (OUTPATIENT)
Dept: URGENT CARE | Facility: CLINIC | Age: 40
End: 2025-04-29
Payer: COMMERCIAL

## 2025-04-29 VITALS
OXYGEN SATURATION: 98 % | SYSTOLIC BLOOD PRESSURE: 116 MMHG | DIASTOLIC BLOOD PRESSURE: 66 MMHG | HEART RATE: 82 BPM | TEMPERATURE: 98.1 F | RESPIRATION RATE: 18 BRPM

## 2025-04-29 DIAGNOSIS — H69.92 EUSTACHIAN TUBE DYSFUNCTION, LEFT: Primary | ICD-10-CM

## 2025-04-29 PROCEDURE — 99213 OFFICE O/P EST LOW 20 MIN: CPT | Performed by: STUDENT IN AN ORGANIZED HEALTH CARE EDUCATION/TRAINING PROGRAM

## 2025-04-29 RX ORDER — LORATADINE 10 MG/1
10 TABLET ORAL DAILY
Qty: 30 TABLET | Refills: 1 | Status: SHIPPED | OUTPATIENT
Start: 2025-04-29

## 2025-04-29 NOTE — PROGRESS NOTES
[facility]        NAME: Olya Kimbrough is a 39 y.o. female  : 1985    MRN: [id number]  DATE: 2025  TIME: 6:47 PM    Assessment and Plan   Eustachian tube dysfunction, left [H69.92]  1. Eustachian tube dysfunction, left  loratadine (CLARITIN) 10 mg tablet        This is likely related to allergies/eustachian tube dysfunction.  Will treat with antihistamine.  Follow-up with PCP    Patient Instructions       Follow up with PCP in 3-5 days.  Proceed to  ER if symptoms worsen.    If tests have been performed at [facility], our office will contact you with results if changes need to be made to the care plan discussed with you at the visit.  You can review your full results on [facility].    Chief Complaint     Chief Complaint   Patient presents with    Earache     Left ear pain / pressure 2 days          History of Present Illness       Earache   There is pain in the left ear. This is a new problem. The current episode started in the past 7 days. The problem has been unchanged. There has been no fever. Pain severity now: Feeling of muffled/blocked ear the left side. Pertinent negatives include no abdominal pain, coughing, diarrhea, ear discharge, headaches, hearing loss, neck pain, rash, rhinorrhea, sore throat or vomiting. She has tried nothing for the symptoms. The treatment provided no relief.       Review of Systems   Review of Systems   HENT:  Positive for ear pain. Negative for ear discharge, hearing loss, rhinorrhea and sore throat.    Respiratory:  Negative for cough.    Gastrointestinal:  Negative for abdominal pain, diarrhea and vomiting.   Musculoskeletal:  Negative for neck pain.   Skin:  Negative for rash.   Neurological:  Negative for headaches.       As stated in HPI      Current Medications       Current Outpatient Medications:     loratadine (CLARITIN) 10 mg tablet, Take 1 tablet (10 mg total) by mouth daily, Disp: 30 tablet, Rfl: 1    LORazepam (ATIVAN) 1 mg tablet, Take 1  tablet (1 mg total) by mouth daily as needed for anxiety, Disp: 4 tablet, Rfl: 0    ALPRAZolam (XANAX) 0.5 mg tablet, , Disp: , Rfl:     benzonatate (TESSALON) 200 MG capsule, Take 1 capsule (200 mg total) by mouth 3 (three) times a day as needed for cough (Patient not taking: Reported on 7/14/2023), Disp: 20 capsule, Rfl: 0    chlorhexidine (PERIDEX) 0.12 % solution, Apply 15 mL to the mouth or throat 2 (two) times a day (Patient not taking: Reported on 7/21/2024), Disp: 473 mL, Rfl: 0    cholecalciferol (VITAMIN D3) 25 mcg (1,000 units) tablet, , Disp: , Rfl:     escitalopram (LEXAPRO) 5 mg tablet, Take 1 tablet (5 mg total) by mouth daily (Patient not taking: Reported on 10/8/2024), Disp: 30 tablet, Rfl: 1    ferrous sulfate 325 (65 Fe) mg tablet, Take 325 mg by mouth daily with breakfast (Patient not taking: Reported on 10/8/2024), Disp: , Rfl:     fluticasone (FLONASE) 50 mcg/act nasal spray, 2 sprays into each nostril daily (Patient not taking: Reported on 10/8/2024), Disp: 1 g, Rfl: 0    hydrOXYzine HCL (ATARAX) 25 mg tablet, Take 1 tablet (25 mg total) by mouth every 6 (six) hours as needed for anxiety (Patient not taking: Reported on 10/8/2024), Disp: 40 tablet, Rfl: 0    ibuprofen (MOTRIN) 800 mg tablet, , Disp: , Rfl:     iron polysaccharides (FERREX) 150 mg capsule, Take 150 mg by mouth daily (Patient not taking: Reported on 11/3/2023), Disp: , Rfl:     LORazepam (ATIVAN) 1 mg tablet, , Disp: , Rfl:     Multiple Vitamin (MULTI-DAY PO), Take by mouth (Patient not taking: Reported on 10/8/2024), Disp: , Rfl:     Omega-3 Fatty Acids (FISH OIL PO), Take 2 g by mouth, Disp: , Rfl:     omeprazole (PriLOSEC) 20 mg delayed release capsule, Take 1 capsule (20 mg total) by mouth daily (Patient not taking: Reported on 10/8/2024), Disp: 30 capsule, Rfl: 0    pantoprazole (PROTONIX) 40 mg tablet, , Disp: , Rfl:     QUEtiapine (SEROquel) 25 mg tablet, Take 1 tablet (25 mg total) by mouth daily at bedtime (Patient not  taking: Reported on 10/8/2024), Disp: 30 tablet, Rfl: 1    venlafaxine (EFFEXOR-XR) 37.5 mg 24 hr capsule, , Disp: , Rfl:     Current Allergies     Allergies as of 2025 - Reviewed 2025   Allergen Reaction Noted    Metronidazole GI Intolerance 2018    Azithromycin Palpitations 2017    Tramadol Palpitations 2016            The following portions of the patient's history were reviewed and updated as appropriate: allergies, current medications, past family history, past medical history, past social history, past surgical history and problem list.     Past Medical History:   Diagnosis Date    Anxiety     Depression        Past Surgical History:   Procedure Laterality Date     SECTION      DILATION AND CURETTAGE, DIAGNOSTIC / THERAPEUTIC         No family history on file.      Medications have been verified.        Objective   /66   Pulse 82   Temp 98.1 °F (36.7 °C)   Resp 18   SpO2 98%   No LMP recorded.       Physical Exam     Physical Exam  Constitutional:       General: She is not in acute distress.     Appearance: Normal appearance. She is not toxic-appearing.   HENT:      Head: Normocephalic and atraumatic.      Right Ear: Tympanic membrane, ear canal and external ear normal.      Left Ear: Tympanic membrane, ear canal and external ear normal.      Nose: Congestion present.   Cardiovascular:      Rate and Rhythm: Normal rate.   Pulmonary:      Effort: Pulmonary effort is normal.      Breath sounds: Normal breath sounds. No wheezing.   Neurological:      General: No focal deficit present.      Mental Status: She is alert.   Psychiatric:         Mood and Affect: Mood normal.

## 2025-08-18 ENCOUNTER — OFFICE VISIT (OUTPATIENT)
Dept: URGENT CARE | Facility: CLINIC | Age: 40
End: 2025-08-18
Payer: COMMERCIAL

## 2025-08-18 VITALS
DIASTOLIC BLOOD PRESSURE: 80 MMHG | HEART RATE: 90 BPM | SYSTOLIC BLOOD PRESSURE: 130 MMHG | BODY MASS INDEX: 25.86 KG/M2 | TEMPERATURE: 97.9 F | WEIGHT: 146 LBS | OXYGEN SATURATION: 99 %

## 2025-08-18 DIAGNOSIS — R39.9 UTI SYMPTOMS: Primary | ICD-10-CM

## 2025-08-18 LAB
SL AMB  POCT GLUCOSE, UA: ABNORMAL
SL AMB LEUKOCYTE ESTERASE,UA: ABNORMAL
SL AMB POCT BILIRUBIN,UA: ABNORMAL
SL AMB POCT BLOOD,UA: ABNORMAL
SL AMB POCT CLARITY,UA: CLEAR
SL AMB POCT COLOR,UA: YELLOW
SL AMB POCT KETONES,UA: ABNORMAL
SL AMB POCT NITRITE,UA: ABNORMAL
SL AMB POCT PH,UA: 7.5
SL AMB POCT SPECIFIC GRAVITY,UA: 1.01
SL AMB POCT URINE PROTEIN: ABNORMAL
SL AMB POCT UROBILINOGEN: 0.2

## 2025-08-18 PROCEDURE — 87147 CULTURE TYPE IMMUNOLOGIC: CPT

## 2025-08-18 PROCEDURE — 87086 URINE CULTURE/COLONY COUNT: CPT

## 2025-08-18 PROCEDURE — 81002 URINALYSIS NONAUTO W/O SCOPE: CPT

## 2025-08-18 PROCEDURE — 99212 OFFICE O/P EST SF 10 MIN: CPT

## 2025-08-19 ENCOUNTER — RESULTS FOLLOW-UP (OUTPATIENT)
Dept: URGENT CARE | Facility: CLINIC | Age: 40
End: 2025-08-19

## 2025-08-19 DIAGNOSIS — R39.9 UTI SYMPTOMS: Primary | ICD-10-CM

## 2025-08-19 LAB
BACTERIA UR CULT: ABNORMAL
BACTERIA UR CULT: ABNORMAL

## 2025-08-19 RX ORDER — CEPHALEXIN 500 MG/1
500 CAPSULE ORAL EVERY 12 HOURS SCHEDULED
Qty: 14 CAPSULE | Refills: 0 | Status: SHIPPED | OUTPATIENT
Start: 2025-08-19 | End: 2025-08-26